# Patient Record
Sex: MALE | Race: WHITE | NOT HISPANIC OR LATINO | Employment: OTHER
[De-identification: names, ages, dates, MRNs, and addresses within clinical notes are randomized per-mention and may not be internally consistent; named-entity substitution may affect disease eponyms.]

---

## 2017-01-10 ENCOUNTER — RECORDS - HEALTHEAST (OUTPATIENT)
Dept: ADMINISTRATIVE | Facility: OTHER | Age: 57
End: 2017-01-10

## 2017-01-19 ENCOUNTER — AMBULATORY - HEALTHEAST (OUTPATIENT)
Dept: PODIATRY | Facility: CLINIC | Age: 57
End: 2017-01-19

## 2017-01-19 DIAGNOSIS — B35.1 NAIL FUNGUS: ICD-10-CM

## 2017-01-19 DIAGNOSIS — L60.2 ONYCHAUXIS: ICD-10-CM

## 2017-01-27 ENCOUNTER — OFFICE VISIT - HEALTHEAST (OUTPATIENT)
Dept: FAMILY MEDICINE | Facility: CLINIC | Age: 57
End: 2017-01-27

## 2017-01-27 DIAGNOSIS — L21.9 SEBORRHEIC DERMATITIS OF SCALP: ICD-10-CM

## 2017-01-27 DIAGNOSIS — H61.20 CERUMEN IMPACTION: ICD-10-CM

## 2017-01-27 DIAGNOSIS — L30.9 ECZEMA: ICD-10-CM

## 2017-01-27 ASSESSMENT — MIFFLIN-ST. JEOR: SCORE: 2046.43

## 2017-02-15 ENCOUNTER — COMMUNICATION - HEALTHEAST (OUTPATIENT)
Dept: FAMILY MEDICINE | Facility: CLINIC | Age: 57
End: 2017-02-15

## 2017-02-21 ENCOUNTER — COMMUNICATION - HEALTHEAST (OUTPATIENT)
Dept: FAMILY MEDICINE | Facility: CLINIC | Age: 57
End: 2017-02-21

## 2017-02-28 ENCOUNTER — OFFICE VISIT - HEALTHEAST (OUTPATIENT)
Dept: FAMILY MEDICINE | Facility: CLINIC | Age: 57
End: 2017-02-28

## 2017-02-28 DIAGNOSIS — L21.9 SEBORRHEIC DERMATITIS: ICD-10-CM

## 2017-02-28 DIAGNOSIS — H61.23 BILATERAL IMPACTED CERUMEN: ICD-10-CM

## 2017-02-28 DIAGNOSIS — M25.561 RIGHT KNEE PAIN: ICD-10-CM

## 2017-02-28 DIAGNOSIS — E53.8 OTHER B-COMPLEX DEFICIENCIES: ICD-10-CM

## 2017-02-28 DIAGNOSIS — Q90.9 DOWN'S SYNDROME: ICD-10-CM

## 2017-03-22 ENCOUNTER — AMBULATORY - HEALTHEAST (OUTPATIENT)
Dept: NURSING | Facility: CLINIC | Age: 57
End: 2017-03-22

## 2017-03-30 ENCOUNTER — AMBULATORY - HEALTHEAST (OUTPATIENT)
Dept: PODIATRY | Facility: CLINIC | Age: 57
End: 2017-03-30

## 2017-03-30 DIAGNOSIS — L60.2 ONYCHAUXIS: ICD-10-CM

## 2017-03-30 DIAGNOSIS — B35.1 NAIL FUNGUS: ICD-10-CM

## 2017-04-26 ENCOUNTER — OFFICE VISIT - HEALTHEAST (OUTPATIENT)
Dept: FAMILY MEDICINE | Facility: CLINIC | Age: 57
End: 2017-04-26

## 2017-04-26 DIAGNOSIS — E53.8 OTHER B-COMPLEX DEFICIENCIES: ICD-10-CM

## 2017-04-26 DIAGNOSIS — H61.23 BILATERAL IMPACTED CERUMEN: ICD-10-CM

## 2017-04-26 DIAGNOSIS — Q90.9 DOWN'S SYNDROME: ICD-10-CM

## 2017-04-26 ASSESSMENT — MIFFLIN-ST. JEOR: SCORE: 2024.03

## 2017-05-10 ENCOUNTER — COMMUNICATION - HEALTHEAST (OUTPATIENT)
Dept: SCHEDULING | Facility: CLINIC | Age: 57
End: 2017-05-10

## 2017-05-10 DIAGNOSIS — H91.93 IMPAIRED HEARING, BILATERAL: ICD-10-CM

## 2017-05-30 ENCOUNTER — RECORDS - HEALTHEAST (OUTPATIENT)
Dept: ADMINISTRATIVE | Facility: OTHER | Age: 57
End: 2017-05-30

## 2017-05-30 ENCOUNTER — OFFICE VISIT - HEALTHEAST (OUTPATIENT)
Dept: FAMILY MEDICINE | Facility: CLINIC | Age: 57
End: 2017-05-30

## 2017-05-30 DIAGNOSIS — E53.8 OTHER B-COMPLEX DEFICIENCIES: ICD-10-CM

## 2017-05-30 DIAGNOSIS — M25.561 RIGHT KNEE PAIN: ICD-10-CM

## 2017-05-30 DIAGNOSIS — L21.9 SEBORRHEIC DERMATITIS: ICD-10-CM

## 2017-05-30 DIAGNOSIS — H61.23 BILATERAL IMPACTED CERUMEN: ICD-10-CM

## 2017-05-30 DIAGNOSIS — Q90.9 DOWN'S SYNDROME: ICD-10-CM

## 2017-05-30 ASSESSMENT — MIFFLIN-ST. JEOR: SCORE: 2011.96

## 2017-06-07 ENCOUNTER — COMMUNICATION - HEALTHEAST (OUTPATIENT)
Dept: FAMILY MEDICINE | Facility: CLINIC | Age: 57
End: 2017-06-07

## 2017-06-07 DIAGNOSIS — M25.561 RIGHT KNEE PAIN: ICD-10-CM

## 2017-06-12 ENCOUNTER — RECORDS - HEALTHEAST (OUTPATIENT)
Dept: ADMINISTRATIVE | Facility: OTHER | Age: 57
End: 2017-06-12

## 2017-06-15 ENCOUNTER — RECORDS - HEALTHEAST (OUTPATIENT)
Dept: ADMINISTRATIVE | Facility: OTHER | Age: 57
End: 2017-06-15

## 2017-06-22 ENCOUNTER — AMBULATORY - HEALTHEAST (OUTPATIENT)
Dept: PODIATRY | Facility: CLINIC | Age: 57
End: 2017-06-22

## 2017-06-22 DIAGNOSIS — L60.2 ONYCHAUXIS: ICD-10-CM

## 2017-06-22 DIAGNOSIS — B35.1 NAIL FUNGUS: ICD-10-CM

## 2017-06-26 ENCOUNTER — COMMUNICATION - HEALTHEAST (OUTPATIENT)
Dept: FAMILY MEDICINE | Facility: CLINIC | Age: 57
End: 2017-06-26

## 2017-06-27 ENCOUNTER — AMBULATORY - HEALTHEAST (OUTPATIENT)
Dept: NURSING | Facility: CLINIC | Age: 57
End: 2017-06-27

## 2017-06-27 ENCOUNTER — RECORDS - HEALTHEAST (OUTPATIENT)
Dept: ADMINISTRATIVE | Facility: OTHER | Age: 57
End: 2017-06-27

## 2017-07-26 ENCOUNTER — OFFICE VISIT - HEALTHEAST (OUTPATIENT)
Dept: FAMILY MEDICINE | Facility: CLINIC | Age: 57
End: 2017-07-26

## 2017-07-26 DIAGNOSIS — E53.8 OTHER B-COMPLEX DEFICIENCIES: ICD-10-CM

## 2017-07-26 DIAGNOSIS — Q90.9 DOWN'S SYNDROME: ICD-10-CM

## 2017-07-26 DIAGNOSIS — M17.0 OSTEOARTHRITIS OF KNEES, BILATERAL: ICD-10-CM

## 2017-07-26 DIAGNOSIS — H61.23 BILATERAL IMPACTED CERUMEN: ICD-10-CM

## 2017-07-26 ASSESSMENT — MIFFLIN-ST. JEOR: SCORE: 2006.46

## 2017-08-15 ENCOUNTER — RECORDS - HEALTHEAST (OUTPATIENT)
Dept: ADMINISTRATIVE | Facility: OTHER | Age: 57
End: 2017-08-15

## 2017-08-30 ENCOUNTER — OFFICE VISIT - HEALTHEAST (OUTPATIENT)
Dept: FAMILY MEDICINE | Facility: CLINIC | Age: 57
End: 2017-08-30

## 2017-08-30 DIAGNOSIS — Q90.9 DOWN'S SYNDROME: ICD-10-CM

## 2017-08-30 DIAGNOSIS — Z23 NEED FOR VACCINATION: ICD-10-CM

## 2017-08-30 DIAGNOSIS — E53.8 OTHER B-COMPLEX DEFICIENCIES: ICD-10-CM

## 2017-08-30 DIAGNOSIS — H61.23 BILATERAL IMPACTED CERUMEN: ICD-10-CM

## 2017-08-30 DIAGNOSIS — B35.1 ONYCHOMYCOSIS: ICD-10-CM

## 2017-08-30 DIAGNOSIS — M17.0 OSTEOARTHRITIS OF KNEES, BILATERAL: ICD-10-CM

## 2017-08-30 ASSESSMENT — MIFFLIN-ST. JEOR: SCORE: 2011.56

## 2017-08-31 ENCOUNTER — AMBULATORY - HEALTHEAST (OUTPATIENT)
Dept: PODIATRY | Facility: CLINIC | Age: 57
End: 2017-08-31

## 2017-08-31 DIAGNOSIS — L60.2 ONYCHAUXIS: ICD-10-CM

## 2017-08-31 DIAGNOSIS — B35.1 NAIL FUNGUS: ICD-10-CM

## 2017-09-05 ENCOUNTER — COMMUNICATION - HEALTHEAST (OUTPATIENT)
Dept: FAMILY MEDICINE | Facility: CLINIC | Age: 57
End: 2017-09-05

## 2017-09-20 ENCOUNTER — RECORDS - HEALTHEAST (OUTPATIENT)
Dept: ADMINISTRATIVE | Facility: OTHER | Age: 57
End: 2017-09-20

## 2017-09-27 ENCOUNTER — OFFICE VISIT - HEALTHEAST (OUTPATIENT)
Dept: FAMILY MEDICINE | Facility: CLINIC | Age: 57
End: 2017-09-27

## 2017-09-27 ENCOUNTER — RECORDS - HEALTHEAST (OUTPATIENT)
Dept: ADMINISTRATIVE | Facility: OTHER | Age: 57
End: 2017-09-27

## 2017-09-27 DIAGNOSIS — H61.23 BILATERAL IMPACTED CERUMEN: ICD-10-CM

## 2017-09-27 DIAGNOSIS — M17.0 OSTEOARTHRITIS OF KNEES, BILATERAL: ICD-10-CM

## 2017-09-27 DIAGNOSIS — Q90.9 DOWN'S SYNDROME: ICD-10-CM

## 2017-09-27 DIAGNOSIS — E53.8 OTHER B-COMPLEX DEFICIENCIES: ICD-10-CM

## 2017-09-27 ASSESSMENT — MIFFLIN-ST. JEOR: SCORE: 2012.13

## 2017-10-25 ENCOUNTER — OFFICE VISIT - HEALTHEAST (OUTPATIENT)
Dept: FAMILY MEDICINE | Facility: CLINIC | Age: 57
End: 2017-10-25

## 2017-10-25 DIAGNOSIS — E53.8 VITAMIN B12 DEFICIENCY: ICD-10-CM

## 2017-10-25 DIAGNOSIS — Q90.9 DOWN'S SYNDROME: ICD-10-CM

## 2017-10-25 DIAGNOSIS — H61.23 BILATERAL IMPACTED CERUMEN: ICD-10-CM

## 2017-10-25 DIAGNOSIS — L85.3 XEROSIS CUTIS: ICD-10-CM

## 2017-10-25 ASSESSMENT — MIFFLIN-ST. JEOR: SCORE: 1997.38

## 2017-11-29 ENCOUNTER — OFFICE VISIT - HEALTHEAST (OUTPATIENT)
Dept: FAMILY MEDICINE | Facility: CLINIC | Age: 57
End: 2017-11-29

## 2017-11-29 DIAGNOSIS — Q90.9 DOWN'S SYNDROME: ICD-10-CM

## 2017-11-29 DIAGNOSIS — M17.0 OSTEOARTHRITIS OF KNEES, BILATERAL: ICD-10-CM

## 2017-11-29 DIAGNOSIS — H61.23 BILATERAL IMPACTED CERUMEN: ICD-10-CM

## 2017-11-29 DIAGNOSIS — E53.8 VITAMIN B12 DEFICIENCY: ICD-10-CM

## 2017-11-29 ASSESSMENT — MIFFLIN-ST. JEOR: SCORE: 1996.53

## 2017-11-30 ENCOUNTER — AMBULATORY - HEALTHEAST (OUTPATIENT)
Dept: PODIATRY | Facility: CLINIC | Age: 57
End: 2017-11-30

## 2017-11-30 DIAGNOSIS — B35.1 NAIL FUNGUS: ICD-10-CM

## 2017-11-30 DIAGNOSIS — L60.2 ONYCHAUXIS: ICD-10-CM

## 2017-12-08 ENCOUNTER — COMMUNICATION - HEALTHEAST (OUTPATIENT)
Dept: ADMINISTRATIVE | Facility: CLINIC | Age: 57
End: 2017-12-08

## 2017-12-11 ENCOUNTER — OFFICE VISIT - HEALTHEAST (OUTPATIENT)
Dept: FAMILY MEDICINE | Facility: CLINIC | Age: 57
End: 2017-12-11

## 2017-12-11 DIAGNOSIS — E66.9 OBESITY: ICD-10-CM

## 2017-12-11 DIAGNOSIS — Q90.9 DOWN'S SYNDROME: ICD-10-CM

## 2017-12-11 DIAGNOSIS — E53.8 VITAMIN B12 DEFICIENCY: ICD-10-CM

## 2017-12-11 DIAGNOSIS — B35.1 ONYCHOMYCOSIS: ICD-10-CM

## 2017-12-11 DIAGNOSIS — Z12.5 PROSTATE CANCER SCREENING: ICD-10-CM

## 2017-12-11 DIAGNOSIS — E03.9 HYPOTHYROIDISM: ICD-10-CM

## 2017-12-11 DIAGNOSIS — Z11.1 SCREENING FOR TUBERCULOSIS: ICD-10-CM

## 2017-12-11 DIAGNOSIS — Z00.01 ENCOUNTER FOR GENERAL ADULT MEDICAL EXAMINATION WITH ABNORMAL FINDINGS: ICD-10-CM

## 2017-12-11 DIAGNOSIS — M17.0 OSTEOARTHRITIS OF KNEES, BILATERAL: ICD-10-CM

## 2017-12-11 DIAGNOSIS — L21.9 SEBORRHEIC DERMATITIS: ICD-10-CM

## 2017-12-11 DIAGNOSIS — Z12.11 SCREEN FOR COLON CANCER: ICD-10-CM

## 2017-12-11 DIAGNOSIS — H61.23 BILATERAL IMPACTED CERUMEN: ICD-10-CM

## 2017-12-11 DIAGNOSIS — H91.93 HEARING DIFFICULTY OF BOTH EARS: ICD-10-CM

## 2017-12-11 ASSESSMENT — MIFFLIN-ST. JEOR: SCORE: 1977.26

## 2017-12-12 LAB
CHOLEST SERPL-MCNC: 162 MG/DL
FASTING STATUS PATIENT QL REPORTED: NO
HDLC SERPL-MCNC: 60 MG/DL
LDLC SERPL CALC-MCNC: 91 MG/DL
PSA SERPL-MCNC: 0.1 NG/ML (ref 0–3.5)
TRIGL SERPL-MCNC: 55 MG/DL

## 2017-12-13 ENCOUNTER — COMMUNICATION - HEALTHEAST (OUTPATIENT)
Dept: FAMILY MEDICINE | Facility: CLINIC | Age: 57
End: 2017-12-13

## 2017-12-20 ENCOUNTER — AMBULATORY - HEALTHEAST (OUTPATIENT)
Dept: NURSING | Facility: CLINIC | Age: 57
End: 2017-12-20

## 2018-01-09 ENCOUNTER — OFFICE VISIT - HEALTHEAST (OUTPATIENT)
Dept: FAMILY MEDICINE | Facility: CLINIC | Age: 58
End: 2018-01-09

## 2018-01-09 ENCOUNTER — RECORDS - HEALTHEAST (OUTPATIENT)
Dept: GENERAL RADIOLOGY | Facility: CLINIC | Age: 58
End: 2018-01-09
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
## 2018-01-09 DIAGNOSIS — S83.92XA SPRAIN OF LEFT KNEE: ICD-10-CM

## 2018-01-09 DIAGNOSIS — S00.01XA ABRASION, SCALP W/O INFECTION: ICD-10-CM

## 2018-01-24 ENCOUNTER — OFFICE VISIT - HEALTHEAST (OUTPATIENT)
Dept: FAMILY MEDICINE | Facility: CLINIC | Age: 58
End: 2018-01-24

## 2018-01-24 DIAGNOSIS — H61.23 BILATERAL IMPACTED CERUMEN: ICD-10-CM

## 2018-01-24 DIAGNOSIS — E53.8 VITAMIN B12 DEFICIENCY: ICD-10-CM

## 2018-01-24 DIAGNOSIS — S00.01XA ABRASION OF SCALP: ICD-10-CM

## 2018-01-24 DIAGNOSIS — Q90.9 DOWN'S SYNDROME: ICD-10-CM

## 2018-01-24 DIAGNOSIS — Z12.11 SCREEN FOR COLON CANCER: ICD-10-CM

## 2018-01-24 ASSESSMENT — MIFFLIN-ST. JEOR: SCORE: 1967.62

## 2018-02-24 ENCOUNTER — COMMUNICATION - HEALTHEAST (OUTPATIENT)
Dept: FAMILY MEDICINE | Facility: CLINIC | Age: 58
End: 2018-02-24

## 2018-02-24 DIAGNOSIS — E03.9 HYPOTHYROIDISM: ICD-10-CM

## 2018-02-28 ENCOUNTER — HOSPITAL ENCOUNTER (OUTPATIENT)
Dept: CT IMAGING | Facility: HOSPITAL | Age: 58
Discharge: HOME | End: 2018-02-28
Attending: FAMILY MEDICINE

## 2018-02-28 ENCOUNTER — OFFICE VISIT - HEALTHEAST (OUTPATIENT)
Dept: FAMILY MEDICINE | Facility: CLINIC | Age: 58
End: 2018-02-28

## 2018-02-28 DIAGNOSIS — S09.90XA HEAD INJURY: ICD-10-CM

## 2018-02-28 DIAGNOSIS — H61.23 BILATERAL IMPACTED CERUMEN: ICD-10-CM

## 2018-02-28 DIAGNOSIS — E53.8 VITAMIN B12 DEFICIENCY: ICD-10-CM

## 2018-02-28 DIAGNOSIS — R51.9 HEADACHE: ICD-10-CM

## 2018-02-28 ASSESSMENT — MIFFLIN-ST. JEOR: SCORE: 1973.29

## 2018-03-01 ENCOUNTER — AMBULATORY - HEALTHEAST (OUTPATIENT)
Dept: PODIATRY | Facility: CLINIC | Age: 58
End: 2018-03-01

## 2018-03-01 DIAGNOSIS — B35.1 NAIL FUNGUS: ICD-10-CM

## 2018-03-01 DIAGNOSIS — L60.2 ONYCHAUXIS: ICD-10-CM

## 2018-03-01 DIAGNOSIS — L84 TYLOMA: ICD-10-CM

## 2018-03-05 ENCOUNTER — COMMUNICATION - HEALTHEAST (OUTPATIENT)
Dept: FAMILY MEDICINE | Facility: CLINIC | Age: 58
End: 2018-03-05

## 2018-03-07 ENCOUNTER — OFFICE VISIT - HEALTHEAST (OUTPATIENT)
Dept: FAMILY MEDICINE | Facility: CLINIC | Age: 58
End: 2018-03-07
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
## 2018-03-07 DIAGNOSIS — L85.3 DRY SKIN: ICD-10-CM

## 2018-03-07 DIAGNOSIS — L98.9 SKIN LESION OF BACK: ICD-10-CM

## 2018-03-07 ASSESSMENT — MIFFLIN-ST. JEOR: SCORE: 1983.22

## 2018-03-28 ENCOUNTER — OFFICE VISIT - HEALTHEAST (OUTPATIENT)
Dept: FAMILY MEDICINE | Facility: CLINIC | Age: 58
End: 2018-03-28

## 2018-03-28 DIAGNOSIS — E53.8 VITAMIN B12 DEFICIENCY: ICD-10-CM

## 2018-03-28 DIAGNOSIS — H61.23 BILATERAL IMPACTED CERUMEN: ICD-10-CM

## 2018-03-28 DIAGNOSIS — Z12.11 SCREEN FOR COLON CANCER: ICD-10-CM

## 2018-03-28 ASSESSMENT — MIFFLIN-ST. JEOR: SCORE: 1981.23

## 2018-04-04 ENCOUNTER — RECORDS - HEALTHEAST (OUTPATIENT)
Dept: ADMINISTRATIVE | Facility: OTHER | Age: 58
End: 2018-04-04

## 2018-04-25 ENCOUNTER — OFFICE VISIT - HEALTHEAST (OUTPATIENT)
Dept: FAMILY MEDICINE | Facility: CLINIC | Age: 58
End: 2018-04-25

## 2018-04-25 DIAGNOSIS — H61.23 BILATERAL IMPACTED CERUMEN: ICD-10-CM

## 2018-04-25 DIAGNOSIS — E53.8 VITAMIN B12 DEFICIENCY: ICD-10-CM

## 2018-04-25 ASSESSMENT — MIFFLIN-ST. JEOR: SCORE: 1969.61

## 2018-05-04 ENCOUNTER — COMMUNICATION - HEALTHEAST (OUTPATIENT)
Dept: ADMINISTRATIVE | Facility: CLINIC | Age: 58
End: 2018-05-04

## 2018-05-04 ENCOUNTER — AMBULATORY - HEALTHEAST (OUTPATIENT)
Dept: LAB | Facility: CLINIC | Age: 58
End: 2018-05-04

## 2018-05-04 DIAGNOSIS — Z12.11 COLON CANCER SCREENING: ICD-10-CM

## 2018-05-04 LAB
HEMOCCULT SP1 STL QL: NEGATIVE
HEMOCCULT SP2 STL QL: NEGATIVE
HEMOCCULT SP3 STL QL: NEGATIVE

## 2018-05-09 ENCOUNTER — COMMUNICATION - HEALTHEAST (OUTPATIENT)
Dept: FAMILY MEDICINE | Facility: CLINIC | Age: 58
End: 2018-05-09

## 2018-05-23 ENCOUNTER — OFFICE VISIT - HEALTHEAST (OUTPATIENT)
Dept: FAMILY MEDICINE | Facility: CLINIC | Age: 58
End: 2018-05-23

## 2018-05-23 DIAGNOSIS — H61.23 BILATERAL IMPACTED CERUMEN: ICD-10-CM

## 2018-05-23 DIAGNOSIS — Q90.9 DOWN'S SYNDROME: ICD-10-CM

## 2018-05-23 DIAGNOSIS — E53.8 VITAMIN B12 DEFICIENCY: ICD-10-CM

## 2018-05-23 ASSESSMENT — MIFFLIN-ST. JEOR: SCORE: 1976.69

## 2018-06-11 ENCOUNTER — OFFICE VISIT - HEALTHEAST (OUTPATIENT)
Dept: PODIATRY | Facility: CLINIC | Age: 58
End: 2018-06-11

## 2018-06-11 DIAGNOSIS — L60.2 ONYCHAUXIS: ICD-10-CM

## 2018-06-11 DIAGNOSIS — B35.1 NAIL FUNGUS: ICD-10-CM

## 2018-06-11 DIAGNOSIS — L84 TYLOMA: ICD-10-CM

## 2018-06-27 ENCOUNTER — OFFICE VISIT - HEALTHEAST (OUTPATIENT)
Dept: FAMILY MEDICINE | Facility: CLINIC | Age: 58
End: 2018-06-27

## 2018-06-27 DIAGNOSIS — H61.23 BILATERAL IMPACTED CERUMEN: ICD-10-CM

## 2018-06-27 DIAGNOSIS — M25.532 LEFT WRIST PAIN: ICD-10-CM

## 2018-06-27 DIAGNOSIS — E53.8 VITAMIN B12 DEFICIENCY: ICD-10-CM

## 2018-06-27 DIAGNOSIS — Q90.9 DOWN'S SYNDROME: ICD-10-CM

## 2018-06-27 DIAGNOSIS — H18.9 CORNEAL DISORDER: ICD-10-CM

## 2018-06-27 ASSESSMENT — MIFFLIN-ST. JEOR: SCORE: 1944.38

## 2018-07-17 ENCOUNTER — RECORDS - HEALTHEAST (OUTPATIENT)
Dept: ADMINISTRATIVE | Facility: OTHER | Age: 58
End: 2018-07-17

## 2018-07-24 ENCOUNTER — OFFICE VISIT - HEALTHEAST (OUTPATIENT)
Dept: FAMILY MEDICINE | Facility: CLINIC | Age: 58
End: 2018-07-24

## 2018-07-24 DIAGNOSIS — H91.93 HEARING DIFFICULTY OF BOTH EARS: ICD-10-CM

## 2018-07-24 DIAGNOSIS — Q90.9 DOWN'S SYNDROME: ICD-10-CM

## 2018-07-24 DIAGNOSIS — H61.23 BILATERAL IMPACTED CERUMEN: ICD-10-CM

## 2018-07-24 DIAGNOSIS — E53.8 VITAMIN B12 DEFICIENCY: ICD-10-CM

## 2018-07-24 ASSESSMENT — MIFFLIN-ST. JEOR: SCORE: 1955.15

## 2018-08-29 ENCOUNTER — OFFICE VISIT - HEALTHEAST (OUTPATIENT)
Dept: FAMILY MEDICINE | Facility: CLINIC | Age: 58
End: 2018-08-29

## 2018-08-29 DIAGNOSIS — Q90.9 DOWN'S SYNDROME: ICD-10-CM

## 2018-08-29 DIAGNOSIS — H91.93 HEARING DIFFICULTY OF BOTH EARS: ICD-10-CM

## 2018-08-29 DIAGNOSIS — E53.8 VITAMIN B12 DEFICIENCY: ICD-10-CM

## 2018-08-29 DIAGNOSIS — H61.23 BILATERAL IMPACTED CERUMEN: ICD-10-CM

## 2018-08-29 ASSESSMENT — MIFFLIN-ST. JEOR: SCORE: 1969.04

## 2018-09-12 ENCOUNTER — COMMUNICATION - HEALTHEAST (OUTPATIENT)
Dept: FAMILY MEDICINE | Facility: CLINIC | Age: 58
End: 2018-09-12

## 2018-09-26 ENCOUNTER — OFFICE VISIT - HEALTHEAST (OUTPATIENT)
Dept: FAMILY MEDICINE | Facility: CLINIC | Age: 58
End: 2018-09-26

## 2018-09-26 DIAGNOSIS — E53.8 VITAMIN B12 DEFICIENCY: ICD-10-CM

## 2018-09-26 DIAGNOSIS — Q90.9 DOWN'S SYNDROME: ICD-10-CM

## 2018-09-26 DIAGNOSIS — H61.23 BILATERAL IMPACTED CERUMEN: ICD-10-CM

## 2018-09-26 DIAGNOSIS — H91.93 HEARING DIFFICULTY OF BOTH EARS: ICD-10-CM

## 2018-09-26 ASSESSMENT — MIFFLIN-ST. JEOR: SCORE: 1953.45

## 2018-10-04 ENCOUNTER — OFFICE VISIT - HEALTHEAST (OUTPATIENT)
Dept: PODIATRY | Facility: CLINIC | Age: 58
End: 2018-10-04

## 2018-10-04 DIAGNOSIS — L60.2 ONYCHAUXIS: ICD-10-CM

## 2018-10-04 DIAGNOSIS — B35.1 NAIL FUNGUS: ICD-10-CM

## 2018-10-04 DIAGNOSIS — L84 TYLOMA: ICD-10-CM

## 2018-10-04 ASSESSMENT — MIFFLIN-ST. JEOR: SCORE: 1953.45

## 2018-10-10 ENCOUNTER — RECORDS - HEALTHEAST (OUTPATIENT)
Dept: ADMINISTRATIVE | Facility: OTHER | Age: 58
End: 2018-10-10

## 2018-10-16 ENCOUNTER — RECORDS - HEALTHEAST (OUTPATIENT)
Dept: ADMINISTRATIVE | Facility: OTHER | Age: 58
End: 2018-10-16

## 2018-10-24 ENCOUNTER — OFFICE VISIT - HEALTHEAST (OUTPATIENT)
Dept: FAMILY MEDICINE | Facility: CLINIC | Age: 58
End: 2018-10-24

## 2018-10-24 DIAGNOSIS — E53.8 VITAMIN B12 DEFICIENCY: ICD-10-CM

## 2018-10-24 DIAGNOSIS — Q90.9 DOWN'S SYNDROME: ICD-10-CM

## 2018-10-24 DIAGNOSIS — H61.23 BILATERAL IMPACTED CERUMEN: ICD-10-CM

## 2018-10-24 ASSESSMENT — MIFFLIN-ST. JEOR: SCORE: 1944.66

## 2018-11-12 ENCOUNTER — RECORDS - HEALTHEAST (OUTPATIENT)
Dept: ADMINISTRATIVE | Facility: OTHER | Age: 58
End: 2018-11-12

## 2018-11-20 ENCOUNTER — COMMUNICATION - HEALTHEAST (OUTPATIENT)
Dept: FAMILY MEDICINE | Facility: CLINIC | Age: 58
End: 2018-11-20

## 2018-11-20 DIAGNOSIS — E03.9 HYPOTHYROIDISM: ICD-10-CM

## 2018-11-21 ENCOUNTER — COMMUNICATION - HEALTHEAST (OUTPATIENT)
Dept: FAMILY MEDICINE | Facility: CLINIC | Age: 58
End: 2018-11-21

## 2018-11-26 ENCOUNTER — OFFICE VISIT - HEALTHEAST (OUTPATIENT)
Dept: FAMILY MEDICINE | Facility: CLINIC | Age: 58
End: 2018-11-26

## 2018-11-26 DIAGNOSIS — Q90.9 DOWN'S SYNDROME: ICD-10-CM

## 2018-11-26 DIAGNOSIS — H61.23 BILATERAL IMPACTED CERUMEN: ICD-10-CM

## 2018-11-26 DIAGNOSIS — E53.8 VITAMIN B12 DEFICIENCY: ICD-10-CM

## 2018-11-26 DIAGNOSIS — H91.93 HEARING DIFFICULTY OF BOTH EARS: ICD-10-CM

## 2018-11-26 ASSESSMENT — MIFFLIN-ST. JEOR: SCORE: 1948.91

## 2018-12-19 ENCOUNTER — OFFICE VISIT - HEALTHEAST (OUTPATIENT)
Dept: FAMILY MEDICINE | Facility: CLINIC | Age: 58
End: 2018-12-19

## 2018-12-19 DIAGNOSIS — G47.33 OBSTRUCTIVE SLEEP APNEA SYNDROME: ICD-10-CM

## 2018-12-19 DIAGNOSIS — Q90.9 DOWN'S SYNDROME: ICD-10-CM

## 2018-12-19 DIAGNOSIS — E66.01 CLASS 3 SEVERE OBESITY DUE TO EXCESS CALORIES WITH BODY MASS INDEX (BMI) OF 40.0 TO 44.9 IN ADULT, UNSPECIFIED WHETHER SERIOUS COMORBIDITY PRESENT (H): ICD-10-CM

## 2018-12-19 DIAGNOSIS — R73.03 PREDIABETES: ICD-10-CM

## 2018-12-19 DIAGNOSIS — E53.8 VITAMIN B12 DEFICIENCY: ICD-10-CM

## 2018-12-19 DIAGNOSIS — F71 MODERATE INTELLECTUAL DISABILITIES: ICD-10-CM

## 2018-12-19 DIAGNOSIS — E03.9 HYPOTHYROIDISM, UNSPECIFIED TYPE: ICD-10-CM

## 2018-12-19 DIAGNOSIS — H91.93 HEARING DIFFICULTY OF BOTH EARS: ICD-10-CM

## 2018-12-19 DIAGNOSIS — L85.3 DRY SKIN: ICD-10-CM

## 2018-12-19 DIAGNOSIS — M10.9 GOUTY ARTHROPATHY: ICD-10-CM

## 2018-12-19 DIAGNOSIS — H61.23 BILATERAL IMPACTED CERUMEN: ICD-10-CM

## 2018-12-19 DIAGNOSIS — Z00.01 ENCOUNTER FOR GENERAL ADULT MEDICAL EXAMINATION WITH ABNORMAL FINDINGS: ICD-10-CM

## 2018-12-19 DIAGNOSIS — Z12.5 SCREENING FOR PROSTATE CANCER: ICD-10-CM

## 2018-12-19 DIAGNOSIS — E66.813 CLASS 3 SEVERE OBESITY DUE TO EXCESS CALORIES WITH BODY MASS INDEX (BMI) OF 40.0 TO 44.9 IN ADULT, UNSPECIFIED WHETHER SERIOUS COMORBIDITY PRESENT (H): ICD-10-CM

## 2018-12-19 DIAGNOSIS — H18.9 CORNEAL DISORDER: ICD-10-CM

## 2018-12-19 LAB
ALBUMIN SERPL-MCNC: 3.5 G/DL (ref 3.5–5)
ALP SERPL-CCNC: 91 U/L (ref 45–120)
ALT SERPL W P-5'-P-CCNC: 21 U/L (ref 0–45)
ANION GAP SERPL CALCULATED.3IONS-SCNC: 11 MMOL/L (ref 5–18)
AST SERPL W P-5'-P-CCNC: 25 U/L (ref 0–40)
BILIRUB SERPL-MCNC: 0.6 MG/DL (ref 0–1)
BUN SERPL-MCNC: 15 MG/DL (ref 8–22)
CALCIUM SERPL-MCNC: 9.3 MG/DL (ref 8.5–10.5)
CHLORIDE BLD-SCNC: 102 MMOL/L (ref 98–107)
CO2 SERPL-SCNC: 27 MMOL/L (ref 22–31)
CREAT SERPL-MCNC: 0.8 MG/DL (ref 0.7–1.3)
ERYTHROCYTE [DISTWIDTH] IN BLOOD BY AUTOMATED COUNT: 11.3 % (ref 11–14.5)
GFR SERPL CREATININE-BSD FRML MDRD: >60 ML/MIN/1.73M2
GLUCOSE BLD-MCNC: 98 MG/DL (ref 70–125)
HBA1C MFR BLD: 5.6 % (ref 3.5–6)
HCT VFR BLD AUTO: 48.2 % (ref 40–54)
HGB BLD-MCNC: 16.3 G/DL (ref 14–18)
MCH RBC QN AUTO: 34.5 PG (ref 27–34)
MCHC RBC AUTO-ENTMCNC: 33.7 G/DL (ref 32–36)
MCV RBC AUTO: 102 FL (ref 80–100)
PLATELET # BLD AUTO: 185 THOU/UL (ref 140–440)
PMV BLD AUTO: 8.1 FL (ref 7–10)
POTASSIUM BLD-SCNC: 5 MMOL/L (ref 3.5–5)
PROT SERPL-MCNC: 7.7 G/DL (ref 6–8)
PSA SERPL-MCNC: 0.4 NG/ML (ref 0–3.5)
RBC # BLD AUTO: 4.72 MILL/UL (ref 4.4–6.2)
SODIUM SERPL-SCNC: 140 MMOL/L (ref 136–145)
TSH SERPL DL<=0.005 MIU/L-ACNC: 1.2 UIU/ML (ref 0.3–5)
URATE SERPL-MCNC: 6.5 MG/DL (ref 3–8)
VIT B12 SERPL-MCNC: >2000 PG/ML (ref 213–816)
WBC: 5.6 THOU/UL (ref 4–11)

## 2018-12-19 ASSESSMENT — MIFFLIN-ST. JEOR: SCORE: 1910.36

## 2018-12-25 ENCOUNTER — COMMUNICATION - HEALTHEAST (OUTPATIENT)
Dept: FAMILY MEDICINE | Facility: CLINIC | Age: 58
End: 2018-12-25

## 2019-01-22 ENCOUNTER — OFFICE VISIT - HEALTHEAST (OUTPATIENT)
Dept: FAMILY MEDICINE | Facility: CLINIC | Age: 59
End: 2019-01-22

## 2019-01-22 DIAGNOSIS — Q90.9 DOWN'S SYNDROME: ICD-10-CM

## 2019-01-22 DIAGNOSIS — H61.23 BILATERAL IMPACTED CERUMEN: ICD-10-CM

## 2019-01-22 DIAGNOSIS — E53.8 VITAMIN B12 DEFICIENCY: ICD-10-CM

## 2019-01-22 ASSESSMENT — MIFFLIN-ST. JEOR: SCORE: 1925.66

## 2019-02-19 ENCOUNTER — COMMUNICATION - HEALTHEAST (OUTPATIENT)
Dept: FAMILY MEDICINE | Facility: CLINIC | Age: 59
End: 2019-02-19

## 2019-02-19 DIAGNOSIS — E03.9 HYPOTHYROIDISM: ICD-10-CM

## 2019-02-28 ENCOUNTER — OFFICE VISIT - HEALTHEAST (OUTPATIENT)
Dept: FAMILY MEDICINE | Facility: CLINIC | Age: 59
End: 2019-02-28

## 2019-02-28 DIAGNOSIS — H61.23 BILATERAL IMPACTED CERUMEN: ICD-10-CM

## 2019-02-28 DIAGNOSIS — E66.01 CLASS 3 SEVERE OBESITY DUE TO EXCESS CALORIES WITH BODY MASS INDEX (BMI) OF 40.0 TO 44.9 IN ADULT, UNSPECIFIED WHETHER SERIOUS COMORBIDITY PRESENT (H): ICD-10-CM

## 2019-02-28 DIAGNOSIS — E53.8 VITAMIN B12 DEFICIENCY: ICD-10-CM

## 2019-02-28 DIAGNOSIS — E66.813 CLASS 3 SEVERE OBESITY DUE TO EXCESS CALORIES WITH BODY MASS INDEX (BMI) OF 40.0 TO 44.9 IN ADULT, UNSPECIFIED WHETHER SERIOUS COMORBIDITY PRESENT (H): ICD-10-CM

## 2019-02-28 ASSESSMENT — MIFFLIN-ST. JEOR: SCORE: 1925.09

## 2019-03-27 ENCOUNTER — OFFICE VISIT - HEALTHEAST (OUTPATIENT)
Dept: FAMILY MEDICINE | Facility: CLINIC | Age: 59
End: 2019-03-27

## 2019-03-27 DIAGNOSIS — H61.23 BILATERAL IMPACTED CERUMEN: ICD-10-CM

## 2019-03-27 DIAGNOSIS — E53.8 VITAMIN B12 DEFICIENCY: ICD-10-CM

## 2019-03-27 DIAGNOSIS — Q90.9 DOWN'S SYNDROME: ICD-10-CM

## 2019-03-27 ASSESSMENT — MIFFLIN-ST. JEOR: SCORE: 1925.09

## 2019-04-23 ENCOUNTER — OFFICE VISIT - HEALTHEAST (OUTPATIENT)
Dept: FAMILY MEDICINE | Facility: CLINIC | Age: 59
End: 2019-04-23

## 2019-04-23 DIAGNOSIS — H61.23 BILATERAL IMPACTED CERUMEN: ICD-10-CM

## 2019-04-23 DIAGNOSIS — Q90.9 DOWN'S SYNDROME: ICD-10-CM

## 2019-04-23 DIAGNOSIS — E53.8 VITAMIN B12 DEFICIENCY: ICD-10-CM

## 2019-04-23 DIAGNOSIS — Z12.11 SCREEN FOR COLON CANCER: ICD-10-CM

## 2019-04-23 ASSESSMENT — MIFFLIN-ST. JEOR: SCORE: 1921.4

## 2019-05-22 ENCOUNTER — RECORDS - HEALTHEAST (OUTPATIENT)
Dept: ADMINISTRATIVE | Facility: OTHER | Age: 59
End: 2019-05-22

## 2019-05-24 ENCOUNTER — AMBULATORY - HEALTHEAST (OUTPATIENT)
Dept: LAB | Facility: CLINIC | Age: 59
End: 2019-05-24

## 2019-05-24 DIAGNOSIS — Z12.11 COLON CANCER SCREENING: ICD-10-CM

## 2019-05-24 LAB
HEMOCCULT SP1 STL QL: NEGATIVE
HEMOCCULT SP2 STL QL: NEGATIVE

## 2019-05-29 ENCOUNTER — OFFICE VISIT - HEALTHEAST (OUTPATIENT)
Dept: FAMILY MEDICINE | Facility: CLINIC | Age: 59
End: 2019-05-29

## 2019-05-29 DIAGNOSIS — E53.8 VITAMIN B12 DEFICIENCY: ICD-10-CM

## 2019-05-29 DIAGNOSIS — Q90.9 DOWN'S SYNDROME: ICD-10-CM

## 2019-05-29 DIAGNOSIS — H61.23 BILATERAL IMPACTED CERUMEN: ICD-10-CM

## 2019-05-29 ASSESSMENT — MIFFLIN-ST. JEOR: SCORE: 1928.72

## 2019-06-03 ENCOUNTER — RECORDS - HEALTHEAST (OUTPATIENT)
Dept: ADMINISTRATIVE | Facility: OTHER | Age: 59
End: 2019-06-03

## 2019-06-26 ENCOUNTER — OFFICE VISIT - HEALTHEAST (OUTPATIENT)
Dept: FAMILY MEDICINE | Facility: CLINIC | Age: 59
End: 2019-06-26

## 2019-06-26 DIAGNOSIS — E53.8 VITAMIN B12 DEFICIENCY: ICD-10-CM

## 2019-06-26 DIAGNOSIS — H61.23 BILATERAL IMPACTED CERUMEN: ICD-10-CM

## 2019-06-26 DIAGNOSIS — R15.9 INCONTINENCE OF FECES, UNSPECIFIED FECAL INCONTINENCE TYPE: ICD-10-CM

## 2019-06-26 DIAGNOSIS — R32 URINARY INCONTINENCE, UNSPECIFIED TYPE: ICD-10-CM

## 2019-06-26 DIAGNOSIS — Q90.9 DOWN'S SYNDROME: ICD-10-CM

## 2019-06-26 ASSESSMENT — MIFFLIN-ST. JEOR: SCORE: 1926.9

## 2019-07-24 ENCOUNTER — RECORDS - HEALTHEAST (OUTPATIENT)
Dept: ADMINISTRATIVE | Facility: OTHER | Age: 59
End: 2019-07-24

## 2019-07-24 ENCOUNTER — OFFICE VISIT - HEALTHEAST (OUTPATIENT)
Dept: FAMILY MEDICINE | Facility: CLINIC | Age: 59
End: 2019-07-24

## 2019-07-24 DIAGNOSIS — E53.8 VITAMIN B12 DEFICIENCY: ICD-10-CM

## 2019-07-24 DIAGNOSIS — R32 URINARY INCONTINENCE, UNSPECIFIED TYPE: ICD-10-CM

## 2019-07-24 DIAGNOSIS — Q90.9 DOWN'S SYNDROME: ICD-10-CM

## 2019-07-24 DIAGNOSIS — R15.9 INCONTINENCE OF FECES, UNSPECIFIED FECAL INCONTINENCE TYPE: ICD-10-CM

## 2019-07-24 DIAGNOSIS — H61.23 BILATERAL IMPACTED CERUMEN: ICD-10-CM

## 2019-07-24 ASSESSMENT — MIFFLIN-ST. JEOR: SCORE: 1929.63

## 2019-08-28 ENCOUNTER — OFFICE VISIT - HEALTHEAST (OUTPATIENT)
Dept: FAMILY MEDICINE | Facility: CLINIC | Age: 59
End: 2019-08-28

## 2019-08-28 DIAGNOSIS — E53.8 VITAMIN B12 DEFICIENCY: ICD-10-CM

## 2019-08-28 DIAGNOSIS — Q90.9 DOWN'S SYNDROME: ICD-10-CM

## 2019-08-28 DIAGNOSIS — H61.23 BILATERAL IMPACTED CERUMEN: ICD-10-CM

## 2019-08-28 ASSESSMENT — MIFFLIN-ST. JEOR: SCORE: 1920.55

## 2019-09-25 ENCOUNTER — OFFICE VISIT - HEALTHEAST (OUTPATIENT)
Dept: FAMILY MEDICINE | Facility: CLINIC | Age: 59
End: 2019-09-25

## 2019-09-25 DIAGNOSIS — Q90.9 DOWN'S SYNDROME: ICD-10-CM

## 2019-09-25 DIAGNOSIS — Z23 NEED FOR IMMUNIZATION AGAINST INFLUENZA: ICD-10-CM

## 2019-09-25 DIAGNOSIS — L21.9 SEBORRHEIC DERMATITIS: ICD-10-CM

## 2019-09-25 DIAGNOSIS — E53.8 VITAMIN B12 DEFICIENCY: ICD-10-CM

## 2019-09-25 DIAGNOSIS — H61.23 BILATERAL IMPACTED CERUMEN: ICD-10-CM

## 2019-09-25 DIAGNOSIS — H91.93 HEARING DIFFICULTY OF BOTH EARS: ICD-10-CM

## 2019-09-25 ASSESSMENT — MIFFLIN-ST. JEOR: SCORE: 1934.62

## 2019-10-08 ENCOUNTER — DOCUMENTATION ONLY (OUTPATIENT)
Dept: OTHER | Facility: CLINIC | Age: 59
End: 2019-10-08

## 2019-10-08 ENCOUNTER — AMBULATORY - HEALTHEAST (OUTPATIENT)
Dept: OTHER | Facility: CLINIC | Age: 59
End: 2019-10-08

## 2019-10-23 ENCOUNTER — OFFICE VISIT - HEALTHEAST (OUTPATIENT)
Dept: FAMILY MEDICINE | Facility: CLINIC | Age: 59
End: 2019-10-23

## 2019-10-23 DIAGNOSIS — H61.23 BILATERAL IMPACTED CERUMEN: ICD-10-CM

## 2019-10-23 DIAGNOSIS — Q90.9 DOWN'S SYNDROME: ICD-10-CM

## 2019-10-23 DIAGNOSIS — S00.412A ABRASION OF LEFT EAR, INITIAL ENCOUNTER: ICD-10-CM

## 2019-10-23 DIAGNOSIS — E53.8 VITAMIN B12 DEFICIENCY: ICD-10-CM

## 2019-10-23 ASSESSMENT — MIFFLIN-ST. JEOR: SCORE: 1925.78

## 2019-10-25 ENCOUNTER — COMMUNICATION - HEALTHEAST (OUTPATIENT)
Dept: SCHEDULING | Facility: CLINIC | Age: 59
End: 2019-10-25

## 2019-11-19 ENCOUNTER — OFFICE VISIT - HEALTHEAST (OUTPATIENT)
Dept: FAMILY MEDICINE | Facility: CLINIC | Age: 59
End: 2019-11-19

## 2019-11-19 DIAGNOSIS — E53.8 VITAMIN B12 DEFICIENCY: ICD-10-CM

## 2019-11-19 DIAGNOSIS — Q90.9 DOWN'S SYNDROME: ICD-10-CM

## 2019-11-19 DIAGNOSIS — H61.23 BILATERAL IMPACTED CERUMEN: ICD-10-CM

## 2019-11-19 DIAGNOSIS — Z66 DNR (DO NOT RESUSCITATE): ICD-10-CM

## 2019-11-19 ASSESSMENT — MIFFLIN-ST. JEOR: SCORE: 1911.6

## 2019-11-25 ENCOUNTER — RECORDS - HEALTHEAST (OUTPATIENT)
Dept: ADMINISTRATIVE | Facility: OTHER | Age: 59
End: 2019-11-25

## 2019-12-09 ENCOUNTER — COMMUNICATION - HEALTHEAST (OUTPATIENT)
Dept: FAMILY MEDICINE | Facility: CLINIC | Age: 59
End: 2019-12-09

## 2019-12-09 DIAGNOSIS — H18.9 CORNEAL DISORDER: ICD-10-CM

## 2019-12-20 ENCOUNTER — COMMUNICATION - HEALTHEAST (OUTPATIENT)
Dept: FAMILY MEDICINE | Facility: CLINIC | Age: 59
End: 2019-12-20

## 2019-12-20 DIAGNOSIS — M10.9 GOUTY ARTHROPATHY: ICD-10-CM

## 2019-12-30 ENCOUNTER — OFFICE VISIT - HEALTHEAST (OUTPATIENT)
Dept: FAMILY MEDICINE | Facility: CLINIC | Age: 59
End: 2019-12-30

## 2019-12-30 DIAGNOSIS — R73.09 OTHER ABNORMAL GLUCOSE: ICD-10-CM

## 2019-12-30 DIAGNOSIS — Z13.220 SCREENING CHOLESTEROL LEVEL: ICD-10-CM

## 2019-12-30 DIAGNOSIS — E03.9 HYPOTHYROIDISM, UNSPECIFIED TYPE: ICD-10-CM

## 2019-12-30 DIAGNOSIS — F03.90 DEMENTIA WITHOUT BEHAVIORAL DISTURBANCE, UNSPECIFIED DEMENTIA TYPE: ICD-10-CM

## 2019-12-30 DIAGNOSIS — E66.01 MORBID OBESITY (H): ICD-10-CM

## 2019-12-30 DIAGNOSIS — Z12.5 SCREENING FOR PROSTATE CANCER: ICD-10-CM

## 2019-12-30 DIAGNOSIS — E53.8 VITAMIN B12 DEFICIENCY: ICD-10-CM

## 2019-12-30 DIAGNOSIS — H61.23 BILATERAL IMPACTED CERUMEN: ICD-10-CM

## 2019-12-30 DIAGNOSIS — Z00.01 ENCOUNTER FOR GENERAL ADULT MEDICAL EXAMINATION WITH ABNORMAL FINDINGS: ICD-10-CM

## 2019-12-30 DIAGNOSIS — J02.0 STREP THROAT: ICD-10-CM

## 2019-12-30 DIAGNOSIS — Q90.9 DOWN'S SYNDROME: ICD-10-CM

## 2019-12-30 DIAGNOSIS — M17.0 PRIMARY OSTEOARTHRITIS OF BOTH KNEES: ICD-10-CM

## 2019-12-30 DIAGNOSIS — M10.9 GOUTY ARTHROPATHY: ICD-10-CM

## 2019-12-30 DIAGNOSIS — G47.33 OBSTRUCTIVE SLEEP APNEA SYNDROME: ICD-10-CM

## 2019-12-30 LAB
DEPRECATED S PYO AG THROAT QL EIA: ABNORMAL
ERYTHROCYTE [DISTWIDTH] IN BLOOD BY AUTOMATED COUNT: 13.7 % (ref 11–14.5)
HBA1C MFR BLD: 5.5 % (ref 3.5–6)
HCT VFR BLD AUTO: 51.6 % (ref 40–54)
HGB BLD-MCNC: 17.4 G/DL (ref 14–18)
MCH RBC QN AUTO: 33.3 PG (ref 27–34)
MCHC RBC AUTO-ENTMCNC: 33.7 G/DL (ref 32–36)
MCV RBC AUTO: 99 FL (ref 80–100)
PLATELET # BLD AUTO: 212 THOU/UL (ref 140–440)
PMV BLD AUTO: 10 FL (ref 8.5–12.5)
RBC # BLD AUTO: 5.23 MILL/UL (ref 4.4–6.2)
WBC: 5.2 THOU/UL (ref 4–11)

## 2019-12-30 ASSESSMENT — MIFFLIN-ST. JEOR: SCORE: 1900.72

## 2019-12-31 LAB
ANION GAP SERPL CALCULATED.3IONS-SCNC: 11 MMOL/L (ref 5–18)
BUN SERPL-MCNC: 16 MG/DL (ref 8–22)
CALCIUM SERPL-MCNC: 9.1 MG/DL (ref 8.5–10.5)
CHLORIDE BLD-SCNC: 103 MMOL/L (ref 98–107)
CHOLEST SERPL-MCNC: 177 MG/DL
CO2 SERPL-SCNC: 27 MMOL/L (ref 22–31)
CREAT SERPL-MCNC: 0.88 MG/DL (ref 0.7–1.3)
FASTING STATUS PATIENT QL REPORTED: NO
GFR SERPL CREATININE-BSD FRML MDRD: >60 ML/MIN/1.73M2
GLUCOSE BLD-MCNC: 96 MG/DL (ref 70–125)
HDLC SERPL-MCNC: 68 MG/DL
LDLC SERPL CALC-MCNC: 97 MG/DL
POTASSIUM BLD-SCNC: 4.9 MMOL/L (ref 3.5–5)
PSA SERPL-MCNC: 0.2 NG/ML (ref 0–3.5)
SODIUM SERPL-SCNC: 141 MMOL/L (ref 136–145)
TRIGL SERPL-MCNC: 59 MG/DL
TSH SERPL DL<=0.005 MIU/L-ACNC: 1.63 UIU/ML (ref 0.3–5)
URATE SERPL-MCNC: 6.9 MG/DL (ref 3–8)

## 2020-01-02 ENCOUNTER — COMMUNICATION - HEALTHEAST (OUTPATIENT)
Dept: FAMILY MEDICINE | Facility: CLINIC | Age: 60
End: 2020-01-02

## 2020-01-02 LAB — VIT B12 SERPL-MCNC: >2000 PG/ML (ref 213–816)

## 2020-01-22 ENCOUNTER — OFFICE VISIT - HEALTHEAST (OUTPATIENT)
Dept: FAMILY MEDICINE | Facility: CLINIC | Age: 60
End: 2020-01-22

## 2020-01-22 DIAGNOSIS — H61.23 BILATERAL IMPACTED CERUMEN: ICD-10-CM

## 2020-01-22 DIAGNOSIS — Q90.9 DOWN'S SYNDROME: ICD-10-CM

## 2020-01-22 DIAGNOSIS — E53.8 VITAMIN B12 DEFICIENCY: ICD-10-CM

## 2020-01-22 ASSESSMENT — MIFFLIN-ST. JEOR: SCORE: 1901

## 2020-02-19 ENCOUNTER — COMMUNICATION - HEALTHEAST (OUTPATIENT)
Dept: FAMILY MEDICINE | Facility: CLINIC | Age: 60
End: 2020-02-19

## 2020-02-19 DIAGNOSIS — E03.9 HYPOTHYROIDISM: ICD-10-CM

## 2020-02-24 ENCOUNTER — COMMUNICATION - HEALTHEAST (OUTPATIENT)
Dept: FAMILY MEDICINE | Facility: CLINIC | Age: 60
End: 2020-02-24

## 2020-02-24 DIAGNOSIS — H18.9 CORNEAL DISORDER: ICD-10-CM

## 2020-02-26 ENCOUNTER — COMMUNICATION - HEALTHEAST (OUTPATIENT)
Dept: FAMILY MEDICINE | Facility: CLINIC | Age: 60
End: 2020-02-26

## 2020-02-26 ENCOUNTER — OFFICE VISIT - HEALTHEAST (OUTPATIENT)
Dept: FAMILY MEDICINE | Facility: CLINIC | Age: 60
End: 2020-02-26

## 2020-02-26 DIAGNOSIS — H18.9 CORNEAL DISORDER: ICD-10-CM

## 2020-02-26 DIAGNOSIS — E53.8 VITAMIN B12 DEFICIENCY: ICD-10-CM

## 2020-02-26 DIAGNOSIS — E66.01 MORBID OBESITY (H): ICD-10-CM

## 2020-02-26 DIAGNOSIS — M17.0 PRIMARY OSTEOARTHRITIS OF BOTH KNEES: ICD-10-CM

## 2020-02-26 DIAGNOSIS — H61.23 BILATERAL IMPACTED CERUMEN: ICD-10-CM

## 2020-02-26 DIAGNOSIS — Q90.9 DOWN'S SYNDROME: ICD-10-CM

## 2020-02-26 ASSESSMENT — MIFFLIN-ST. JEOR: SCORE: 1896.46

## 2020-03-19 ENCOUNTER — COMMUNICATION - HEALTHEAST (OUTPATIENT)
Dept: FAMILY MEDICINE | Facility: CLINIC | Age: 60
End: 2020-03-19

## 2020-03-23 ENCOUNTER — COMMUNICATION - HEALTHEAST (OUTPATIENT)
Dept: FAMILY MEDICINE | Facility: CLINIC | Age: 60
End: 2020-03-23

## 2020-03-24 ENCOUNTER — COMMUNICATION - HEALTHEAST (OUTPATIENT)
Dept: FAMILY MEDICINE | Facility: CLINIC | Age: 60
End: 2020-03-24

## 2020-03-24 DIAGNOSIS — H18.9 CORNEAL DISORDER: ICD-10-CM

## 2020-04-14 ENCOUNTER — COMMUNICATION - HEALTHEAST (OUTPATIENT)
Dept: FAMILY MEDICINE | Facility: CLINIC | Age: 60
End: 2020-04-14

## 2020-04-14 DIAGNOSIS — H18.9 CORNEAL DISORDER: ICD-10-CM

## 2020-05-28 ENCOUNTER — RECORDS - HEALTHEAST (OUTPATIENT)
Dept: ADMINISTRATIVE | Facility: OTHER | Age: 60
End: 2020-05-28

## 2020-06-09 ENCOUNTER — COMMUNICATION - HEALTHEAST (OUTPATIENT)
Dept: FAMILY MEDICINE | Facility: CLINIC | Age: 60
End: 2020-06-09

## 2020-06-09 ENCOUNTER — AMBULATORY - HEALTHEAST (OUTPATIENT)
Dept: NURSING | Facility: CLINIC | Age: 60
End: 2020-06-09

## 2020-06-09 ENCOUNTER — AMBULATORY - HEALTHEAST (OUTPATIENT)
Dept: FAMILY MEDICINE | Facility: CLINIC | Age: 60
End: 2020-06-09

## 2020-06-09 DIAGNOSIS — E53.8 VITAMIN B12 DEFICIENCY: ICD-10-CM

## 2020-07-08 ENCOUNTER — OFFICE VISIT - HEALTHEAST (OUTPATIENT)
Dept: FAMILY MEDICINE | Facility: CLINIC | Age: 60
End: 2020-07-08

## 2020-07-08 DIAGNOSIS — H61.23 BILATERAL IMPACTED CERUMEN: ICD-10-CM

## 2020-07-08 ASSESSMENT — MIFFLIN-ST. JEOR: SCORE: 1926.23

## 2020-07-24 ENCOUNTER — COMMUNICATION - HEALTHEAST (OUTPATIENT)
Dept: FAMILY MEDICINE | Facility: CLINIC | Age: 60
End: 2020-07-24

## 2020-07-24 DIAGNOSIS — H18.9 CORNEAL DISORDER: ICD-10-CM

## 2020-08-10 ENCOUNTER — OFFICE VISIT - HEALTHEAST (OUTPATIENT)
Dept: FAMILY MEDICINE | Facility: CLINIC | Age: 60
End: 2020-08-10

## 2020-08-10 DIAGNOSIS — Z23 NEED FOR TETANUS BOOSTER: ICD-10-CM

## 2020-08-10 DIAGNOSIS — Q90.9 DOWN'S SYNDROME: ICD-10-CM

## 2020-08-10 DIAGNOSIS — E53.8 VITAMIN B12 DEFICIENCY: ICD-10-CM

## 2020-08-10 DIAGNOSIS — H61.23 BILATERAL IMPACTED CERUMEN: ICD-10-CM

## 2020-08-10 DIAGNOSIS — F03.90 DEMENTIA WITHOUT BEHAVIORAL DISTURBANCE, UNSPECIFIED DEMENTIA TYPE: ICD-10-CM

## 2020-08-10 ASSESSMENT — MIFFLIN-ST. JEOR: SCORE: 1918.07

## 2020-09-10 ENCOUNTER — OFFICE VISIT - HEALTHEAST (OUTPATIENT)
Dept: FAMILY MEDICINE | Facility: CLINIC | Age: 60
End: 2020-09-10

## 2020-09-10 DIAGNOSIS — H61.23 BILATERAL IMPACTED CERUMEN: ICD-10-CM

## 2020-09-10 DIAGNOSIS — Q90.9 DOWN'S SYNDROME: ICD-10-CM

## 2020-09-10 DIAGNOSIS — E53.8 VITAMIN B12 DEFICIENCY: ICD-10-CM

## 2020-09-10 ASSESSMENT — MIFFLIN-ST. JEOR: SCORE: 1899.02

## 2020-09-17 ENCOUNTER — COMMUNICATION - HEALTHEAST (OUTPATIENT)
Dept: FAMILY MEDICINE | Facility: CLINIC | Age: 60
End: 2020-09-17

## 2020-09-17 DIAGNOSIS — L21.9 SEBORRHEIC DERMATITIS: ICD-10-CM

## 2020-10-02 ENCOUNTER — COMMUNICATION - HEALTHEAST (OUTPATIENT)
Dept: FAMILY MEDICINE | Facility: CLINIC | Age: 60
End: 2020-10-02

## 2020-10-02 DIAGNOSIS — K30 INDIGESTION: ICD-10-CM

## 2020-10-12 ENCOUNTER — OFFICE VISIT - HEALTHEAST (OUTPATIENT)
Dept: FAMILY MEDICINE | Facility: CLINIC | Age: 60
End: 2020-10-12

## 2020-10-12 DIAGNOSIS — K30 INDIGESTION: ICD-10-CM

## 2020-10-12 DIAGNOSIS — B35.1 ONYCHOMYCOSIS: ICD-10-CM

## 2020-10-12 DIAGNOSIS — E53.8 VITAMIN B12 DEFICIENCY: ICD-10-CM

## 2020-10-12 DIAGNOSIS — H61.23 BILATERAL IMPACTED CERUMEN: ICD-10-CM

## 2020-10-12 DIAGNOSIS — Q90.9 DOWN'S SYNDROME: ICD-10-CM

## 2020-10-12 ASSESSMENT — MIFFLIN-ST. JEOR: SCORE: 1871.35

## 2020-10-26 ENCOUNTER — OFFICE VISIT - HEALTHEAST (OUTPATIENT)
Dept: FAMILY MEDICINE | Facility: CLINIC | Age: 60
End: 2020-10-26

## 2020-10-26 DIAGNOSIS — M25.472 SWELLING OF ANKLE, LEFT: ICD-10-CM

## 2020-10-26 DIAGNOSIS — Q90.9 DOWN'S SYNDROME: ICD-10-CM

## 2020-10-26 DIAGNOSIS — S99.912A ANKLE INJURY, LEFT, INITIAL ENCOUNTER: ICD-10-CM

## 2020-10-26 DIAGNOSIS — E66.01 MORBID OBESITY (H): ICD-10-CM

## 2020-11-09 ENCOUNTER — OFFICE VISIT - HEALTHEAST (OUTPATIENT)
Dept: FAMILY MEDICINE | Facility: CLINIC | Age: 60
End: 2020-11-09

## 2020-11-09 DIAGNOSIS — Q90.9 DOWN'S SYNDROME: ICD-10-CM

## 2020-11-09 DIAGNOSIS — E53.8 VITAMIN B12 DEFICIENCY: ICD-10-CM

## 2020-11-09 DIAGNOSIS — H61.23 BILATERAL IMPACTED CERUMEN: ICD-10-CM

## 2020-11-09 ASSESSMENT — MIFFLIN-ST. JEOR: SCORE: 1940.29

## 2020-11-17 ENCOUNTER — COMMUNICATION - HEALTHEAST (OUTPATIENT)
Dept: FAMILY MEDICINE | Facility: CLINIC | Age: 60
End: 2020-11-17

## 2020-11-17 DIAGNOSIS — M10.9 GOUTY ARTHROPATHY: ICD-10-CM

## 2020-11-30 ENCOUNTER — RECORDS - HEALTHEAST (OUTPATIENT)
Dept: ADMINISTRATIVE | Facility: OTHER | Age: 60
End: 2020-11-30

## 2020-12-31 ENCOUNTER — OFFICE VISIT (OUTPATIENT)
Dept: FAMILY MEDICINE | Facility: CLINIC | Age: 60
End: 2020-12-31
Payer: MEDICARE

## 2020-12-31 VITALS
TEMPERATURE: 96.7 F | HEART RATE: 67 BPM | WEIGHT: 266.6 LBS | SYSTOLIC BLOOD PRESSURE: 143 MMHG | DIASTOLIC BLOOD PRESSURE: 78 MMHG | OXYGEN SATURATION: 95 %

## 2020-12-31 DIAGNOSIS — M17.0 OSTEOARTHRITIS OF BOTH KNEES, UNSPECIFIED OSTEOARTHRITIS TYPE: ICD-10-CM

## 2020-12-31 DIAGNOSIS — Z11.59 ENCOUNTER FOR HEPATITIS C SCREENING TEST FOR LOW RISK PATIENT: ICD-10-CM

## 2020-12-31 DIAGNOSIS — H61.23 BILATERAL IMPACTED CERUMEN: ICD-10-CM

## 2020-12-31 DIAGNOSIS — Z00.00 ENCOUNTER FOR ANNUAL WELLNESS EXAM IN MEDICARE PATIENT: Primary | ICD-10-CM

## 2020-12-31 DIAGNOSIS — Z13.220 SCREENING CHOLESTEROL LEVEL: ICD-10-CM

## 2020-12-31 DIAGNOSIS — E53.8 VITAMIN B12 DEFICIENCY (NON ANEMIC): ICD-10-CM

## 2020-12-31 DIAGNOSIS — E03.9 ACQUIRED HYPOTHYROIDISM: ICD-10-CM

## 2020-12-31 DIAGNOSIS — G47.33 OSA (OBSTRUCTIVE SLEEP APNEA): ICD-10-CM

## 2020-12-31 DIAGNOSIS — F03.90 DEMENTIA WITHOUT BEHAVIORAL DISTURBANCE, UNSPECIFIED DEMENTIA TYPE: ICD-10-CM

## 2020-12-31 DIAGNOSIS — Q90.9 TRISOMY 21, DOWN SYNDROME: ICD-10-CM

## 2020-12-31 DIAGNOSIS — K21.9 GASTROESOPHAGEAL REFLUX DISEASE, UNSPECIFIED WHETHER ESOPHAGITIS PRESENT: ICD-10-CM

## 2020-12-31 DIAGNOSIS — M1A.9XX0 CHRONIC GOUT WITHOUT TOPHUS, UNSPECIFIED CAUSE, UNSPECIFIED SITE: ICD-10-CM

## 2020-12-31 DIAGNOSIS — Z12.5 SCREENING FOR PROSTATE CANCER: ICD-10-CM

## 2020-12-31 DIAGNOSIS — Z13.1 SCREENING FOR DIABETES MELLITUS: ICD-10-CM

## 2020-12-31 DIAGNOSIS — E66.01 MORBID OBESITY (H): ICD-10-CM

## 2020-12-31 PROBLEM — M10.9 GOUT: Status: ACTIVE | Noted: 2020-12-31

## 2020-12-31 LAB
ERYTHROCYTE [DISTWIDTH] IN BLOOD BY AUTOMATED COUNT: 13.8 % (ref 10–15)
HCT VFR BLD AUTO: 48.2 % (ref 40–53)
HGB BLD-MCNC: 16.2 G/DL (ref 13.3–17.7)
MCH RBC QN AUTO: 33.1 PG (ref 26.5–33)
MCHC RBC AUTO-ENTMCNC: 33.6 G/DL (ref 31.5–36.5)
MCV RBC AUTO: 98 FL (ref 78–100)
PLATELET # BLD AUTO: 183 10E9/L (ref 150–450)
RBC # BLD AUTO: 4.9 10E12/L (ref 4.4–5.9)
WBC # BLD AUTO: 5.9 10E9/L (ref 4–11)

## 2020-12-31 PROCEDURE — G0103 PSA SCREENING: HCPCS | Performed by: FAMILY MEDICINE

## 2020-12-31 PROCEDURE — 86803 HEPATITIS C AB TEST: CPT | Performed by: FAMILY MEDICINE

## 2020-12-31 PROCEDURE — 96372 THER/PROPH/DIAG INJ SC/IM: CPT | Mod: 59 | Performed by: FAMILY MEDICINE

## 2020-12-31 PROCEDURE — 85027 COMPLETE CBC AUTOMATED: CPT | Performed by: FAMILY MEDICINE

## 2020-12-31 PROCEDURE — 36415 COLL VENOUS BLD VENIPUNCTURE: CPT | Performed by: FAMILY MEDICINE

## 2020-12-31 PROCEDURE — 99213 OFFICE O/P EST LOW 20 MIN: CPT | Mod: 25 | Performed by: FAMILY MEDICINE

## 2020-12-31 PROCEDURE — 80061 LIPID PANEL: CPT | Performed by: FAMILY MEDICINE

## 2020-12-31 PROCEDURE — 69209 REMOVE IMPACTED EAR WAX UNI: CPT | Performed by: FAMILY MEDICINE

## 2020-12-31 PROCEDURE — 84550 ASSAY OF BLOOD/URIC ACID: CPT | Performed by: FAMILY MEDICINE

## 2020-12-31 PROCEDURE — 80048 BASIC METABOLIC PNL TOTAL CA: CPT | Performed by: FAMILY MEDICINE

## 2020-12-31 PROCEDURE — 84443 ASSAY THYROID STIM HORMONE: CPT | Performed by: FAMILY MEDICINE

## 2020-12-31 PROCEDURE — 99396 PREV VISIT EST AGE 40-64: CPT | Mod: 25 | Performed by: FAMILY MEDICINE

## 2020-12-31 RX ORDER — LATANOPROST 50 UG/ML
SOLUTION/ DROPS OPHTHALMIC
COMMUNITY
Start: 2019-12-10 | End: 2024-04-02

## 2020-12-31 RX ORDER — FAMOTIDINE 20 MG/1
20 TABLET, FILM COATED ORAL 2 TIMES DAILY
Qty: 180 TABLET | Refills: 3 | Status: SHIPPED | OUTPATIENT
Start: 2020-12-31 | End: 2021-12-13

## 2020-12-31 RX ORDER — CYANOCOBALAMIN 1000 UG/ML
1000 INJECTION, SOLUTION INTRAMUSCULAR; SUBCUTANEOUS
Status: ACTIVE | OUTPATIENT
Start: 2020-12-31 | End: 2021-11-26

## 2020-12-31 RX ORDER — FAMOTIDINE 20 MG/1
20 TABLET, FILM COATED ORAL
COMMUNITY
Start: 2020-10-12 | End: 2020-12-31

## 2020-12-31 RX ORDER — ERYTHROMYCIN 5 MG/G
OINTMENT OPHTHALMIC
COMMUNITY
Start: 2020-03-24 | End: 2021-12-21

## 2020-12-31 RX ORDER — LEVOTHYROXINE SODIUM 75 UG/1
TABLET ORAL
COMMUNITY
Start: 2020-02-22 | End: 2021-12-13

## 2020-12-31 RX ORDER — CYANOCOBALAMIN 1000 UG/ML
1000 INJECTION, SOLUTION INTRAMUSCULAR; SUBCUTANEOUS
COMMUNITY
Start: 2020-06-09 | End: 2021-05-05

## 2020-12-31 RX ORDER — TRIAMCINOLONE ACETONIDE 1 MG/G
CREAM TOPICAL
COMMUNITY
Start: 2020-09-20 | End: 2022-01-18

## 2020-12-31 RX ORDER — ALLOPURINOL 100 MG/1
TABLET ORAL
COMMUNITY
Start: 2020-11-18 | End: 2021-12-13

## 2020-12-31 RX ORDER — POLYVINYL ALCOHOL 14 MG/ML
SOLUTION/ DROPS OPHTHALMIC
COMMUNITY
Start: 2020-02-24

## 2020-12-31 RX ADMIN — CYANOCOBALAMIN 1000 MCG: 1000 INJECTION, SOLUTION INTRAMUSCULAR; SUBCUTANEOUS at 12:02

## 2020-12-31 NOTE — PROGRESS NOTES
SUBJECTIVE:   Chrsitian Bobby is a 60 year old male who presents for Preventive Visit.      Patient has been advised of split billing requirements and indicates understanding: Yes   Are you in the first 12 months of your Medicare coverage?  No    Healthy Habits:     Getting at least 3 servings of Calcium per day:  Yes    Bi-annual eye exam:  Yes    Dental care twice a year:  Yes    Sleep apnea or symptoms of sleep apnea:  Sleep apnea    Diet:  Regular (no restrictions) and Other (portion control)    Frequency of exercise:  None    Duration of exercise:  N/A    Taking medications regularly:  Yes    Barriers to taking medications:  None    Medication side effects:  None    PHQ-2 Total Score: 0    Additional concerns today:  No    Do you feel safe in your environment? Yes    Have you ever done Advance Care Planning? (For example, a Health Directive, POLST, or a discussion with a medical provider or your loved ones about your wishes): Yes, advance care planning is on file.      Fall risk       Cognitive Screening Unable to complete due to virtual visit; need for additional assessment in future face-to-face visit    Do you have sleep apnea, excessive snoring or daytime drowsiness?: sleep apnea    Reviewed and updated as needed this visit by clinical staff   Allergies  Meds              Reviewed and updated as needed this visit by Provider                Social History     Tobacco Use     Smoking status: Not on file   Substance Use Topics     Alcohol use: Not on file     If you drink alcohol do you typically have >3 drinks per day or >7 drinks per week? No    No flowsheet data found.No flowsheet data found.          Patient Care Team:  [facility] as PCP - General    The following health maintenance items are reviewed in Epic and correct as of today:  Health Maintenance   Topic Date Due     PREVENTIVE CARE VISIT  1960     COLORECTAL CANCER SCREENING  09/04/1972     HIV SCREENING  09/04/1977      HEPATITIS C SCREENING  09/04/1980     DTAP/TDAP/TD IMMUNIZATION (1 - Tdap) 09/04/1987     LIPID  09/04/1997     ZOSTER IMMUNIZATION (2 of 3) 01/31/2017     ADVANCE CARE PLANNING  10/08/2024     PHQ-2  Completed     INFLUENZA VACCINE  Completed     Pneumococcal Vaccine: Pediatrics (0 to 5 Years) and At-Risk Patients (6 to 64 Years)  Aged Out     IPV IMMUNIZATION  Aged Out     MENINGITIS IMMUNIZATION  Aged Out     HEPATITIS B IMMUNIZATION  Aged Out           Review of Systems      OBJECTIVE:   BP (!) 143/78   Pulse 67   Temp 96.7  F (35.9  C) (Tympanic)   Wt 120.9 kg (266 lb 9.6 oz)   SpO2 95%  There is no height or weight on file to calculate BMI.  Physical Exam    General appearance: Alert, well-appearing and in no distress  Head: Normocephalic, atraumatic  Eyes: Pupils equal round and reactive, conjunctiva clear, extraocular movements intact  Mouth: Mucous membranes moist, pharynx normal without lesions  Ears: Normal TMs and external ear canals bilaterally  Nose: Nares appears normal, no drainage  Neck: Supple, trachea midline, no adenopathy; thyroid: Not enlarged, symmetric, no tenderness, mass, nodules  Lungs: Clear to auscultation, no wheezes, rales or rhonchi, symmetric air entry  Heart: Normal rate, regular rhythm and normal S1, S2, no murmurs, rubs, clicks or gallops  Abdomen: Soft, moderate discomfort in the epigastric region rebounding, guarding or rigidity. Abdomen is nondistended, no masses or organomegaly  Neurological: Alert, oriented, normal speech, no focal findings or movement disorders noted, normal deep tendon reflexes  Back: Nontender over the spine, symmetric  Extremities: No edema, no clubbing or cyanosis. There is crepitation with flexion and extension of the knees causes some mild discomfort. Gait is mildly unsteady  Skin: Skin color, texture, turgor normal, no rashes or lesions  Psychiatric: Normal affect.  Does not appear anxious or depressed        ASSESSMENT / PLAN:   1. Encounter for  annual wellness exam in Medicare patient  Encouraged him his home staff that he should be eating healthy foods and try to get regular physical activity.    2. Trisomy 21, Down syndrome  Currently living in a group home    3. Dementia without behavioral disturbance, unspecified dementia type (H)  Stable and currently living in a group home    4. Morbid obesity (H)  We discussed lifestyle modifications that can help with this.    5. Acquired hypothyroidism  This issue has been stable on levothyroxine. We will check a TSH and make dosage adjustments if needed.  - TSH with free T4 reflex    6. Osteoarthritis of both knees, unspecified osteoarthritis type  He may continue to follow with his orthopedic specialist regarding this issue.    7. Chronic gout without tophus, unspecified cause, unspecified site  Continue allopurinol. We are checking uric acid level today.  - Uric acid    8. Vitamin B12 deficiency (non anemic)  This issue has been stable with monthly B12 injections.  - CBC with platelets  - cyanocobalamin injection 1,000 mcg    9. Bilateral impacted cerumen  I personally removed a large amount of cerumen from both ear canals myself using the lighted curette. He tolerated this procedure well. He usually comes in once a month to have this procedure performed.  - MN REMOVAL IMPACTED CERUMEN IRRIGATION/LVG UNILAT    10. Gastroesophageal reflux disease, unspecified whether esophagitis present  He appears to be having worsening reflux symptoms and epigastric abdominal discomfort on exam. I recommended increasing the Pepcid dose to 20 mg twice daily instead of once daily. He would likely benefit from decreasing his soda pop intake. We discussed this with the group home in the past.  - famotidine (PEPCID) 20 MG tablet; Take 1 tablet (20 mg) by mouth 2 times daily  Dispense: 180 tablet; Refill: 3    11. MADELAINE (obstructive sleep apnea)  Continue BiPAP    12. Screening for prostate cancer  - Prostate spec antigen  screen    13. Screening for diabetes mellitus  - Basic metabolic panel    14. Screening cholesterol level  - Lipid panel reflex to direct LDL Fasting    15. Encounter for hepatitis C screening test for low risk patient  - Hepatitis C antibody    Patient has been advised of split billing requirements and indicates understanding: Yes  COUNSELING:  Reviewed preventive health counseling, as reflected in patient instructions       Regular exercise       Healthy diet/nutrition    There is no height or weight on file to calculate BMI.    Weight management plan: Discussed healthy diet and exercise guidelines    He has no history on file for tobacco.      Appropriate preventive services were discussed with this patient, including applicable screening as appropriate for cardiovascular disease, diabetes, osteopenia/osteoporosis, and glaucoma.  As appropriate for age/gender, discussed screening for colorectal cancer, prostate cancer, breast cancer, and cervical cancer. Checklist reviewing preventive services available has been given to the patient.    Reviewed patients plan of care and provided an AVS. The Basic Care Plan (routine screening as documented in Health Maintenance) for Christian meets the Care Plan requirement. This Care Plan has been established and reviewed with the Patient and group home staff.    Counseling Resources:  ATP IV Guidelines  Pooled Cohorts Equation Calculator  Breast Cancer Risk Calculator  Breast Cancer: Medication to Reduce Risk  FRAX Risk Assessment  ICSI Preventive Guidelines  Dietary Guidelines for Americans, 2010  [employer]'s MyPlate  ASA Prophylaxis  Lung CA Screening    Clifford Padilla, DO  [facility]    Identified Health Risks:

## 2020-12-31 NOTE — LETTER
January 5, 2021      Christian Bobby  [address] [address]        Dear ,    We are writing to inform you of your test results.    Jhoan, your lab results look great!  Keep up the good work!    Resulted Orders   Basic metabolic panel   Result Value Ref Range    Sodium 139 133 - 144 mmol/L    Potassium 4.7 3.4 - 5.3 mmol/L    Chloride 105 94 - 109 mmol/L    Carbon Dioxide 31 20 - 32 mmol/L    Anion Gap 3 3 - 14 mmol/L    Glucose 92 70 - 99 mg/dL      Comment:      Non Fasting    Urea Nitrogen 17 7 - 30 mg/dL    Creatinine 0.88 0.66 - 1.25 mg/dL    GFR Estimate >90 >60 mL/min/[1.73_m2]      Comment:      Non  GFR Calc  Starting 12/18/2018, serum creatinine based estimated GFR (eGFR) will be   calculated using the Chronic Kidney Disease Epidemiology Collaboration   (CKD-EPI) equation.      GFR Estimate If Black >90 >60 mL/min/[1.73_m2]      Comment:       GFR Calc  Starting 12/18/2018, serum creatinine based estimated GFR (eGFR) will be   calculated using the Chronic Kidney Disease Epidemiology Collaboration   (CKD-EPI) equation.      Calcium 9.1 8.5 - 10.1 mg/dL   CBC with platelets   Result Value Ref Range    WBC 5.9 4.0 - 11.0 10e9/L    RBC Count 4.90 4.4 - 5.9 10e12/L    Hemoglobin 16.2 13.3 - 17.7 g/dL    Hematocrit 48.2 40.0 - 53.0 %    MCV 98 78 - 100 fl    MCH 33.1 (H) 26.5 - 33.0 pg    MCHC 33.6 31.5 - 36.5 g/dL    RDW 13.8 10.0 - 15.0 %    Platelet Count 183 150 - 450 10e9/L   Lipid panel reflex to direct LDL Fasting   Result Value Ref Range    Cholesterol 148 <200 mg/dL    Triglycerides 96 <150 mg/dL      Comment:      Non Fasting    HDL Cholesterol 62 >39 mg/dL    LDL Cholesterol Calculated 67 <100 mg/dL      Comment:      Desirable:       <100 mg/dl    Non HDL Cholesterol 86 <130 mg/dL   Prostate spec antigen screen   Result Value Ref Range    PSA 0.16 0 - 4 ug/L      Comment:      Assay Method:  Chemiluminescence using Siemens Vista analyzer   TSH with  free T4 reflex   Result Value Ref Range    TSH 3.75 0.40 - 4.00 mU/L   Uric acid   Result Value Ref Range    Uric Acid 6.6 3.5 - 7.2 mg/dL   Hepatitis C antibody   Result Value Ref Range    Hepatitis C Antibody Nonreactive NR^Nonreactive      Comment:      Assay performance characteristics have not been established for newborns,   infants, and children         If you have any questions or concerns, please call the clinic at the number listed above.       Sincerely,      Clifford Padilla, DO

## 2020-12-31 NOTE — PROGRESS NOTES
"SUBJECTIVE:   CC: Christian Bobby is an 60 year old male who presents for preventative health visit.       Patient has been advised of split billing requirements and indicates understanding: Yes  Healthy Habits:    Getting at least 3 servings of Calcium per day:  Yes    Bi-annual eye exam:  Yes    Dental care twice a year:  Yes    Sleep apnea or symptoms of sleep apnea:  Sleep apnea    Diet:  Other (no portions)    Frequency of exercise:  None    Duration of exercise:  N/A    Taking medications regularly:  Yes    Barriers to taking medications:  None    Medication side effects:  None    PHQ-2 Total Score:    Additional concerns today:  Yes (feet and legs )      {Outside tests to abstract? :705908}    {additional problems to add (Optional):708277}    Today's PHQ-2 Score:   PHQ-2 ( 1999 [employer]) 12/31/2020   Q1: Little interest or pleasure in doing things 0   Q2: Feeling down, depressed or hopeless 0   PHQ-2 Score 0       Abuse: Current or Past(Physical, Sexual or Emotional)- No  Do you feel safe in your environment? Yes        Social History     Tobacco Use     Smoking status: Not on file   Substance Use Topics     Alcohol use: Not on file     If you drink alcohol do you typically have >3 drinks per day or >7 drinks per week? No    No flowsheet data found.No flowsheet data found.    Last PSA: No results found for: PSA    Reviewed orders with patient. Reviewed health maintenance and updated orders accordingly - { :965979::\"Yes\"}  {Chronicprobdata (optional):316038}    Reviewed and updated as needed this visit by clinical staff   Allergies  Meds              Reviewed and updated as needed this visit by Provider                {HISTORY OPTIONS (Optional):627292}    Review of Systems  {MALE ROS (Optional):050104::\"CONSTITUTIONAL: NEGATIVE for fever, chills, change in weight\",\"INTEGUMENTARY/SKIN: NEGATIVE for worrisome rashes, moles or lesions\",\"EYES: NEGATIVE for vision changes or irritation\",\"ENT: NEGATIVE for ear, " "mouth and throat problems\",\"RESP: NEGATIVE for significant cough or SOB\",\"CV: NEGATIVE for chest pain, palpitations or peripheral edema\",\"GI: NEGATIVE for nausea, abdominal pain, heartburn, or change in bowel habits\",\" male: negative for dysuria, hematuria, decreased urinary stream, erectile dysfunction, urethral discharge\",\"MUSCULOSKELETAL: NEGATIVE for significant arthralgias or myalgia\",\"NEURO: NEGATIVE for weakness, dizziness or paresthesias\",\"PSYCHIATRIC: NEGATIVE for changes in mood or affect\"}    OBJECTIVE:   BP (!) 143/78   Pulse 67   Temp 96.7  F (35.9  C) (Tympanic)   Wt 120.9 kg (266 lb 9.6 oz)   SpO2 95%     Physical Exam  {Exam Choices (Optional):902475}    {Diagnostic Test Results (Optional):507288::\"Diagnostic Test Results:\",\"Labs reviewed in Epic\"}    ASSESSMENT/PLAN:   {Diag Picklist:931636}    Patient has been advised of split billing requirements and indicates understanding: {YES / NO:130643::\"Yes\"}  COUNSELING:   {MALE COUNSELING MESSAGES:103759::\"Reviewed preventive health counseling, as reflected in patient instructions\"}    There is no height or weight on file to calculate BMI.     {Weight Management Plan (ACO) Complete if BMI is abnormal-  Ages 18-64  BMI >24.9.  Age 65+ with BMI <23 or >30 (Optional):894045}    He has no history on file for tobacco.      Counseling Resources:  ATP IV Guidelines  Pooled Cohorts Equation Calculator  FRAX Risk Assessment  ICSI Preventive Guidelines  Dietary Guidelines for Americans, 2010  USDA's MyPlate  ASA Prophylaxis  Lung CA Screening    Clifford Padilla, DO  [facility]    He is at risk for lack of exercise and has been provided with information to increase physical activity for the benefit of his well-being.  "

## 2020-12-31 NOTE — PROGRESS NOTES
"SUBJECTIVE:   Christian Bobby is a 60 year old male who presents for Preventive Visit.    {Split Bill scripting  The purpose of this visit is to discuss your medical history and prevent health problems before you are sick. You may be responsible for a co-pay, coinsurance, or deductible if your visit today includes services such as checking on a sore throat, having an x-ray or lab test, or treating and evaluating a new or existing condition :202407}  Patient has been advised of split billing requirements and indicates understanding: {Yes and No:449176}   Are you in the first 12 months of your Medicare coverage?  { :785021::\"No\"}    HPI  Do you feel safe in your environment? { :549437}    Have you ever done Advance Care Planning? (For example, a Health Directive, POLST, or a discussion with a medical provider or your loved ones about your wishes): { :771351}    {Hearing Test Done (Optional):841027}  Fall risk  { :734737}  {If any of the above assessments are answered yes, consider ordering appropriate referrals (Optional):273269::\"click delete button to remove this line now\"}  Cognitive Screening { :134845}    {Do you have sleep apnea, excessive snoring or daytime drowsiness? (Optional):347481}    Reviewed and updated as needed this visit by clinical staff   Allergies  Meds              Reviewed and updated as needed this visit by Provider                Social History     Tobacco Use     Smoking status: Not on file   Substance Use Topics     Alcohol use: Not on file     {Rooming Staff- Complete this question if Prescreen response is not shown below for today's visit. If you drink alcohol do you typically have >3 drinks per day or >7 drinks per week? (Optional):790051}    No flowsheet data found.{add AUDIT responses (Optional) (A score of 7 for adult men is an indication of hazardous drinking; a score of 8 or more is an indication of an alcohol use disorder.  A score of 7 or more for adult women is an indication " "of hazardous drinking or an alchohol use disorder):418523}    {Outside tests to abstract? :012690}    {additional problems to add (Optional):199865}    Current providers sharing in care for this patient include: {Rooming staff:  Please update Care Team in Rooming Activity, refresh this note and then delete this statement}  Patient Care Team:  [facility] as PCP - General    The following health maintenance items are reviewed in Epic and correct as of today:  Health Maintenance   Topic Date Due     PREVENTIVE CARE VISIT  1962     COLORECTAL CANCER SCREENING  1972     HIV SCREENING  1977     HEPATITIS C SCREENING  1980     DTAP/TDAP/TD IMMUNIZATION (1 - Tdap) 1987     LIPID  1997     ZOSTER IMMUNIZATION (2 of 3) 2017     ADVANCE CARE PLANNING  10/08/2024     PHQ-2  Completed     INFLUENZA VACCINE  Completed     Pneumococcal Vaccine: Pediatrics (0 to 5 Years) and At-Risk Patients (6 to 64 Years)  Aged Out     IPV IMMUNIZATION  Aged Out     MENINGITIS IMMUNIZATION  Aged Out     HEPATITIS B IMMUNIZATION  Aged Out     {Chronicprobdata (optional):780516}  {Decision Support (Optional):141992}    Review of Systems  {ROS COMP (Optional):017526}    OBJECTIVE:   BP (!) 143/78   Pulse 67   Temp 96.7  F (35.9  C) (Tympanic)   Wt 120.9 kg (266 lb 9.6 oz)   SpO2 95%  There is no height or weight on file to calculate BMI.  Physical Exam  {Exam (Optional) :733479}    {Diagnostic Test Results (Optional):017899::\"Diagnostic Test Results:\",\"Labs reviewed in Epic\"}    ASSESSMENT / PLAN:   {Diag Picklist:551588}    Patient has been advised of split billing requirements and indicates understanding: {YES / NO:134263::\"Yes\"}  COUNSELING:  {Medicare Counselin}    There is no height or weight on file to calculate BMI.    {Weight Management Plan (ACO) Complete if BMI is abnormal-  Ages 18-64  BMI >24.9.  Age 65+ with BMI <23 or >30 (Optional):192185}    He has no history on file " for tobacco.      Appropriate preventive services were discussed with this patient, including applicable screening as appropriate for cardiovascular disease, diabetes, osteopenia/osteoporosis, and glaucoma.  As appropriate for age/gender, discussed screening for colorectal cancer, prostate cancer, breast cancer, and cervical cancer. Checklist reviewing preventive services available has been given to the patient.    Reviewed patients plan of care and provided an AVS. The {CarePlan:971045} for Christian meets the Care Plan requirement. This Care Plan has been established and reviewed with the {PATIENT, FAMILY MEMBER, CAREGIVER:850523}.    Counseling Resources:  ATP IV Guidelines  Pooled Cohorts Equation Calculator  Breast Cancer Risk Calculator  Breast Cancer: Medication to Reduce Risk  FRAX Risk Assessment  ICSI Preventive Guidelines  Dietary Guidelines for Americans, 2010  USDA's MyPlate  ASA Prophylaxis  Lung CA Screening    DO JORGE Gandhi [facility]    Identified Health Risks:

## 2020-12-31 NOTE — PATIENT INSTRUCTIONS
Patient Education   Personalized Prevention Plan  You are due for the preventive services outlined below.  Your care team is available to assist you in scheduling these services.  If you have already completed any of these items, please share that information with your care team to update in your medical record.  Health Maintenance Due   Topic Date Due     Preventive Care Visit  1960     Colorectal Cancer Screening  09/04/1972     HIV Screening  09/04/1977     Hepatitis C Screening  09/04/1980     Diptheria Tetanus Pertussis (DTAP/TDAP/TD) Vaccine (1 - Tdap) 09/04/1987     Cholesterol Lab  09/04/1997     Zoster (Shingles) Vaccine (2 of 3) 01/31/2017     PHQ-2  01/01/2020        Preventive Health Recommendations  Male Ages 50 - 64    Yearly exam:             See your health care provider every year in order to  o   Review health changes.   o   Discuss preventive care.    o   Review your medicines if your doctor has prescribed any.     Have a cholesterol test every 5 years, or more frequently if you are at risk for high cholesterol/heart disease.     Have a diabetes test (fasting glucose) every three years. If you are at risk for diabetes, you should have this test more often.     Have a colonoscopy at age 50, or have a yearly FIT test (stool test). These exams will check for colon cancer.      Talk with your health care provider about whether or not a prostate cancer screening test (PSA) is right for you.    You should be tested each year for STDs (sexually transmitted diseases), if you re at risk.     Shots: Get a flu shot each year. Get a tetanus shot every 10 years.     Nutrition:    Eat at least 5 servings of fruits and vegetables daily.     Eat whole-grain bread, whole-wheat pasta and brown rice instead of white grains and rice.     Get adequate Calcium and Vitamin D.     Lifestyle    Exercise for at least 150 minutes a week (30 minutes a day, 5 days a week). This will help you control your weight and  prevent disease.     Limit alcohol to one drink per day.     No smoking.     Wear sunscreen to prevent skin cancer.     See your dentist every six months for an exam and cleaning.     See your eye doctor every 1 to 2 years.    Patient Education   Personalized Prevention Plan  You are due for the preventive services outlined below.  Your care team is available to assist you in scheduling these services.  If you have already completed any of these items, please share that information with your care team to update in your medical record.  Health Maintenance Due   Topic Date Due     Preventive Care Visit  1960     Colorectal Cancer Screening  09/04/1972     HIV Screening  09/04/1977     Hepatitis C Screening  09/04/1980     Diptheria Tetanus Pertussis (DTAP/TDAP/TD) Vaccine (1 - Tdap) 09/04/1987     Cholesterol Lab  09/04/1997     Zoster (Shingles) Vaccine (2 of 3) 01/31/2017       Exercise for a Healthier Heart     Exercise with a friend. When activity is fun, you're more likely to stick with it.   You may wonder how you can improve the health of your heart. If you re thinking about exercise, you re on the right track. You don t need to become an athlete. But you do need a certain amount of brisk exercise to help strengthen your heart. If you have been diagnosed with a heart condition, your healthcare provider may advise exercise to help stabilize your condition. To help make exercise a habit, choose safe, fun activities.   Before you start  Check with your healthcare provider before starting an exercise program. This is especially important if you have not been active for a while. It's also important if you have a long-term (chronic) health problem such as heart disease, diabetes, or obesity. Or if you are at high risk for having these problems.   Why exercise?  Exercising regularly offers many healthy rewards. It can help you do all of the following:    Improve your blood cholesterol level to help prevent  further heart trouble    Lower your blood pressure to help prevent a stroke or heart attack    Control diabetes, or reduce your risk of getting this disease    Improve your heart and lung function    Reach and stay at a healthy weight    Make your muscles stronger so you can stay active    Prevent falls and fractures by slowing the loss of bone mass (osteoporosis)    Manage stress better    Reduce your blood pressure    Improve your sense of self and your body image  Exercise tips      Ease into your routine. Set small goals. Then build on them. If you are not sure what your activity level should be, talk with your healthcare provider first before starting an exercise routine.    Exercise on most days. Aim for a total of 150 minutes (2 hours and 30 minutes) or more of moderate-intensity aerobic activity each week. Or 75 minutes (1 hour and 15 minutes) or more of vigorous-intensity aerobic activity each week. Or try for a combination of both. Moderate activity means that you breathe heavier and your heart rate increases but you can still talk. Think about doing 40 minutes of moderate exercise, 3 to 4 times a week. For best results, activity should last for about 40 minutes to lower blood pressure and cholesterol. It's OK to work up to the 40-minute period over time. Examples of moderate-intensity activity are walking 1 mile in 15 minutes. Or doing 30 to 45 minutes of yard work.    Step up your daily activity level.  Along with your exercise program, try being more active the whole day. Walk instead of drive. Or park further away so that you take more steps each day. Do more household tasks or yard work. You may not be able to meet the advised mount of physical activity. But doing some moderate- or vigorous-intensity aerobic activity can help reduce your risk for heart disease. Your healthcare provider can help you figure out what is best for you.    Choose 1 or more activities you enjoy.  Walking is one of the  easiest things you can do. You can also try swimming, riding a bike, dancing, or taking an exercise class.    When to call your healthcare provider  Call your healthcare provider if you have any of these:     Chest pain or feel dizzy or lightheaded    Burning, tightness, pressure, or heaviness in your chest, neck, shoulders, back, or arms    Abnormal shortness of breath    More joint or muscle pain    A very fast or irregular heartbeat (palpitations)  [employer] last reviewed this educational content on 7/1/2019 2000-2020 [employer]. [address] [place] [id number]. All rights reserved. This information is not intended as a substitute for professional medical care. Always follow your healthcare professional's instructions.

## 2021-01-01 LAB
ANION GAP SERPL CALCULATED.3IONS-SCNC: 3 MMOL/L (ref 3–14)
BUN SERPL-MCNC: 17 MG/DL (ref 7–30)
CALCIUM SERPL-MCNC: 9.1 MG/DL (ref 8.5–10.1)
CHLORIDE SERPL-SCNC: 105 MMOL/L (ref 94–109)
CHOLEST SERPL-MCNC: 148 MG/DL
CO2 SERPL-SCNC: 31 MMOL/L (ref 20–32)
CREAT SERPL-MCNC: 0.88 MG/DL (ref 0.66–1.25)
GFR SERPL CREATININE-BSD FRML MDRD: >90 ML/MIN/{1.73_M2}
GLUCOSE SERPL-MCNC: 92 MG/DL (ref 70–99)
HDLC SERPL-MCNC: 62 MG/DL
LDLC SERPL CALC-MCNC: 67 MG/DL
NONHDLC SERPL-MCNC: 86 MG/DL
POTASSIUM SERPL-SCNC: 4.7 MMOL/L (ref 3.4–5.3)
PSA SERPL-ACNC: 0.16 UG/L (ref 0–4)
SODIUM SERPL-SCNC: 139 MMOL/L (ref 133–144)
TRIGL SERPL-MCNC: 96 MG/DL
TSH SERPL DL<=0.005 MIU/L-ACNC: 3.75 MU/L (ref 0.4–4)
URATE SERPL-MCNC: 6.6 MG/DL (ref 3.5–7.2)

## 2021-01-03 LAB — HCV AB SERPL QL IA: NONREACTIVE

## 2021-01-26 ENCOUNTER — OFFICE VISIT (OUTPATIENT)
Dept: FAMILY MEDICINE | Facility: CLINIC | Age: 61
End: 2021-01-26
Payer: MEDICARE

## 2021-01-26 VITALS
HEART RATE: 64 BPM | WEIGHT: 263 LBS | OXYGEN SATURATION: 98 % | RESPIRATION RATE: 14 BRPM | DIASTOLIC BLOOD PRESSURE: 76 MMHG | SYSTOLIC BLOOD PRESSURE: 121 MMHG

## 2021-01-26 DIAGNOSIS — Q90.9 TRISOMY 21, DOWN SYNDROME: ICD-10-CM

## 2021-01-26 DIAGNOSIS — E53.8 VITAMIN B12 DEFICIENCY (NON ANEMIC): ICD-10-CM

## 2021-01-26 DIAGNOSIS — H61.23 BILATERAL IMPACTED CERUMEN: Primary | ICD-10-CM

## 2021-01-26 PROCEDURE — 96372 THER/PROPH/DIAG INJ SC/IM: CPT | Mod: 59 | Performed by: FAMILY MEDICINE

## 2021-01-26 PROCEDURE — 99207 PR DROP WITH A PROCEDURE: CPT | Performed by: FAMILY MEDICINE

## 2021-01-26 PROCEDURE — 69210 REMOVE IMPACTED EAR WAX UNI: CPT | Mod: LT | Performed by: FAMILY MEDICINE

## 2021-01-26 RX ADMIN — CYANOCOBALAMIN 1000 MCG: 1000 INJECTION, SOLUTION INTRAMUSCULAR; SUBCUTANEOUS at 13:44

## 2021-01-26 ASSESSMENT — PAIN SCALES - GENERAL: PAINLEVEL: NO PAIN (0)

## 2021-01-26 NOTE — PROGRESS NOTES
Assessment & Plan     Bilateral impacted cerumen  - [place] REMOVAL IMPACTED CERUMEN IRRIGATION/LVG UNILAT    Vitamin B12 deficiency (non anemic)    Trisomy 21, Down syndrome    I personally removed the impacted cerumen from his ear canals myself using the lighted curette.  He tolerated the procedure well.  He may follow-up in 1-2 months for reevaluation.  He was given his vitamin B12 injection today.  He will continue to follow-up with audiology as scheduled.          Return in about 4 weeks (around 2/23/2021) for Follow up ear cleaning and B12.    Clifford Padilla [facility]    Melina Staplse is a 60 year old who presents to clinic today for the following health issues  accompanied by his group home staff member:    RIVERA     Christian Bobby is a 60 y.o. male with a history of Down's syndrome who presents to the clinic for follow-up. He has a history of cerumen impaction and needs to have the cerumen removed every 1-2 months. A staff person from his group home is present and reports that the wax buildup seems worse over the past few weeks. He wears hearing aids in both ears and has regular audiology appointments every 3 months. He is not complaining of pain or drainage in his ears. His energy level has been good. He also has a history of vitamin B12 deficiency and is due for his next injection.             Review of Systems   Constitutional, HEENT, cardiovascular, pulmonary, gi and gu systems are negative, except as otherwise noted.      Objective    There were no vitals taken for this visit.  There is no height or weight on file to calculate BMI.  Physical Exam   GENERAL: healthy, alert and no distress  EYES: Eyes grossly normal to inspection, PERRL and conjunctivae and sclerae normal  HENT: Both canals are impacted with cerumen.  Once this was removed the canals and tympanic membranes appeared clear.  NECK: no adenopathy, no asymmetry, masses, or scars and thyroid normal to  palpation  RESP: lungs clear to auscultation - no rales, rhonchi or wheezes  CV: regular rate and rhythm, normal S1 S2, no S3 or S4, no murmur, click or rub, no peripheral edema and peripheral pulses strong  PSYCH: mentation appears normal, affect normal/bright

## 2021-01-26 NOTE — NURSING NOTE
Clinic Administered Medication Documentation      Injectable Medication Documentation    Patient was given Cyanocobalamin (B-12). Prior to medication administration, verified patients identity using patient s name and date of birth. Please see MAR and medication order for additional information. Patient instructed to remain in clinic for 15 minutes.      Was entire vial of medication used? Yes  Vial/Syringe: Single dose vial  Expiration Date:  August 2021  Was this medication supplied by the patient? No    The following medication was given:     MEDICATION: Vitamin B12  1000 mcg  ROUTE: IM  SITE: Deltoid - Left  DOSE: 1 mL  LOT #: [id number]  :  [employer]  EXPIRATION DATE:  August 2021  NDC: 6571-1847-19

## 2021-04-21 ENCOUNTER — OFFICE VISIT (OUTPATIENT)
Dept: FAMILY MEDICINE | Facility: CLINIC | Age: 61
End: 2021-04-21
Payer: MEDICARE

## 2021-04-21 VITALS
WEIGHT: 250.9 LBS | HEART RATE: 52 BPM | SYSTOLIC BLOOD PRESSURE: 92 MMHG | OXYGEN SATURATION: 98 % | BODY MASS INDEX: 39.38 KG/M2 | TEMPERATURE: 97.5 F | HEIGHT: 67 IN | RESPIRATION RATE: 20 BRPM | DIASTOLIC BLOOD PRESSURE: 56 MMHG

## 2021-04-21 DIAGNOSIS — Q90.9 TRISOMY 21, DOWN SYNDROME: ICD-10-CM

## 2021-04-21 DIAGNOSIS — E53.8 VITAMIN B12 DEFICIENCY (NON ANEMIC): ICD-10-CM

## 2021-04-21 DIAGNOSIS — H61.23 BILATERAL IMPACTED CERUMEN: Primary | ICD-10-CM

## 2021-04-21 PROCEDURE — 96372 THER/PROPH/DIAG INJ SC/IM: CPT | Mod: 59 | Performed by: FAMILY MEDICINE

## 2021-04-21 PROCEDURE — 69210 REMOVE IMPACTED EAR WAX UNI: CPT | Performed by: FAMILY MEDICINE

## 2021-04-21 PROCEDURE — 99207 PR DROP WITH A PROCEDURE: CPT | Performed by: FAMILY MEDICINE

## 2021-04-21 RX ORDER — AMOXICILLIN 500 MG/1
CAPSULE ORAL
COMMUNITY
Start: 2021-04-19 | End: 2021-12-13

## 2021-04-21 RX ADMIN — CYANOCOBALAMIN 1000 MCG: 1000 INJECTION, SOLUTION INTRAMUSCULAR; SUBCUTANEOUS at 15:19

## 2021-04-21 ASSESSMENT — MIFFLIN-ST. JEOR: SCORE: 1916.7

## 2021-04-21 NOTE — PROGRESS NOTES
"    Assessment & Plan     Bilateral impacted cerumen  - [place] REMOVAL IMPACTED CERUMEN IRRIGATION/LVG UNILAT    Vitamin B12 deficiency (non anemic)    Trisomy 21, Down syndrome      I personally removed the impacted cerumen from his ear canals myself using the lighted curette.  He tolerated the procedure well.  He may follow-up in 1-2 months for reevaluation.  He was given his vitamin B12 injection today.  He will continue to follow-up with audiology as scheduled.                   Return in about 4 weeks (around 5/19/2021) for Follow up cerumen impaction and B12 deficiency.    Clifford Padilla [facility]    Melina Staples is a 60 year old who presents for the following health issues  accompanied by staff person from group [place]:    HPI     Pt here for ear wash and B12 injection.    Christian Bobby is a 60 y.o. male with a history of Down's syndrome who presents to the clinic for follow-up. He has a history of cerumen impaction and needs to have the cerumen removed every 1-2 months. A staff person from his group home is present and reports that the wax buildup seems worse over the past few weeks. He wears hearing aids in both ears and has regular audiology appointments every 3 months. He is not complaining of pain or drainage in his ears. His energy level has been good. He also has a history of vitamin B12 deficiency and is due for his next injection.          Review of Systems         Objective    BP 92/56 (Patient Position: Sitting, Cuff Size: Adult Large)   Pulse 52   Temp 97.5  F (36.4  C) (Tympanic)   Resp 20   Ht 1.702 m (5' 7\")   Wt 113.8 kg (250 lb 14.4 oz)   SpO2 98%   BMI 39.30 kg/m    Body mass index is 39.3 kg/m .  Physical Exam     GENERAL: healthy, alert and no distress  EYES: Eyes grossly normal to inspection, PERRL and conjunctivae and sclerae normal  HENT: Both canals are impacted with cerumen.  Once this was removed the canals and tympanic membranes appeared " clear.  NECK: no adenopathy, no asymmetry, masses, or scars and thyroid normal to palpation  RESP: lungs clear to auscultation - no rales, rhonchi or wheezes  CV: regular rate and rhythm, normal S1 S2, no S3 or S4, no murmur, click or rub, no peripheral edema and peripheral pulses strong  PSYCH: mentation appears normal, affect normal/bright

## 2021-04-21 NOTE — NURSING NOTE
The following medication was given:     MEDICATION: Vitamin B12  1000mcg  ROUTE: IM  SITE: Deltoid - Left  DOSE: 1 mL  LOT #:   :  [employer]  EXPIRATION DATE:  10/1/2022  NDC: 39081-687-97    Clinic Administered Medication Documentation      Injectable Medication Documentation    Patient was given Cyanocobalamin (B-12). Prior to medication administration, verified patients identity using patient s name and date of birth. Please see MAR and medication order for additional information. Patient instructed to remain in clinic for 15 minutes, report any adverse reaction to staff immediately  and stay in clinic after the injection but patient declined.      Was entire vial of medication used? Yes  Vial/Syringe: Single dose vial  Expiration Date:  10/1/2022  Was this medication supplied by the patient? No    Lorna Garcia MA on 4/21/2021 at 3:20 PM

## 2021-05-07 NOTE — PROGRESS NOTES
"    Assessment & Plan     Bilateral impacted cerumen    Vitamin B12 deficiency (non anemic)    Trisomy 21, Down syndrome    Screening for colon cancer  - Fecal colorectal cancer screen (FIT); Future    I personally removed the impacted cerumen from his ear canals myself using the lighted curette.  He tolerated the procedure well.  He may follow-up in 1-2 months for reevaluation.  He was given his vitamin B12 injection today.  He will continue to follow-up with audiology as scheduled                 Return in about 4 weeks (around 6/7/2021) for Follow up.    Clifford Padilla DO  [facility]    Melina Staples is a 60 year old who presents for the following health issues accompanied by his home health aide    HPI     Pt here for cerumen impaction and B12 deficiency f/u.     Christian Bobby is a 60 y.o. male with a history of Down's syndrome who presents to the clinic for follow-up. He has a history of cerumen impaction and needs to have the cerumen removed every 1-2 months. A staff person from his group home is present and reports that the wax buildup seems worse over the past few weeks. He wears hearing aids and has regular audiology appointments every few months. He is not complaining of pain or drainage in his ears. His energy level has been good. He also has a history of vitamin B12 deficiency and is due for his next injection.        Review of Systems         Objective    Resp 22   Ht 1.702 m (5' 7\")   Wt 115.2 kg (254 lb)   BMI 39.78 kg/m    Body mass index is 39.78 kg/m .  Physical Exam     GENERAL: healthy, alert and no distress  EYES: Eyes grossly normal to inspection, PERRL and conjunctivae and sclerae normal  HENT: Both canals are impacted with cerumen.  Once this was removed the canals and tympanic membranes appeared clear.  NECK: no adenopathy, no asymmetry, masses, or scars and thyroid normal to palpation  RESP: lungs clear to auscultation - no rales, rhonchi or wheezes  CV: " regular rate and rhythm, normal S1 S2, no S3 or S4, no murmur, click or rub, no peripheral edema and peripheral pulses strong  PSYCH: mentation appears normal, affect normal/bright

## 2021-05-10 ENCOUNTER — OFFICE VISIT (OUTPATIENT)
Dept: FAMILY MEDICINE | Facility: CLINIC | Age: 61
End: 2021-05-10
Payer: MEDICARE

## 2021-05-10 VITALS
WEIGHT: 254 LBS | TEMPERATURE: 97.3 F | DIASTOLIC BLOOD PRESSURE: 71 MMHG | RESPIRATION RATE: 22 BRPM | HEART RATE: 58 BPM | BODY MASS INDEX: 39.87 KG/M2 | HEIGHT: 67 IN | OXYGEN SATURATION: 97 % | SYSTOLIC BLOOD PRESSURE: 107 MMHG

## 2021-05-10 DIAGNOSIS — Q90.9 TRISOMY 21, DOWN SYNDROME: ICD-10-CM

## 2021-05-10 DIAGNOSIS — Z12.11 SCREENING FOR COLON CANCER: ICD-10-CM

## 2021-05-10 DIAGNOSIS — E53.8 VITAMIN B12 DEFICIENCY (NON ANEMIC): ICD-10-CM

## 2021-05-10 DIAGNOSIS — H61.23 BILATERAL IMPACTED CERUMEN: Primary | ICD-10-CM

## 2021-05-10 PROCEDURE — 69210 REMOVE IMPACTED EAR WAX UNI: CPT | Performed by: FAMILY MEDICINE

## 2021-05-10 PROCEDURE — 96372 THER/PROPH/DIAG INJ SC/IM: CPT | Mod: 59 | Performed by: FAMILY MEDICINE

## 2021-05-10 PROCEDURE — 99207 PR DROP WITH A PROCEDURE: CPT | Performed by: FAMILY MEDICINE

## 2021-05-10 RX ADMIN — CYANOCOBALAMIN 1000 MCG: 1000 INJECTION, SOLUTION INTRAMUSCULAR; SUBCUTANEOUS at 11:41

## 2021-05-10 ASSESSMENT — MIFFLIN-ST. JEOR: SCORE: 1930.77

## 2021-05-10 NOTE — NURSING NOTE
The following medication was given:     MEDICATION: Vitamin B12  1000 mcg  ROUTE: IM  SITE: Deltoid - Left  DOSE: 1 mL  LOT #:   :  [employer]  EXPIRATION DATE:  11/30/2022  NDC: 11378-019-76    Clinic Administered Medication Documentation      Injectable Medication Documentation    Patient was given Cyanocobalamin (B-12). Prior to medication administration, verified patients identity using patient s name and date of birth. Please see MAR and medication order for additional information. Patient instructed to remain in clinic for 15 minutes, report any adverse reaction to staff immediately  and stay in clinic after the injection but patient declined.      Was entire vial of medication used? Yes  Vial/Syringe: Single dose vial  Expiration Date:  11/30/2022  Was this medication supplied by the patient? No    Lorna Garcia MA on 5/10/2021 at 11:42 AM

## 2021-05-25 ENCOUNTER — RECORDS - HEALTHEAST (OUTPATIENT)
Dept: ADMINISTRATIVE | Facility: CLINIC | Age: 61
End: 2021-05-25

## 2021-05-26 ENCOUNTER — RECORDS - HEALTHEAST (OUTPATIENT)
Dept: ADMINISTRATIVE | Facility: CLINIC | Age: 61
End: 2021-05-26

## 2021-05-27 NOTE — PROGRESS NOTES
"Assessment / Impression     1. Bilateral impacted cerumen     2. Vitamin B12 deficiency     3. Trisomy 21 (Down Syndrome)         Plan:     I removed the impacted cerumen from his ear canals myself using the lighted spatula. He tolerated the procedure well.  I recommended he follow-up in 1 month to reevaluate this.  He was given his vitamin B12 injection today.  He will be following up with audiology as scheduled.    Subjective:      HPI: Christian Bobby is a 58 y.o. male with a history of Down's syndrome who presents to the clinic for follow-up.  He has a history of cerumen impaction and needs to have the cerumen removed every 1-2 months.  A staff person from his group home is present and reports that the wax buildup seems worse over the past few weeks.  He wears hearing aids in both ears and has regular audiology appointments every 3 months.  He is not complaining of pain in his ears.  He also has a history of vitamin B12 deficiency and is due for his next injection.      Review of Systems  All other systems reviewed and are negative.     Social History     Tobacco Use   Smoking Status Never Smoker   Smokeless Tobacco Never Used       No family history on file.    Objective:     /60 (Patient Site: Left Arm, Patient Position: Sitting, Cuff Size: Adult Large)   Pulse 68   Temp 99.1  F (37.3  C) (Oral)   Resp 16   Ht 5' 5.5\" (1.664 m)   Wt (!) 258 lb (117 kg)   BMI 42.28 kg/m    Physical Examination: General appearance - alert, well appearing, and in no distress  Eyes:  extraocular eye movements intact  Ears: Bilateral canals are impacted with cerumen.  Once this was removed the tympanic membranes appeared clear.  Neck: No lymphadenopathy  Heart: Regular rate and rhythm  Lungs: Clear to auscultation bilaterally.      No results found for this or any previous visit (from the past 168 hour(s)).    Current Outpatient Medications   Medication Sig Note     allopurinol (ZYLOPRIM) 100 MG tablet TAKE 1 TABLET BY " MOUTH ONCE DAILY.      artificial tears,hypromellose, (GENTEAL; SYSTANE) 0.3 % Gel Administer 1 drop to both eyes 2 (two) times a day.      disposable gloves Misc Extra large and large non latex gloves for medication application, use by staff applying eye gtts given 8 times a day and eye ointment BID x 10 days.  #20 gloves daily.  #100 extra-large and #100 large.      erythromycin ophthalmic ointment apply (1CM)  by ophthalmic route 1 time every day ribbon into the lower conjunctival sac(s) in the affected eye(s) 2/3/2016: Received from: [facility]     latanoprost (XALATAN) 0.005 % ophthalmic solution Administer 1 drop to the right eye bedtime.      levothyroxine (SYNTHROID, LEVOTHROID) 75 MCG tablet TAKE 1 TABLET BY MOUTH ONCE DAILY      saliva stimulant (BIOTENE ORALBALANCE) Gel gel by Mucous Membrane route as needed.      timolol (BETIMOL) 0.5 % ophthalmic solution Administer 1 drop to the right eye daily.      triamcinolone (KENALOG) 0.1 % cream Apply to affected area once daily as needed for dry skin 4/25/2018: prn

## 2021-05-28 NOTE — PROGRESS NOTES
"Assessment / Impression     1. Bilateral impacted cerumen     2. Vitamin B12 deficiency     3. Trisomy 21 (Down Syndrome)     4. Screen for colon cancer         Plan:     I removed the impacted cerumen from his ear canals myself using the lighted spatula. He tolerated the procedure well.  I recommended he follow-up in 1 month to reevaluate this.  He was given his vitamin B12 injection today.  He will be following up with audiology as scheduled.    Subjective:      HPI: Christian Bobby is a 58 y.o. male with a history of Down's syndrome who presents to the clinic for follow-up.  He has a history of cerumen impaction and needs to have the cerumen removed every 1-2 months.  A staff person from his group home is present and reports that the wax buildup seems worse over the past few weeks.  He wears hearing aids in both ears and has regular audiology appointments every 3 months.  He is not complaining of pain or drainage in his ears.  His energy level has been good.  He also has a history of vitamin B12 deficiency and is due for his next injection.      Review of Systems  All other systems reviewed and are negative.     Social History     Tobacco Use   Smoking Status Never Smoker   Smokeless Tobacco Never Used       No family history on file.    Objective:     /60 (Patient Site: Right Arm, Patient Position: Sitting, Cuff Size: Adult Large)   Pulse 68   Temp 98.8  F (37.1  C) (Oral)   Resp 16   Ht 5' 5.5\" (1.664 m)   Wt (!) 257 lb 3 oz (116.7 kg)   BMI 42.15 kg/m    Physical Examination: General appearance - alert, well appearing, and in no distress  Eyes:  extraocular eye movements intact  Ears: Bilateral canals are impacted with cerumen.  Once this was removed the tympanic membranes appeared clear.  Neck: No lymphadenopathy  Heart: Regular rate and rhythm  Lungs: Clear to auscultation bilaterally.      No results found for this or any previous visit (from the past 168 hour(s)).    Current Outpatient " Medications   Medication Sig Note     allopurinol (ZYLOPRIM) 100 MG tablet TAKE 1 TABLET BY MOUTH ONCE DAILY.      artificial tears,hypromellose, (GENTEAL; SYSTANE) 0.3 % Gel Administer 1 drop to both eyes 2 (two) times a day.      disposable gloves Misc Extra large and large non latex gloves for medication application, use by staff applying eye gtts given 8 times a day and eye ointment BID x 10 days.  #20 gloves daily.  #100 extra-large and #100 large.      erythromycin ophthalmic ointment apply (1CM)  by ophthalmic route 1 time every day ribbon into the lower conjunctival sac(s) in the affected eye(s) 2/3/2016: Received from: [facility]     latanoprost (XALATAN) 0.005 % ophthalmic solution Administer 1 drop to the right eye bedtime.      levothyroxine (SYNTHROID, LEVOTHROID) 75 MCG tablet TAKE 1 TABLET BY MOUTH ONCE DAILY      saliva stimulant (BIOTENE ORALBALANCE) Gel gel by Mucous Membrane route as needed.      timolol (BETIMOL) 0.5 % ophthalmic solution Administer 1 drop to the right eye daily.      triamcinolone (KENALOG) 0.1 % cream Apply to affected area once daily as needed for dry skin 4/25/2018: prn

## 2021-05-29 NOTE — PROGRESS NOTES
"Assessment / Impression     1. Bilateral impacted cerumen     2. Vitamin B12 deficiency     3. Trisomy 21 (Down Syndrome)         Plan:     I removed the impacted cerumen from his ear canals myself using the lighted spatula. He tolerated the procedure well.  I recommended he follow-up in 1 month to reevaluate this.  He was given his vitamin B12 injection today.  He will be following up with audiology as scheduled.    Subjective:      HPI: Christian Bobby is a 58 y.o. male with a history of Down's syndrome who presents to the clinic for follow-up.  He has a history of cerumen impaction and needs to have the cerumen removed every 1-2 months.  A staff person from his group home is present and reports that the wax buildup seems worse over the past few weeks.  He wears hearing aids in both ears and has regular audiology appointments every 3 months.  He is not complaining of pain or drainage in his ears.  His energy level has been good.  He also has a history of vitamin B12 deficiency and is due for his next injection.      Review of Systems  All other systems reviewed and are negative.     Social History     Tobacco Use   Smoking Status Never Smoker   Smokeless Tobacco Never Used       No family history on file.    Objective:     /64 (Patient Site: Right Arm, Patient Position: Sitting, Cuff Size: Adult Large)   Pulse (!) 58   Temp 98.6  F (37  C) (Oral)   Resp 16   Ht 5' 5.5\" (1.664 m)   Wt (!) 258 lb 12.8 oz (117.4 kg)   BMI 42.41 kg/m    Physical Examination: General appearance - alert, well appearing, and in no distress  Eyes:  extraocular eye movements intact  Ears: Bilateral canals are impacted with cerumen.  Once this was removed the tympanic membranes appeared clear.  Neck: No lymphadenopathy  Heart: Regular rate and rhythm  Lungs: Clear to auscultation bilaterally.      Recent Results (from the past 168 hour(s))   Occult Blood(ICT)   Result Value Ref Range    Fecal Occult Bld (ICT) 1 Negative Negative "    Fecal Occult Blood (ICT) 2 Negative Negative       Current Outpatient Medications   Medication Sig Note     allopurinol (ZYLOPRIM) 100 MG tablet TAKE 1 TABLET BY MOUTH ONCE DAILY.      artificial tears,hypromellose, (GENTEAL; SYSTANE) 0.3 % Gel Administer 1 drop to both eyes 2 (two) times a day.      disposable gloves Misc Extra large and large non latex gloves for medication application, use by staff applying eye gtts given 8 times a day and eye ointment BID x 10 days.  #20 gloves daily.  #100 extra-large and #100 large.      erythromycin ophthalmic ointment apply (1CM)  by ophthalmic route 1 time every day ribbon into the lower conjunctival sac(s) in the affected eye(s) 2/3/2016: Received from: [facility]     latanoprost (XALATAN) 0.005 % ophthalmic solution Administer 1 drop to the right eye bedtime.      levothyroxine (SYNTHROID, LEVOTHROID) 75 MCG tablet TAKE 1 TABLET BY MOUTH ONCE DAILY      saliva stimulant (BIOTENE ORALBALANCE) Gel gel by Mucous Membrane route as needed.      timolol (BETIMOL) 0.5 % ophthalmic solution Administer 1 drop to the right eye daily.      triamcinolone (KENALOG) 0.1 % cream Apply to affected area once daily as needed for dry skin 4/25/2018: prn

## 2021-05-30 ENCOUNTER — RECORDS - HEALTHEAST (OUTPATIENT)
Dept: ADMINISTRATIVE | Facility: CLINIC | Age: 61
End: 2021-05-30

## 2021-05-30 VITALS — HEIGHT: 66 IN | BODY MASS INDEX: 44.69 KG/M2 | WEIGHT: 278.06 LBS

## 2021-05-30 VITALS — WEIGHT: 279 LBS | BODY MASS INDEX: 45.03 KG/M2

## 2021-05-30 VITALS — WEIGHT: 283 LBS | BODY MASS INDEX: 45.48 KG/M2 | HEIGHT: 66 IN

## 2021-05-30 NOTE — PROGRESS NOTES
"Assessment / Impression     1. Bilateral impacted cerumen     2. Vitamin B12 deficiency     3. Trisomy 21 (Down Syndrome)     4. Incontinence of feces, unspecified fecal incontinence type     5. Urinary incontinence, unspecified type         Plan:     I removed the impacted cerumen from his ear canals myself using the lighted spatula. He tolerated the procedure well.  I recommended he follow-up in 1 month to reevaluate this.  He was given his vitamin B12 injection today.  He will be following up with audiology as scheduled.  They are planning to contact [facility] to send request for more gloves and adult diapers that he needs for incontinence issues.    Subjective:      HPI: Christian Bobby is a 58 y.o. male with a history of Down's syndrome who presents to the clinic for follow-up.  He has a history of cerumen impaction and needs to have the cerumen removed every 1-2 months.  A staff person from his group home is present and reports that the wax buildup seems worse over the past few weeks.  He wears hearing aids in both ears and has regular audiology appointments every 3 months.  He is not complaining of pain or drainage in his ears.  His energy level has been good.  He also has a history of vitamin B12 deficiency and is due for his next injection.      Review of Systems  All other systems reviewed and are negative.     Social History     Tobacco Use   Smoking Status Never Smoker   Smokeless Tobacco Never Used       No family history on file.    Objective:     /62 (Patient Site: Right Arm, Patient Position: Sitting, Cuff Size: Adult Large)   Pulse 68   Temp 98.4  F (36.9  C) (Oral)   Resp 16   Ht 5' 5.5\" (1.664 m)   Wt (!) 258 lb 6.4 oz (117.2 kg)   BMI 42.35 kg/m    Physical Examination: General appearance - alert, well appearing, and in no distress  Eyes:  extraocular eye movements intact  Ears: Bilateral canals are impacted with cerumen.  Once this was removed the tympanic membranes appeared " clear.  Neck: No lymphadenopathy  Heart: Regular rate and rhythm  Lungs: Clear to auscultation bilaterally.      No results found for this or any previous visit (from the past 168 hour(s)).    Current Outpatient Medications   Medication Sig Note     allopurinol (ZYLOPRIM) 100 MG tablet TAKE 1 TABLET BY MOUTH ONCE DAILY.      artificial tears,hypromellose, (GENTEAL; SYSTANE) 0.3 % Gel Administer 1 drop to both eyes 2 (two) times a day.      disposable gloves Misc Extra large and large non latex gloves for medication application, use by staff applying eye gtts given 8 times a day and eye ointment BID x 10 days.  #20 gloves daily.  #100 extra-large and #100 large.      erythromycin ophthalmic ointment apply (1CM)  by ophthalmic route 1 time every day ribbon into the lower conjunctival sac(s) in the affected eye(s) 2/3/2016: Received from: [facility]     latanoprost (XALATAN) 0.005 % ophthalmic solution Administer 1 drop to the right eye bedtime.      levothyroxine (SYNTHROID, LEVOTHROID) 75 MCG tablet TAKE 1 TABLET BY MOUTH ONCE DAILY      saliva stimulant (BIOTENE ORALBALANCE) Gel gel by Mucous Membrane route as needed.      timolol (BETIMOL) 0.5 % ophthalmic solution Administer 1 drop to the right eye daily.      triamcinolone (KENALOG) 0.1 % cream Apply to affected area once daily as needed for dry skin 4/25/2018: prn

## 2021-05-30 NOTE — PROGRESS NOTES
"Assessment / Impression     1. Bilateral impacted cerumen     2. Vitamin B12 deficiency     3. Trisomy 21 (Down Syndrome)     4. Incontinence of feces, unspecified fecal incontinence type     5. Urinary incontinence, unspecified type         Plan:     I removed the impacted cerumen from his ear canals myself using the lighted spatula. He tolerated the procedure well.  I recommended he follow-up in 1 month to reevaluate this.  He was given his vitamin B12 injection today.  He will be following up with audiology as scheduled.  I hand wrote prescriptions for adult diapers 3XL, Chux pads and gloves.  These will be faxed to [facility].    Subjective:      HPI: Christian Bobby is a 58 y.o. male with a history of Down's syndrome who presents to the clinic for follow-up.  He has a history of cerumen impaction and needs to have the cerumen removed every 1-2 months.  A staff person from his group home is present and reports that the wax buildup seems worse over the past few weeks.  He wears hearing aids in both ears and has regular audiology appointments every 3 months.  He is not complaining of pain or drainage in his ears.  His energy level has been good.  He also has a history of vitamin B12 deficiency and is due for his next injection.  They are requesting to have adult diapers, Chux pads and gloves they may use for fecal and urinary incontinence issues that have become an increasing problem for Jhoan.    Review of Systems  All other systems reviewed and are negative.     Social History     Tobacco Use   Smoking Status Never Smoker   Smokeless Tobacco Never Used       No family history on file.    Objective:     /70 (Patient Site: Left Arm, Patient Position: Sitting, Cuff Size: Adult Large)   Pulse 64   Temp 99  F (37.2  C) (Oral)   Resp 16   Ht 5' 5.5\" (1.664 m)   Wt (!) 259 lb (117.5 kg)   BMI 42.44 kg/m    Physical Examination: General appearance - alert, well appearing, and in no distress  Eyes:  extraocular eye " movements intact  Ears: Bilateral canals are impacted with cerumen.  Once this was removed the tympanic membranes appeared clear.  Neck: No lymphadenopathy  Heart: Regular rate and rhythm  Lungs: Clear to auscultation bilaterally.  Abdomen: Soft, nontender.    No results found for this or any previous visit (from the past 168 hour(s)).    Current Outpatient Medications   Medication Sig Note     allopurinol (ZYLOPRIM) 100 MG tablet TAKE 1 TABLET BY MOUTH ONCE DAILY.      artificial tears,hypromellose, (GENTEAL; SYSTANE) 0.3 % Gel Administer 1 drop to both eyes 2 (two) times a day.      disposable gloves Misc Extra large and large non latex gloves for medication application, use by staff applying eye gtts given 8 times a day and eye ointment BID x 10 days.  #20 gloves daily.  #100 extra-large and #100 large.      erythromycin ophthalmic ointment apply (1CM)  by ophthalmic route 1 time every day ribbon into the lower conjunctival sac(s) in the affected eye(s) 2/3/2016: Received from: [facility]     latanoprost (XALATAN) 0.005 % ophthalmic solution Administer 1 drop to the right eye bedtime.      levothyroxine (SYNTHROID, LEVOTHROID) 75 MCG tablet TAKE 1 TABLET BY MOUTH ONCE DAILY      saliva stimulant (BIOTENE ORALBALANCE) Gel gel by Mucous Membrane route as needed.      timolol (BETIMOL) 0.5 % ophthalmic solution Administer 1 drop to the right eye daily.      triamcinolone (KENALOG) 0.1 % cream Apply to affected area once daily as needed for dry skin 4/25/2018: prn

## 2021-05-31 VITALS — WEIGHT: 275.44 LBS | BODY MASS INDEX: 44.27 KG/M2 | HEIGHT: 66 IN

## 2021-05-31 VITALS — WEIGHT: 272 LBS | HEIGHT: 66 IN | BODY MASS INDEX: 43.71 KG/M2

## 2021-05-31 VITALS — HEIGHT: 66 IN | WEIGHT: 275.4 LBS | BODY MASS INDEX: 44.26 KG/M2

## 2021-05-31 VITALS — WEIGHT: 272.19 LBS | HEIGHT: 66 IN | BODY MASS INDEX: 43.74 KG/M2

## 2021-05-31 VITALS — WEIGHT: 275.31 LBS | HEIGHT: 66 IN | BODY MASS INDEX: 44.25 KG/M2

## 2021-05-31 VITALS — HEIGHT: 65 IN | WEIGHT: 271.25 LBS | BODY MASS INDEX: 45.19 KG/M2

## 2021-05-31 VITALS — WEIGHT: 269.13 LBS | BODY MASS INDEX: 44.84 KG/M2 | HEIGHT: 65 IN

## 2021-05-31 VITALS — WEIGHT: 274.19 LBS | BODY MASS INDEX: 44.07 KG/M2 | HEIGHT: 66 IN

## 2021-05-31 NOTE — PROGRESS NOTES
"Assessment / Impression     1. Bilateral impacted cerumen     2. Vitamin B12 deficiency     3. Trisomy 21 (Down Syndrome)         Plan:     I removed the impacted cerumen from his ear canals myself using the lighted spatula. He tolerated the procedure well.  I recommended he follow-up in 1 month to reevaluate this.  He was given his vitamin B12 injection today.  He will be following up with audiology as scheduled.      Subjective:      HPI: Christian Bobby is a 58 y.o. male with a history of Down's syndrome who presents to the clinic for follow-up.  He has a history of cerumen impaction and needs to have the cerumen removed every 1-2 months.  A staff person from his group home is present and reports that the wax buildup seems worse over the past few weeks.  He wears hearing aids in both ears and has regular audiology appointments every 3 months.  He is not complaining of pain or drainage in his ears.  His energy level has been good.  He also has a history of vitamin B12 deficiency and is due for his next injection.    Review of Systems  All other systems reviewed and are negative.     Social History     Tobacco Use   Smoking Status Never Smoker   Smokeless Tobacco Never Used       No family history on file.    Objective:     /64 (Patient Site: Left Arm, Patient Position: Sitting, Cuff Size: Adult Large)   Pulse 64   Temp 98.7  F (37.1  C) (Oral)   Resp 16   Ht 5' 5.5\" (1.664 m)   Wt (!) 257 lb (116.6 kg)   BMI 42.12 kg/m    Physical Examination: General appearance - alert, well appearing, and in no distress  Eyes:  extraocular eye movements intact  Ears: Bilateral canals are impacted with cerumen.  Once this was removed the tympanic membranes appeared clear.  Neck: No lymphadenopathy  Heart: Regular rate and rhythm  Lungs: Clear to auscultation bilaterally.  Abdomen: Soft, nontender.    No results found for this or any previous visit (from the past 168 hour(s)).    Current Outpatient Medications "   Medication Sig Note     allopurinol (ZYLOPRIM) 100 MG tablet TAKE 1 TABLET BY MOUTH ONCE DAILY.      artificial tears,hypromellose, (GENTEAL; SYSTANE) 0.3 % Gel Administer 1 drop to both eyes 2 (two) times a day.      disposable gloves Misc Extra large and large non latex gloves for medication application, use by staff applying eye gtts given 8 times a day and eye ointment BID x 10 days.  #20 gloves daily.  #100 extra-large and #100 large.      erythromycin ophthalmic ointment apply (1CM)  by ophthalmic route 1 time every day ribbon into the lower conjunctival sac(s) in the affected eye(s) 2/3/2016: Received from: [facility]     latanoprost (XALATAN) 0.005 % ophthalmic solution Administer 1 drop to the right eye bedtime.      levothyroxine (SYNTHROID, LEVOTHROID) 75 MCG tablet TAKE 1 TABLET BY MOUTH ONCE DAILY      saliva stimulant (BIOTENE ORALBALANCE) Gel gel by Mucous Membrane route as needed.      timolol (BETIMOL) 0.5 % ophthalmic solution Administer 1 drop to the right eye daily.      triamcinolone (KENALOG) 0.1 % cream Apply to affected area once daily as needed for dry skin 4/25/2018: prn

## 2021-06-01 VITALS — WEIGHT: 269.44 LBS | BODY MASS INDEX: 44.89 KG/M2 | HEIGHT: 65 IN

## 2021-06-01 VITALS — BODY MASS INDEX: 44.91 KG/M2 | HEIGHT: 65 IN | WEIGHT: 269.56 LBS

## 2021-06-01 VITALS — HEIGHT: 65 IN | BODY MASS INDEX: 45.17 KG/M2 | WEIGHT: 271.13 LBS

## 2021-06-01 VITALS — BODY MASS INDEX: 45.34 KG/M2 | HEIGHT: 65 IN | WEIGHT: 272.13 LBS

## 2021-06-01 VITALS — BODY MASS INDEX: 45.41 KG/M2 | WEIGHT: 272.56 LBS | HEIGHT: 65 IN

## 2021-06-01 VITALS — HEIGHT: 65 IN | BODY MASS INDEX: 44.38 KG/M2 | WEIGHT: 266.38 LBS

## 2021-06-01 VITALS — HEIGHT: 65 IN | BODY MASS INDEX: 43.99 KG/M2 | WEIGHT: 264 LBS

## 2021-06-01 VITALS — BODY MASS INDEX: 45.05 KG/M2 | HEIGHT: 65 IN | WEIGHT: 270.38 LBS

## 2021-06-01 NOTE — PROGRESS NOTES
"Assessment / Impression     1. Bilateral impacted cerumen     2. Vitamin B12 deficiency     3. Hearing difficulty of both ears  Ambulatory referral to Audiology   4. Trisomy 21 (Down Syndrome)     5. Seborrheic dermatitis  triamcinolone (KENALOG) 0.1 % cream   6. Need for immunization against influenza  Influenza,Seasonal,Quad,INJ =/>6months       Plan:     I removed the impacted cerumen from his ear canals myself using the lighted spatula. He tolerated the procedure well.  I recommended he follow-up in 1 month to reevaluate this.  He was given his vitamin B12 injection today.  He will be following up with audiology as scheduled.  He was given a new referral for this.  I refilled triamcinolone which she uses as needed for seborrheic dermatitis.  He was also given the flu shot today.    Subjective:      HPI: Christian Bobby is a 59 y.o. male with a history of Down's syndrome who presents to the clinic for follow-up.  He has a history of cerumen impaction and needs to have the cerumen removed every 1-2 months.  A staff person from his group home is present and reports that the wax buildup seems worse over the past few weeks.  He wears hearing aids in both ears and has regular audiology appointments every 3 months.  He is not complaining of pain or drainage in his ears.  His energy level has been good.  He also has a history of vitamin B12 deficiency and is due for his next injection.      Review of Systems  All other systems reviewed and are negative.     Social History     Tobacco Use   Smoking Status Never Smoker   Smokeless Tobacco Never Used       No family history on file.    Objective:     /72 (Patient Site: Left Arm, Patient Position: Sitting, Cuff Size: Adult Large)   Pulse 62   Temp 98.9  F (37.2  C) (Oral)   Resp 18   Ht 5' 5.5\" (1.664 m)   Wt (!) 260 lb 1.6 oz (118 kg)   BMI 42.62 kg/m    Physical Examination: General appearance - alert, well appearing, and in no distress  Eyes:  extraocular eye " movements intact  Ears: Bilateral canals are impacted with cerumen.  Once this was removed the tympanic membranes appeared clear.  Neck: No lymphadenopathy  Heart: Regular rate and rhythm  Lungs: Clear to auscultation bilaterally.  Abdomen: Soft, nontender.  Skin: There is mildly erythematous skin present around the scalp and eyebrows  No results found for this or any previous visit (from the past 168 hour(s)).    Current Outpatient Medications   Medication Sig Note     allopurinol (ZYLOPRIM) 100 MG tablet TAKE 1 TABLET BY MOUTH ONCE DAILY.      artificial tears,hypromellose, (GENTEAL; SYSTANE) 0.3 % Gel Administer 1 drop to both eyes 2 (two) times a day.      disposable gloves Misc Extra large and large non latex gloves for medication application, use by staff applying eye gtts given 8 times a day and eye ointment BID x 10 days.  #20 gloves daily.  #100 extra-large and #100 large.      erythromycin ophthalmic ointment apply (1CM)  by ophthalmic route 1 time every day ribbon into the lower conjunctival sac(s) in the affected eye(s) 2/3/2016: Received from: [facility]     latanoprost (XALATAN) 0.005 % ophthalmic solution Administer 1 drop to the right eye bedtime.      levothyroxine (SYNTHROID, LEVOTHROID) 75 MCG tablet TAKE 1 TABLET BY MOUTH ONCE DAILY      saliva stimulant (BIOTENE ORALBALANCE) Gel gel by Mucous Membrane route as needed.      timolol (BETIMOL) 0.5 % ophthalmic solution Administer 1 drop to the right eye daily.      triamcinolone (KENALOG) 0.1 % cream Apply to affected area once daily as needed for dry skin

## 2021-06-02 ENCOUNTER — RECORDS - HEALTHEAST (OUTPATIENT)
Dept: ADMINISTRATIVE | Facility: CLINIC | Age: 61
End: 2021-06-02

## 2021-06-02 VITALS — HEIGHT: 65 IN | BODY MASS INDEX: 44 KG/M2 | WEIGHT: 264.06 LBS

## 2021-06-02 VITALS — WEIGHT: 265 LBS | BODY MASS INDEX: 44.15 KG/M2 | HEIGHT: 65 IN

## 2021-06-02 VITALS — HEIGHT: 66 IN | WEIGHT: 258 LBS | BODY MASS INDEX: 41.46 KG/M2

## 2021-06-02 VITALS — WEIGHT: 258.13 LBS | HEIGHT: 66 IN | BODY MASS INDEX: 41.49 KG/M2

## 2021-06-02 VITALS — WEIGHT: 266 LBS | HEIGHT: 65 IN | BODY MASS INDEX: 44.32 KG/M2

## 2021-06-02 VITALS — WEIGHT: 256.5 LBS | HEIGHT: 65 IN | BODY MASS INDEX: 42.74 KG/M2

## 2021-06-02 VITALS — BODY MASS INDEX: 44.32 KG/M2 | HEIGHT: 65 IN | WEIGHT: 266 LBS

## 2021-06-02 NOTE — PROGRESS NOTES
"Assessment / Impression     1. Bilateral impacted cerumen     2. Vitamin B12 deficiency     3. Trisomy 21 (Down Syndrome)     4. Abrasion of left ear, initial encounter         Plan:     I removed the impacted cerumen from his ear canals myself using the lighted spatula. He tolerated the procedure well.  I recommended he follow-up in 1 month to reevaluate this.  He was given his vitamin B12 injection today.  He will be following up with audiology as scheduled.  They will continue to monitor the abrasion in the left outer ear.  I applied antibiotic ointment to this which they may continue over the next few days as this heals.  If symptoms become worse he may return to the clinic.    Subjective:      HPI: Christian Bobby is a 59 y.o. male with a history of Down's syndrome who presents to the clinic for follow-up.  He has a history of cerumen impaction and needs to have the cerumen removed every 1-2 months.  A staff person from his group home is present and reports that the wax buildup seems worse over the past few weeks.  He wears hearing aids in both ears and has regular audiology appointments every 3 months.  He is not complaining of pain or drainage in his ears.  His energy level has been good.  He also has a history of vitamin B12 deficiency and is due for his next injection.      Review of Systems  All other systems reviewed and are negative.     Social History     Tobacco Use   Smoking Status Never Smoker   Smokeless Tobacco Never Used       No family history on file.    Objective:     /66 (Patient Site: Left Arm, Patient Position: Sitting, Cuff Size: Adult Large)   Pulse 68   Temp 98.7  F (37.1  C) (Oral)   Resp 24   Ht 5' 5.4\" (1.661 m)   Wt (!) 258 lb 8 oz (117.3 kg)   BMI 42.49 kg/m    Physical Examination: General appearance - alert, well appearing, and in no distress  Eyes:  extraocular eye movements intact  Ears: Bilateral canals are impacted with cerumen.  Once this was removed the tympanic " membranes appeared clear.  There is an abrasion that is scabbed over in the left outer ear.  Neck: No lymphadenopathy  Heart: Regular rate and rhythm  Lungs: Clear to auscultation bilaterally.    No results found for this or any previous visit (from the past 168 hour(s)).    Current Outpatient Medications   Medication Sig Note     allopurinol (ZYLOPRIM) 100 MG tablet TAKE 1 TABLET BY MOUTH ONCE DAILY.      artificial tears,hypromellose, (GENTEAL; SYSTANE) 0.3 % Gel Administer 1 drop to both eyes 2 (two) times a day.      disposable gloves Misc Extra large and large non latex gloves for medication application, use by staff applying eye gtts given 8 times a day and eye ointment BID x 10 days.  #20 gloves daily.  #100 extra-large and #100 large.      erythromycin ophthalmic ointment apply (1CM)  by ophthalmic route 1 time every day ribbon into the lower conjunctival sac(s) in the affected eye(s) 2/3/2016: Received from: [facility]     latanoprost (XALATAN) 0.005 % ophthalmic solution Administer 1 drop to the right eye bedtime.      levothyroxine (SYNTHROID, LEVOTHROID) 75 MCG tablet TAKE 1 TABLET BY MOUTH ONCE DAILY      saliva stimulant (BIOTENE ORALBALANCE) Gel gel by Mucous Membrane route as needed.      timolol (BETIMOL) 0.5 % ophthalmic solution Administer 1 drop to the right eye daily.      triamcinolone (KENALOG) 0.1 % cream Apply to affected area once daily as needed for dry skin

## 2021-06-02 NOTE — TELEPHONE ENCOUNTER
Sarah from patient's dependable care facility calling - consent on file.  Requesting date of patient's last tetanus shot as she needs it to update his forms at the home where he lives.  Information given per patient's chart:    Tdap 8/26/2010 0.5 ml     Sumaya Duron RN  Triage Nurse Advisor

## 2021-06-03 VITALS
DIASTOLIC BLOOD PRESSURE: 66 MMHG | BODY MASS INDEX: 43.07 KG/M2 | HEART RATE: 68 BPM | RESPIRATION RATE: 24 BRPM | SYSTOLIC BLOOD PRESSURE: 112 MMHG | WEIGHT: 258.5 LBS | TEMPERATURE: 98.7 F | HEIGHT: 65 IN

## 2021-06-03 VITALS — BODY MASS INDEX: 41.3 KG/M2 | HEIGHT: 66 IN | WEIGHT: 257 LBS

## 2021-06-03 VITALS
SYSTOLIC BLOOD PRESSURE: 104 MMHG | HEIGHT: 66 IN | DIASTOLIC BLOOD PRESSURE: 72 MMHG | BODY MASS INDEX: 41.8 KG/M2 | HEART RATE: 62 BPM | WEIGHT: 260.1 LBS | TEMPERATURE: 98.9 F | RESPIRATION RATE: 18 BRPM

## 2021-06-03 VITALS — BODY MASS INDEX: 41.53 KG/M2 | WEIGHT: 258.4 LBS | HEIGHT: 66 IN

## 2021-06-03 VITALS — WEIGHT: 258.8 LBS | HEIGHT: 66 IN | BODY MASS INDEX: 41.59 KG/M2

## 2021-06-03 VITALS — HEIGHT: 66 IN | BODY MASS INDEX: 41.62 KG/M2 | WEIGHT: 259 LBS

## 2021-06-03 VITALS — HEIGHT: 66 IN | WEIGHT: 257.19 LBS | BODY MASS INDEX: 41.33 KG/M2

## 2021-06-03 NOTE — PROGRESS NOTES
"Assessment / Impression     1. Bilateral impacted cerumen     2. Vitamin B12 deficiency     3. Trisomy 21 (Down Syndrome)     4. DNR (do not resuscitate)         Plan:     I removed the impacted cerumen from his ear canals myself using the lighted spatula. He tolerated the procedure well.  The clinical assistant use saline irrigation to flush out the remaining cerumen in the left ear canal.  I recommended he follow-up in 1 month to reevaluate this.  He was given his vitamin B12 injection today.  He will be following up with audiology as scheduled.  I recommended he see audiology monthly to have his hearing aids cleaned due to the significant cerumen buildup that develops.  We also discussed his DNR status and they are planning to bring the paperwork they have on this issue to his next appointment.      Subjective:      HPI: Christian Bobby is a 59 y.o. male with a history of Down's syndrome who presents to the clinic for follow-up.  He has a history of cerumen impaction and needs to have the cerumen removed every 1-2 months.  A staff person from his group home is present and reports that the wax buildup seems worse over the past few weeks.  He wears hearing aids in both ears and has regular audiology appointments every 3 months.  He is not complaining of pain or drainage in his ears.  His energy level has been good.  He also has a history of vitamin B12 deficiency and is due for his next injection.      Review of Systems  All other systems reviewed and are negative.     Social History     Tobacco Use   Smoking Status Never Smoker   Smokeless Tobacco Never Used       No family history on file.    Objective:     /72 (Patient Site: Left Arm, Patient Position: Sitting, Cuff Size: Adult Large)   Pulse 60   Temp 98  F (36.7  C) (Oral)   Resp 16   Ht 5' 5.4\" (1.661 m)   Wt (!) 255 lb 6 oz (115.8 kg)   BMI 41.98 kg/m    Physical Examination: General appearance - alert, well appearing, and in no distress  Eyes:  " extraocular eye movements intact  Ears: Bilateral canals are impacted with cerumen.  Once this was removed the tympanic membranes appeared clear.  There is an abrasion that is scabbed over in the left outer ear.  Neck: No lymphadenopathy  Heart: Regular rate and rhythm  Lungs: Clear to auscultation bilaterally.    No results found for this or any previous visit (from the past 168 hour(s)).    Current Outpatient Medications   Medication Sig Note     allopurinol (ZYLOPRIM) 100 MG tablet TAKE 1 TABLET BY MOUTH ONCE DAILY.      artificial tears,hypromellose, (GENTEAL; SYSTANE) 0.3 % Gel Administer 1 drop to both eyes 2 (two) times a day.      disposable gloves Misc Extra large and large non latex gloves for medication application, use by staff applying eye gtts given 8 times a day and eye ointment BID x 10 days.  #20 gloves daily.  #100 extra-large and #100 large.      erythromycin ophthalmic ointment apply (1CM)  by ophthalmic route 1 time every day ribbon into the lower conjunctival sac(s) in the affected eye(s) 2/3/2016: Received from: [facility]     latanoprost (XALATAN) 0.005 % ophthalmic solution Administer 1 drop to the right eye bedtime.      levothyroxine (SYNTHROID, LEVOTHROID) 75 MCG tablet TAKE 1 TABLET BY MOUTH ONCE DAILY      saliva stimulant (BIOTENE ORALBALANCE) Gel gel by Mucous Membrane route as needed.      timolol (BETIMOL) 0.5 % ophthalmic solution Administer 1 drop to the right eye daily.      triamcinolone (KENALOG) 0.1 % cream Apply to affected area once daily as needed for dry skin

## 2021-06-04 VITALS
HEIGHT: 65 IN | RESPIRATION RATE: 16 BRPM | WEIGHT: 254.38 LBS | DIASTOLIC BLOOD PRESSURE: 64 MMHG | SYSTOLIC BLOOD PRESSURE: 100 MMHG | HEART RATE: 60 BPM | BODY MASS INDEX: 42.38 KG/M2

## 2021-06-04 VITALS
SYSTOLIC BLOOD PRESSURE: 116 MMHG | WEIGHT: 255.38 LBS | HEART RATE: 60 BPM | DIASTOLIC BLOOD PRESSURE: 72 MMHG | HEIGHT: 65 IN | TEMPERATURE: 98 F | BODY MASS INDEX: 42.55 KG/M2 | RESPIRATION RATE: 16 BRPM

## 2021-06-04 VITALS
DIASTOLIC BLOOD PRESSURE: 62 MMHG | SYSTOLIC BLOOD PRESSURE: 102 MMHG | BODY MASS INDEX: 42.23 KG/M2 | HEIGHT: 65 IN | RESPIRATION RATE: 16 BRPM | HEART RATE: 68 BPM | WEIGHT: 253.44 LBS

## 2021-06-04 VITALS
DIASTOLIC BLOOD PRESSURE: 68 MMHG | SYSTOLIC BLOOD PRESSURE: 116 MMHG | HEART RATE: 61 BPM | HEIGHT: 65 IN | OXYGEN SATURATION: 98 % | BODY MASS INDEX: 43.32 KG/M2 | WEIGHT: 260 LBS

## 2021-06-04 VITALS
WEIGHT: 254.44 LBS | HEIGHT: 65 IN | DIASTOLIC BLOOD PRESSURE: 66 MMHG | TEMPERATURE: 98.5 F | SYSTOLIC BLOOD PRESSURE: 106 MMHG | HEART RATE: 60 BPM | RESPIRATION RATE: 16 BRPM | BODY MASS INDEX: 42.39 KG/M2

## 2021-06-04 VITALS
HEART RATE: 64 BPM | DIASTOLIC BLOOD PRESSURE: 64 MMHG | BODY MASS INDEX: 43.02 KG/M2 | SYSTOLIC BLOOD PRESSURE: 100 MMHG | HEIGHT: 65 IN | WEIGHT: 258.2 LBS | RESPIRATION RATE: 16 BRPM

## 2021-06-04 NOTE — PROGRESS NOTES
Assessment and Plan:     1. Encounter for general adult medical examination with abnormal findings  I encouraged him in the group home to work on eating healthy foods and getting regular physical activity.  We are going to check several labs today as listed below.    2. Trisomy 21 (Down Syndrome)  Stable    3. Dementia without behavioral disturbance, unspecified dementia type (H)  This issue has been stable.  He is currently living in a group home.    4. Morbid obesity (H)  He will work on eating healthy foods and getting regular physical activity.    5. Bilateral impacted cerumen  I personally removed a large amount of cerumen from both ears.  He tolerated this procedure well.  He is going to return to the clinic in 1 month for follow-up.    6. Vitamin B12 deficiency  He was given his next vitamin B12 injection.  We will be checking this lab today.  - Vitamin B12    7. Strep throat  His strep test came back positive.  He was given a prescription for amoxicillin.  If symptoms do not improve he will return to the clinic.  - Rapid Strep A Screen-Throat  - amoxicillin (AMOXIL) 500 MG tablet; Take 1 tablet (500 mg total) by mouth 2 (two) times a day.  Dispense: 20 tablet; Refill: 0    8. Hypothyroidism, unspecified type  He will continue levothyroxine 75 mcg daily.  We are going to check a thyroid cascade today.  - Thyroid [place]    9. Gout  Continue allopurinol 100 mg daily.  We are going to check uric acid level today.  - Uric Acid    10. Primary osteoarthritis of both knees  He may continue to take Tylenol as needed.  He may follow-up with myself or orthopedics as needed if symptoms become acutely worse.    11. Obstructive sleep apnea syndrome  Continue BiPAP.    12. Screening cholesterol level  - Lipid Cascade    13. Prediabetes  - Basic Metabolic Panel  - Glycosylated Hemoglobin A1c  - HM2(CBC w/o Differential)    14. Screening for prostate cancer  - PSA (Prostatic-Specific Antigen), Annual Screen     The  patient's current medical problems were reviewed.    The following high BMI interventions were performed this visit: encouragement to exercise and lifestyle education regarding diet  The following health maintenance schedule was reviewed with the patient and provided in printed form in the after visit summary:   Health Maintenance   Topic Date Due     HEPATITIS C SCREENING  1960     HIV SCREENING  09/04/1977     ZOSTER VACCINES (2 of 3) 01/31/2017     MEDICARE ANNUAL WELLNESS VISIT  12/19/2019     FECAL OCCULT BLOOD/FIT ANNUAL COLON CANCER SCREENING  05/24/2020     TDAP ADULT ONE TIME DOSE  08/26/2020     LIPID  12/11/2022     ADVANCE CARE PLANNING  10/08/2024     INFLUENZA VACCINE RULE BASED  Completed     TD 18+ HE  Discontinued        Subjective:   Chief Complaint: Christian Bobby is an 59 y.o. male here for an Annual Wellness visit.   HPI:   He has a medical history significant for Down syndrome, hypothyroidism, gout, obstructive sleep apnea on BiPAP, bilateral knee osteoarthritis, corneal disorder, obesity, recurrent bilateral cerumen impaction, vitamin B12 deficiency, hearing loss, dry skin and a history of prediabetes.  He lives in a group home.  One of the staff members is present and helps provide some of the history.  They report that he has been doing well.  On 12/19/18 he had a normal vitamin B12 level, normal TSH, PSA, CMP and cholesterol panel.  His hemoglobin A1c was 5.6%. He is urinating and having normal bowel movements.  He tolerates BiPAP well.  They report that his ears seem plugged up with cerumen again.  He typically has these cleaned out every month when he comes in for the B12 injections.  He describes having sore throat symptoms that recently started.  He is not having significant congestion or coughing symptoms.  He is not having fevers.    Review of Systems:   Please see above.  The rest of the review of systems are negative for all systems.    Patient Care Team:  Clifford Wooten  DO Marvin as PCP - General (Family Medicine)  Clifford Wooten DO as Assigned PCP     Patient Active Problem List   Diagnosis     Moderate Intellectual Disabilities     Cataract     Osteoarthritis of knees, bilateral     Trisomy 21 (Down Syndrome)     Hypothyroidism     Gout     Morbid obesity (H)     Prediabetes     Thyroglossal Cyst     Dementia (H)     Vitamin B12 deficiency     Narcolepsy without cataplexy     Sleep Apnea     Tinea Pedis     Cerumen Impaction In Both Ears     Dry skin     Corneal disorder     Onychomycosis     Callus of foot     Hearing difficulty of both ears     Seborrheic dermatitis     Fecal incontinence     Urinary incontinence     DNR (do not resuscitate)     No past medical history on file.   Past Surgical History:   Procedure Laterality Date     WA EXCIS THYROID DUCT CYST/SINUS      Description: Thyroglossal Duct Cyst Excision;  Recorded: 05/01/2012;      No family history on file.   Social History     Socioeconomic History     Marital status: Single     Spouse name: Not on file     Number of children: Not on file     Years of education: Not on file     Highest education level: Not on file   Occupational History     Not on file   Social Needs     Financial resource strain: Not on file     Food insecurity:     Worry: Not on file     Inability: Not on file     Transportation needs:     Medical: Not on file     Non-medical: Not on file   Tobacco Use     Smoking status: Never Smoker     Smokeless tobacco: Never Used   Substance and Sexual Activity     Alcohol use: No     Drug use: No     Sexual activity: Not on file   Lifestyle     Physical activity:     Days per week: Not on file     Minutes per session: Not on file     Stress: Not on file   Relationships     Social connections:     Talks on phone: Not on file     Gets together: Not on file     Attends Anabaptism service: Not on file     Active member of club or organization: Not on file     Attends meetings of clubs or  organizations: Not on file     Relationship status: Not on file     Intimate partner violence:     Fear of current or ex partner: Not on file     Emotionally abused: Not on file     Physically abused: Not on file     Forced sexual activity: Not on file   Other Topics Concern     Not on file   Social History Narrative     Not on file      Current Outpatient Medications   Medication Sig Dispense Refill     allopurinol (ZYLOPRIM) 100 MG tablet TAKE 1 TABLET BY MOUTH ONCE DAILY 30 tablet 10     artificial tears,hypromellose, (GENTEAL; SYSTANE) 0.3 % Gel Administer 1 drop to both eyes 2 (two) times a day. 10 g 11     disposable gloves Misc Extra large and large non latex gloves for medication application, use by staff applying eye gtts given 8 times a day and eye ointment BID x 10 days.  #20 gloves daily.  #100 extra-large and #100 large. 400 each 0     erythromycin ophthalmic ointment apply (1CM)  by ophthalmic route 1 time every day ribbon into the lower conjunctival sac(s) in the affected eye(s)       latanoprost (XALATAN) 0.005 % ophthalmic solution INSTILL 1 DROP INTO THE RIGHT EYE EVERY EVENING 2.5 mL 5     levothyroxine (SYNTHROID, LEVOTHROID) 75 MCG tablet TAKE 1 TABLET BY MOUTH ONCE DAILY 90 tablet 3     saliva stimulant (BIOTENE ORALBALANCE) Gel gel by Mucous Membrane route as needed.       timolol (BETIMOL) 0.5 % ophthalmic solution Administer 1 drop to the right eye daily. 5 mL 1     triamcinolone (KENALOG) 0.1 % cream Apply to affected area once daily as needed for dry skin 30 g 3     Current Facility-Administered Medications   Medication Dose Route Frequency Provider Last Rate Last Dose     cyanocobalamin injection 1,000 mcg  1,000 mcg Intramuscular Q30 Days Lexi Thibodeaux MD   1,000 mcg at 11/19/19 1604     cyanocobalamin injection 1,000 mcg  1,000 mcg Intramuscular Q30 Days Lexi Thibodeaux MD   1,000 mcg at 08/28/19 1612      Objective:   Vital Signs:   Visit Vitals  /64 (Patient  "Site: Left Arm, Patient Position: Sitting, Cuff Size: Adult Large)   Pulse 60   Resp 16   Ht 5' 5\" (1.651 m)   Wt (!) 254 lb 6 oz (115.4 kg)   BMI 42.33 kg/m         VisionScreening:  No exam data present     PHYSICAL EXAM  General Appearance: Alert, cooperative, no distress, appears stated age  Head: Normocephalic, without obvious abnormality, atraumatic  Eyes: PERRL, conjunctiva/corneas clear, EOM's intact  Ears: Both canals are impacted with cerumen.  The TM's and external ear canals appeared normal once the cerumen was removed.    Nose: Nares normal, septum midline,mucosa normal, no drainage  Throat: Lips, mucosa, and tongue normal; teeth and gums normal  Neck: Supple, symmetrical, trachea midline, no adenopathy;  thyroid: not enlarged, symmetric, no tenderness/mass/nodules  Back: Symmetric, no curvature, ROM normal, no CVA tenderness  Lungs: Clear to auscultation bilaterally, respirations unlabored  Heart: Regular rate and rhythm, S1 and S2 normal, no murmur, rub, or gallop,  Abdomen: Soft, non-tender, bowel sounds active all four quadrants,  no masses, no organomegaly  Musculoskeletal: Normal range of motion. No joint swelling or deformity.   Extremities: Tender to palpation over both knees, especially with flexion and extension maneuvers.  No obvious effusions.  Skin: Skin color, texture, turgor normal, no rashes or lesions  Lymph nodes: Cervical, supraclavicular, and axillary nodes normal  Neurologic: He is alert.  No focal deficits.  Normal deep tendon reflexes.   Psychiatric: He has a normal mood and affect.     Assessment Results 12/30/2019   Activities of Daily Living 1 - Full function   Instrumental Activities of Daily Living 5-6 - Severe functional impairment   Get Up and Go Score -   Mini Cog Total Score -   Some recent data might be hidden     A Mini-Cog score of 0-2 suggests the possibility of dementia, score of 3-5 suggests no dementia    Identified Health Risks:     He is at risk for lack of " exercise and has been provided with information to increase physical activity for the benefit of his well-being.  The patient was counseled and encouraged to consider modifying their diet and eating habits. He was provided with information on recommended healthy diet options.  The patient reports that he has difficulty with activities of daily living. I have asked that the patient make a follow up appointment in 4 weeks where this issue will be further evaluated and addressed.  The patient reports that he has difficulty with instrumental activities of daily living.  He was provided with a list of local organizations that provide support services and advised to make a follow up appointment in 4 weeks to address this further.   The patient was provided with written information regarding signs of hearing loss.  He is at risk for falling and has been provided with information to reduce the risk of falling at home.  Information regarding advance directives (living [place]), including where he can download the appropriate form, was provided to the patient via the AVS.       The patient was provided with appropriate referrals to address his memory problem.

## 2021-06-04 NOTE — TELEPHONE ENCOUNTER
RN cannot approve Refill Request    RN can NOT refill this medication med is not covered by policy/route to provider. Last office visit: 11/19/2019 Clifford Wooten DO Last Physical: 12/19/2018 Last MTM visit: Visit date not found Last visit same specialty: 11/19/2019 Clifford Wooten DO.  Next visit within 3 mo: Visit date not found  Next physical within 3 mo: Visit date not found      Celine Walter, [facility] Triage/Med Refill 12/9/2019    Requested Prescriptions   Pending Prescriptions Disp Refills     latanoprost (XALATAN) 0.005 % ophthalmic solution [Pharmacy Med Name: LATANOPROST SOL 0.005%] 2.5 mL 5     Sig: INSTILL 1 DROP INTO THE RIGHT EYE EVERY EVENING       There is no refill protocol information for this order

## 2021-06-04 NOTE — TELEPHONE ENCOUNTER
RN cannot approve Refill Request    RN can NOT refill this medication med is not covered by policy/route to provider. Last office visit: 11/19/2019 Clifford Wooten DO Last Physical: 12/19/2018 Last MTM visit: Visit date not found Last visit same specialty: 11/19/2019 Clifford Wooten DO.  Next visit within 3 mo: Visit date not found  Next physical within 3 mo: Visit date not found      Celine Walter, [facility] Triage/Med Refill 12/20/2019    Requested Prescriptions   Pending Prescriptions Disp Refills     allopurinol (ZYLOPRIM) 100 MG tablet [Pharmacy Med Name: ALLOPURINOL TAB 100MG] 30 tablet 10     Sig: TAKE 1 TABLET BY MOUTH ONCE DAILY       There is no refill protocol information for this order

## 2021-06-05 VITALS
BODY MASS INDEX: 43.83 KG/M2 | WEIGHT: 263.1 LBS | SYSTOLIC BLOOD PRESSURE: 104 MMHG | HEIGHT: 65 IN | HEART RATE: 68 BPM | RESPIRATION RATE: 16 BRPM | DIASTOLIC BLOOD PRESSURE: 62 MMHG

## 2021-06-05 VITALS
BODY MASS INDEX: 42.32 KG/M2 | HEART RATE: 64 BPM | HEIGHT: 65 IN | DIASTOLIC BLOOD PRESSURE: 56 MMHG | WEIGHT: 254 LBS | SYSTOLIC BLOOD PRESSURE: 86 MMHG

## 2021-06-05 VITALS
SYSTOLIC BLOOD PRESSURE: 96 MMHG | HEIGHT: 65 IN | HEART RATE: 60 BPM | WEIGHT: 247.9 LBS | DIASTOLIC BLOOD PRESSURE: 60 MMHG | BODY MASS INDEX: 41.3 KG/M2 | RESPIRATION RATE: 16 BRPM

## 2021-06-05 VITALS
HEART RATE: 64 BPM | WEIGHT: 262 LBS | DIASTOLIC BLOOD PRESSURE: 68 MMHG | BODY MASS INDEX: 43.6 KG/M2 | SYSTOLIC BLOOD PRESSURE: 124 MMHG | TEMPERATURE: 98 F | RESPIRATION RATE: 16 BRPM

## 2021-06-05 NOTE — PROGRESS NOTES
"Assessment / Impression     1. Bilateral impacted cerumen     2. Vitamin B12 deficiency     3. Trisomy 21 (Down Syndrome)         Plan:     I removed the impacted cerumen from his ear canals myself using the lighted spatula. He tolerated the procedure well.  I recommended he follow-up in 1 month to reevaluate this.  He was given his vitamin B12 injection today.  He will be following up with audiology as scheduled.      Subjective:      HPI: Christian Bobby is a 59 y.o. male with a history of Down's syndrome who presents to the clinic for follow-up.  He has a history of cerumen impaction and needs to have the cerumen removed every 1-2 months.  A staff person from his group home is present and reports that the wax buildup seems worse over the past few weeks.  He wears hearing aids in both ears and has regular audiology appointments every 3 months.  He is not complaining of pain or drainage in his ears.  His energy level has been good.  He also has a history of vitamin B12 deficiency and is due for his next injection.    Review of Systems  All other systems reviewed and are negative.     Social History     Tobacco Use   Smoking Status Never Smoker   Smokeless Tobacco Never Used       No family history on file.    Objective:     /66 (Patient Site: Left Arm, Patient Position: Sitting, Cuff Size: Adult Large)   Pulse 60   Temp 98.5  F (36.9  C) (Oral)   Resp 16   Ht 5' 5\" (1.651 m)   Wt (!) 254 lb 7 oz (115.4 kg)   BMI 42.34 kg/m    Physical Examination: General appearance - alert, well appearing, and in no distress  Eyes:  extraocular eye movements intact  Ears: Bilateral canals are impacted with cerumen.  Once this was removed the tympanic membranes appeared clear.  Neck: No lymphadenopathy  Heart: Regular rate and rhythm  Lungs: Clear to auscultation bilaterally.  Abdomen: Soft, nontender.    No results found for this or any previous visit (from the past 168 hour(s)).    Current Outpatient Medications "   Medication Sig Note     allopurinol (ZYLOPRIM) 100 MG tablet TAKE 1 TABLET BY MOUTH ONCE DAILY      amoxicillin (AMOXIL) 500 MG tablet Take 1 tablet (500 mg total) by mouth 2 (two) times a day.      artificial tears,hypromellose, (GENTEAL; SYSTANE) 0.3 % Gel Administer 1 drop to both eyes 2 (two) times a day.      disposable gloves Misc Extra large and large non latex gloves for medication application, use by staff applying eye gtts given 8 times a day and eye ointment BID x 10 days.  #20 gloves daily.  #100 extra-large and #100 large.      erythromycin ophthalmic ointment apply (1CM)  by ophthalmic route 1 time every day ribbon into the lower conjunctival sac(s) in the affected eye(s) 2/3/2016: Received from: [facility]     latanoprost (XALATAN) 0.005 % ophthalmic solution INSTILL 1 DROP INTO THE RIGHT EYE EVERY EVENING      levothyroxine (SYNTHROID, LEVOTHROID) 75 MCG tablet TAKE 1 TABLET BY MOUTH ONCE DAILY      saliva stimulant (BIOTENE ORALBALANCE) Gel gel by Mucous Membrane route as needed.      timolol (BETIMOL) 0.5 % ophthalmic solution Administer 1 drop to the right eye daily.      triamcinolone (KENALOG) 0.1 % cream Apply to affected area once daily as needed for dry skin

## 2021-06-06 NOTE — TELEPHONE ENCOUNTER
Refill Approved    Rx renewed per Medication Renewal Policy. Medication was last renewed on 2/20/19.    Last OV: 1/22/2020    Kimberly Dumont, [facility] Connection Triage/Med Refill 2/22/2020     Requested Prescriptions   Pending Prescriptions Disp Refills     levothyroxine (SYNTHROID, LEVOTHROID) 75 MCG tablet [Pharmacy Med Name: Levothyroxine Sodium 75 MCG Tablet] 29 tablet 10     Sig: TAKE 1 TABLET BY MOUTH ONCE DAILY       Thyroid Hormones Protocol Passed - 2/19/2020  9:50 AM        Passed - Provider visit in past 12 months or next 3 months     Last office visit with prescriber/PCP: 1/22/2020 Clifford Wooten DO OR same dept: 1/22/2020 Clifford Wooten DO OR same specialty: 1/22/2020 Clifford Wooten DO  Last physical: 12/30/2019 Last MTM visit: Visit date not found   Next visit within 3 mo: Visit date not found  Next physical within 3 mo: Visit date not found  Prescriber OR PCP: Clifford Padilla DO  Last diagnosis associated with med order: 1. Hypothyroidism  - levothyroxine (SYNTHROID, LEVOTHROID) 75 MCG tablet [Pharmacy Med Name: Levothyroxine Sodium 75 MCG Tablet]; TAKE 1 TABLET BY MOUTH ONCE DAILY  Dispense: 29 tablet; Refill: 10    If protocol passes may refill for 12 months if within 3 months of last provider visit (or a total of 15 months).             Passed - TSH on file in past 12 months for patient age 12 & older     TSH   Date Value Ref Range Status   12/30/2019 1.63 0.30 - 5.00 uIU/mL Final

## 2021-06-06 NOTE — TELEPHONE ENCOUNTER
Drug Change Request  Who is calling?:  [facility]  What is the current medication?:  Artificial Tears 1.4%  What alternative is being requested?: None  Why the request to change?:  Current medication is on back order  Requested Pharmacy?: [facility]  Okay to leave a detailed message?:  Yes        The pharmacy called on 2/24 to request a change in medication but the same medication was ordered.

## 2021-06-06 NOTE — TELEPHONE ENCOUNTER
Drug Change Request  Who is calling?:  Pharmacy fax  What is the current medication?:  Artificial tear sol 1.4%  What alternative is being requested?: no alternative suggested  Why the request to change?:  Current medication is on backorder  Requested Pharmacy?: [facility]  Okay to leave a detailed message?:  Yes

## 2021-06-06 NOTE — PROGRESS NOTES
"Assessment / Impression     1. Bilateral impacted cerumen     2. Vitamin B12 deficiency     3. Trisomy 21 (Down Syndrome)     4. Morbid obesity (H)     5. Primary osteoarthritis of both knees         Plan:     I removed the impacted cerumen from his ear canals myself using the lighted spatula. He tolerated the procedure well.  I recommended he follow-up in 1 month to reevaluate this.  He was given his vitamin B12 injection today.  He will be following up with audiology as scheduled.  I filled out the forms during the appointment today with the patient and staff member for handicap parking purposes.    Subjective:      HPI: Christian Bobby is a 59 y.o. male with a history of Down's syndrome who presents to the clinic for follow-up.  He has a history of cerumen impaction and needs to have the cerumen removed every 1-2 months.  A staff person from his group home is present and reports that the wax buildup seems worse over the past few weeks.  He wears hearing aids in both ears and has regular audiology appointments.  He recently received a new set of hearing aids.  He is not complaining of pain or drainage in his ears.  His energy level has been good.  He also has a history of vitamin B12 deficiency and is due for his next injection.  He also has a history of bilateral knee osteoarthritis.  He has seen a knee specialist about this in the past and has undergone injections.  They are requesting a handicap form be filled out for parking purposes.  Review of Systems  All other systems reviewed and are negative.     Social History     Tobacco Use   Smoking Status Never Smoker   Smokeless Tobacco Never Used       No family history on file.    Objective:     /62 (Patient Site: Right Arm, Patient Position: Sitting, Cuff Size: Adult Large)   Pulse 68   Resp 16   Ht 5' 5\" (1.651 m)   Wt (!) 253 lb 7 oz (115 kg)   BMI 42.17 kg/m    Physical Examination: General appearance - alert, well appearing, and in no " distress  Eyes:  extraocular eye movements intact  Ears: Bilateral canals are impacted with cerumen.  Once this was removed the tympanic membranes appeared clear.  Neck: No lymphadenopathy  Heart: Regular rate and rhythm  Lungs: Clear to auscultation bilaterally.  Abdomen: Soft, nontender.  Extremities: No effusions in the knees  No results found for this or any previous visit (from the past 168 hour(s)).    Current Outpatient Medications   Medication Sig Note     allopurinol (ZYLOPRIM) 100 MG tablet TAKE 1 TABLET BY MOUTH ONCE DAILY      artificial tears,hypromellose, (GENTEAL; SYSTANE) 0.3 % Gel Administer 1 drop to both eyes 2 (two) times a day.      disposable gloves Misc Extra large and large non latex gloves for medication application, use by staff applying eye gtts given 8 times a day and eye ointment BID x 10 days.  #20 gloves daily.  #100 extra-large and #100 large.      erythromycin ophthalmic ointment apply (1CM)  by ophthalmic route 1 time every day ribbon into the lower conjunctival sac(s) in the affected eye(s) 2/3/2016: Received from: [email or web address]     latanoprost (XALATAN) 0.005 % ophthalmic solution INSTILL 1 DROP INTO THE RIGHT EYE EVERY EVENING      levothyroxine (SYNTHROID, LEVOTHROID) 75 MCG tablet TAKE 1 TABLET BY MOUTH ONCE DAILY      polyvinyl alcohol (ARTIFICIAL TEARS, POLYVIN ALC,) 1.4 % ophthalmic solution 1 drop into both eyes 2 times daily.      saliva stimulant (BIOTENE ORALBALANCE) Gel gel by Mucous Membrane route as needed.      timolol (BETIMOL) 0.5 % ophthalmic solution Administer 1 drop to the right eye daily.      triamcinolone (KENALOG) 0.1 % cream Apply to affected area once daily as needed for dry skin      amoxicillin (AMOXIL) 500 MG tablet Take 1 tablet (500 mg total) by mouth 2 (two) times a day.

## 2021-06-07 NOTE — TELEPHONE ENCOUNTER
Requested Prescriptions     Pending Prescriptions Disp Refills     artificial tears,hypromellose, (GENTEAL; SYSTANE) 0.3 % Gel 10 g 11     Sig: Administer 1 drop to both eyes 2 (two) times a day.     Goniovisc opth not covered change to Systane ore Refresh per pharmacy fax.

## 2021-06-07 NOTE — TELEPHONE ENCOUNTER
Left message to call back for: Christian Bobby  Information to relay to patient:  Dr. Brink states it would be OK for Jhoan to wait at least 1-2 months for an appointment if he can.  We are trying to keep healthy patients out of the clinic.  Please check with Christian's staff when they call back. Thank you.

## 2021-06-07 NOTE — TELEPHONE ENCOUNTER
Who is calling:  Nurse at [facility]  Reason for Call:  Patient had a med error on Saturday March 21. Christian was given  had been given a double dose of the levothyroxine and allopurinol in error.  No changes in status noted.  FYI  Date of last appointment with primary care: Wagner to leave a detailed message: Yes [phone]

## 2021-06-08 NOTE — PROGRESS NOTES
Subjective findings: Mr. Bobby returned to the clinic today for continued foot care complaining of    long thick nails on both feet. Also complaining of a callus right foot.  OBJECTIVE FINDINGS: Elongated nails 1 through 5 both feet. All nails are 2    times normal thickness with the exception of left great toenail, which is 3    to 4 times normal thickness. Skin bilaterally is cool, dry, and scaly. There is a thick hyperkeratotic    lesion medial aspect first metatarsal phalangeal joint right foot. DP    and PT pulses +1/4 bilaterally. Capillary refill less than 2 seconds    bilaterally. Negative clonus. Negative Babinski bilaterally. Range of    motion within normal limits bilaterally. Muscle power +5/5 bilaterally    within all compartments.    ASSESSMENT: Onychomycosis onychauxis.   PLAN: Debrided dystrophic nails 1 through 5 both feet.I have recommended    that Mr. Bobby return to clinic every 2 to 3 months for continued foot care.

## 2021-06-08 NOTE — PROGRESS NOTES
Assessment/Plan:       1. Seborrheic dermatitis of scalp  Seborrheic dermatitis of the scalp is present.  I discussed that this is also known as cranial.  I prescribed ketoconazole shampoo to use 3-4 days per week for 8 weeks and then as needed for controlling the symptoms.  I discussed that this is a very common condition and can wax and wane through the seasons.  Advised continuing with dandruff shampoo on the other days of the week.  Advised to follow-up if symptoms do not improve.  - ketoconazole 1 % Sham; Apply 10 mL topically as needed.  Dispense: 1 Bottle; Refill: 1    2. Eczema  Some small dry patchy areas are present on the scalp.  Triamcinolone cream may improve these symptoms that are itching.  I discussed applying this only when he has pruritic lesions.  - triamcinolone (KENALOG) 0.1 % cream; Apply to affected area once daily as needed for dry skin  Dispense: 30 g; Refill: 0    3. Cerumen impaction  I removed impacted cerumen bilaterally with lighted speculum.  Given the chronic nature of this I advised the healthcare worker to bring him in every 2 months to have his ears cleaned out.  He tolerated the procedure well and is without complaint.        Subjective:      Christian Bobby is a 56 y.o. male who presents for occasional dry patches on scalp that have been itchy at times.  He comes in with one of his healthcare workers today to discuss these dry patches by the request of his mom.  He also needs to have his ears cleaned as he has chronic cerumen impaction secondary to not allowing staff clean his ears and wearing hearing aids on a daily basis.  The itchiness on his scalp does appear to be consistent with weather changes.  It is worse in the winter when it is more dry and doesn't seem to be present in the summer.  He does use on a daily basis shampoo to help control dandruff.  He takes a shower daily basis.  He has no other questions or concerns today and the staff member denies any other concerns.    "    The following portions of the patient's history were reviewed and updated as appropriate: allergies, current medications and problem list.    Review of Systems   Pertinent items are noted in HPI.      Objective:        Visit Vitals     /78 (Patient Site: Right Arm, Patient Position: Sitting, Cuff Size: Adult Regular)     Pulse 76     Resp 16     Ht 5' 6\" (1.676 m)     Wt (!) 283 lb (128.4 kg)     BMI 45.68 kg/m2       General appearance: alert, appears stated age, cooperative and syndromic appearance - Downs  Head: Normocephalic, without obvious abnormality, atraumatic, syndromic facies, Seborrheic dermatitis is present on the scalp  Ears: normal TM's and external ear canals both ears. After having ears cleaned bilaterally with lighted speculum to remove impacted cerumen.  Skin: Skin color, texture, turgor normal. No rashes or lesions or Except for positive seborrheic dermatitis on scalp  Neurologic: Grossly normal  "

## 2021-06-09 ENCOUNTER — RECORDS - HEALTHEAST (OUTPATIENT)
Dept: ADMINISTRATIVE | Facility: CLINIC | Age: 61
End: 2021-06-09

## 2021-06-09 NOTE — TELEPHONE ENCOUNTER
FAX from pharmacy stating SYSTANE EYE DROPS not covered, GENTEAL GEL not available.  Change to GENTEAL TEARS EYE DROPS?

## 2021-06-09 NOTE — PROGRESS NOTES
Assessment/Plan:        1. Bilateral impacted cerumen  - Ear cerumen removal   Lavaged and cleared   Follow up prn        Subjective:    Patient ID:   Christian Bobby is a 59 y.o. male comes in for ear evaluation of feeling plugged for the past few days.  He denies ear pain.   No other concerns.   Needs his Vit B12 injection today        Review of Systems  Allergy: reviewed  General : negative  A complete 5 point review of systems was obtained and is negative other than what is stated in the HPI.      The following patient's history were reviewed and updated as appropriate:   He  has no past medical history on file..      Outpatient Encounter Medications as of 7/8/2020   Medication Sig Dispense Refill     allopurinol (ZYLOPRIM) 100 MG tablet TAKE 1 TABLET BY MOUTH ONCE DAILY 30 tablet 10     artificial tears,hypromellose, (GENTEAL; SYSTANE) 0.3 % Gel Administer 1 drop to both eyes 2 (two) times a day. 10 g 11     disposable gloves Misc Extra large and large non latex gloves for medication application, use by staff applying eye gtts given 8 times a day and eye ointment BID x 10 days.  #20 gloves daily.  #100 extra-large and #100 large. 400 each 0     erythromycin ophthalmic ointment APPLY A SMALL AMOUNT IN BOTH EYES QAM.(5 MINUTES AFTER EYE DROPS) 7 g 11     latanoprost (XALATAN) 0.005 % ophthalmic solution INSTILL 1 DROP INTO THE RIGHT EYE EVERY EVENING 2.5 mL 5     levothyroxine (SYNTHROID, LEVOTHROID) 75 MCG tablet TAKE 1 TABLET BY MOUTH ONCE DAILY 30 tablet 11     polyvinyl alcohol (ARTIFICIAL TEARS, POLYVIN ALC,) 1.4 % ophthalmic solution 1 drop into both eyes 2 times daily. 15 mL 11     saliva stimulant (BIOTENE ORALBALANCE) Gel gel by Mucous Membrane route as needed.       timolol (BETIMOL) 0.5 % ophthalmic solution Administer 1 drop to the right eye daily. 5 mL 1     triamcinolone (KENALOG) 0.1 % cream Apply to affected area once daily as needed for dry skin 30 g 3     amoxicillin (AMOXIL) 500 MG tablet Take 1  "tablet (500 mg total) by mouth 2 (two) times a day. 20 tablet 0     artificial tears, hypromellose, (GONIOVISC) 2.5 % ophthalmic solution Administer 1 drop to both eyes 2 times daily. 15 mL 12     Facility-Administered Encounter Medications as of 7/8/2020   Medication Dose Route Frequency Provider Last Rate Last Dose     cyanocobalamin injection 1,000 mcg  1,000 mcg Intramuscular Q30 Days Lexi Thibodeaux MD   1,000 mcg at 02/26/20 1548     cyanocobalamin injection 1,000 mcg  1,000 mcg Intramuscular Q30 Days Lexi Thibodeaux MD   1,000 mcg at 07/08/20 1319     cyanocobalamin injection 1,000 mcg  1,000 mcg Intramuscular Q30 Days Clifford Wooten DO   1,000 mcg at 06/09/20 1541         Objective:   /68 (Patient Site: Right Arm, Patient Position: Sitting, Cuff Size: Adult Large)   Pulse 61   Ht 5' 5\" (1.651 m)   Wt (!) 260 lb (117.9 kg)   SpO2 98%   BMI 43.27 kg/m        Physical Exam  Gen: NAD, well hydrated   Ears: excess ear wax bilaterally . Lavaged and cleaned. Normal TM and canals bilaterally   "

## 2021-06-09 NOTE — PROGRESS NOTES
Subjective findings: Mr. Bobby returned to the clinic today for continued foot care complaining of    long thick nails on both feet. Also complaining of a callus right foot.  OBJECTIVE FINDINGS: Elongated nails 1 through 5 both feet. All nails are 2    times normal thickness with the exception of left great toenail, which is 3    to 4 times normal thickness. Skin bilaterally is cool, dry, and scaly. There is a thick hyperkeratotic    lesion medial aspect first metatarsal phalangeal joint right foot. DP    and PT pulses +1/4 bilaterally. Capillary refill less than 2 seconds    bilaterally. Negative clonus. Negative Babinski bilaterally. Range of    motion within normal limits bilaterally. Muscle power +5/5 bilaterally    within all compartments.    ASSESSMENT: Onychomycosis onychauxis.   PLAN: Trimmed dystrophic nails 1 through 5 both feet.I have recommended    that Mr. Bobby return to clinic every 2 to 3 months for continued foot care.

## 2021-06-09 NOTE — PROGRESS NOTES
Assessment / Impression     1. Bilateral impacted cerumen     2. Vitamin B12 Deficiency     3. Right knee pain     4. Seborrheic dermatitis     5. Trisomy 21 (Down Syndrome)           Plan:     I personally removed c a large amount of erumen from both ears using the lighted spatula.  He tolerated this well. he may return to clinic as needed for this in the future.  He was given his monthly vitamin B12 injection today.  We discontinued the ketoconazole that was prescribed in the past for his scalp.  They report that triamcinolone as needed seems to work better.  I recommended he continue naproxen and may apply ice as needed for his right-sided knee pain symptoms which are likely due to arthritis.  I reviewed the x-ray from 5/19/16 with Christian and his group home staff member.  If he continues to have pain in this knee we could consider doing a corticosteroid injection in the future.  I personally reviewed and interpreted the x-ray from 5/19/16 which did not comment about the right knee on the bilateral AP standing view.  I also personally reviewed the last office visit note from 1/27/17 which is usually lasts cleaned and the seborrheic dermatitis was discussed.  I also reviewed labs that he had as part of his physical exam this past December.    Subjective:      HPI: Christian Bobby is a 56 y.o. male who presents to the clinic for follow-up.  A group home staff members present and provides the history.  They have noticed worsening hearing over the past several days.  He has had issues with cerumen impaction in the past and they are wondering if he needs to have his ears cleaned out again.  He was last in to have wax removed on 1/27/17.  He is due for his next vitamin B12 injection.  They would like to have ketoconazole removed from his medication list since this does not seem to be helping the seborrheic dermatitis symptoms on his scalp is much evidence triamcinolone cream dose.  He is complaining of pain in his right  knee over the past few days.  This issue seems to get worse when the weather is colder.  Staff member states that he has a history of arthritis.  He takes naproxen for this which seems to help.  Had a standing bilateral AP x-ray of the knees on 5/19/16 which showed evidence of arthritis.        Review of Systems  All other systems reviewed and are negative.     History   Smoking Status     Never Smoker   Smokeless Tobacco     Not on file       No family history on file.    Objective:     Visit Vitals     /70 (Patient Site: Right Arm, Patient Position: Sitting, Cuff Size: Adult Large)     Pulse 60     Resp 16     Wt (!) 279 lb (126.6 kg)     BMI 45.03 kg/m2     Physical Examination: General appearance - alert, well appearing, and in no distress  Eyes: pupils equal and reactive, extraocular eye movements intact  Ears: Impacted with cerumen bilaterally.  Once this was removed the tympanic membranes appear clear.    Mouth: mucous membranes moist, pharynx normal without lesions  Neck: supple, no significant adenopathy  Lungs: clear to auscultation, no wheezes, rales or rhonchi, symmetric air entry  Heart: normal rate, regular rhythm, normal S1, S2, no murmurs, rubs, clicks or gallops  Abdomen: soft, nontender, nondistended, no masses or organomegaly  Neurological: alert, no focal findings or movement disorder noted.    Extremities: No effusions in the right knee.  There is mild tenderness in the medial joint space.  Callie's, varus, valgus, Lachman's testing negative  Skin: There is scaly skin on his scalp with some underlying erythema.    No results found for this or any previous visit (from the past 168 hour(s)).    Current Outpatient Prescriptions   Medication Sig Note     allopurinol (ZYLOPRIM) 100 MG tablet Take 100 mg by mouth daily.      disposable gloves Misc Extra large and large non latex gloves for medication application, use by staff applying eye gtts given 8 times a day and eye ointment BID x 10  days.  #20 gloves daily.  #100 extra-large and #100 large.      erythromycin ophthalmic ointment apply (1CM)  by ophthalmic route 1 time every day ribbon into the lower conjunctival sac(s) in the affected eye(s) 2/3/2016: Received from: [facility]     L.acidophilus-Bifido.longum 15 mg (1 billion cell) CpDR Take 1 capsule by mouth daily.      lactobacillus acidophilus (ACIDOPHILUS) cap Take as directed      latanoprost (XALATAN) 0.005 % ophthalmic solution Administer 1 drop to the right eye bedtime.      levothyroxine (SYNTHROID, LEVOTHROID) 75 MCG tablet Take 75 mcg by mouth daily.      naproxen (NAPROSYN) 500 MG tablet Take 1 tablet (500 mg total) by mouth as needed.      saliva stimulant (BIOTENE ORALBALANCE) Gel gel by Mucous Membrane route as needed.      triamcinolone (KENALOG) 0.1 % cream Apply to affected area once daily as needed for dry skin

## 2021-06-10 NOTE — PROGRESS NOTES
"Assessment / Impression     1. Bilateral impacted cerumen     2. Vitamin B12 deficiency     3. Trisomy 21 (Down Syndrome)     4. Dementia without behavioral disturbance, unspecified dementia type (H)     5. Need for tetanus booster         Plan:     I removed the impacted cerumen from his ear canals myself using the lighted spatula. He tolerated the procedure well.  I recommended he follow-up in 1 month to reevaluate this.  He was given his vitamin B12 injection today.  The group home will continue to monitor for worsening signs of dementia. He will be following up with audiology as scheduled.      Subjective:      HPI: Christian Bobby is a 59 y.o. male with a history of Down's syndrome who presents to the clinic for follow-up.  He has a history of cerumen impaction and needs to have the cerumen removed every 1-2 months.  A staff person from his group home is present and reports that the wax buildup seems worse over the past few weeks.  He wears hearing aids in both ears and has regular audiology appointments every 3 months.  He is not complaining of pain or drainage in his ears.  His energy level has been good.  He also has a history of vitamin B12 deficiency and is due for his next injection.    Review of Systems  All other systems reviewed and are negative.     Social History     Tobacco Use   Smoking Status Never Smoker   Smokeless Tobacco Never Used       No family history on file.    Objective:     /64 (Patient Site: Right Arm, Patient Position: Sitting, Cuff Size: Adult Regular)   Pulse 64   Resp 16   Ht 5' 5\" (1.651 m)   Wt (!) 258 lb 3.2 oz (117.1 kg)   BMI 42.97 kg/m    Physical Examination: General appearance - alert, well appearing, and in no distress  Eyes:  extraocular eye movements intact  Ears: Bilateral canals are impacted with cerumen.  Once this was removed the tympanic membranes appeared clear.  Neck: No lymphadenopathy  Heart: Regular rate and rhythm  Lungs: Clear to auscultation " bilaterally.  Abdomen: Soft, nontender.    No results found for this or any previous visit (from the past 168 hour(s)).    Current Outpatient Medications   Medication Sig     allopurinol (ZYLOPRIM) 100 MG tablet TAKE 1 TABLET BY MOUTH ONCE DAILY     artificial tears,hypromellose, (GENTEAL; SYSTANE) 0.3 % Gel Administer to both eyes 2 (two) times a day.     erythromycin ophthalmic ointment APPLY A SMALL AMOUNT IN BOTH EYES QAM.(5 MINUTES AFTER EYE DROPS)     latanoprost (XALATAN) 0.005 % ophthalmic solution INSTILL 1 DROP INTO THE RIGHT EYE EVERY EVENING     levothyroxine (SYNTHROID, LEVOTHROID) 75 MCG tablet TAKE 1 TABLET BY MOUTH ONCE DAILY     saliva stimulant (BIOTENE ORALBALANCE) Gel gel by Mucous Membrane route as needed.     timolol (BETIMOL) 0.5 % ophthalmic solution Administer 1 drop to the right eye daily.     triamcinolone (KENALOG) 0.1 % cream Apply to affected area once daily as needed for dry skin     artificial tears,hypromellose, (SYSTANE GEL) 0.3 % Gel Apply 1 drop both eyes twice daily     disposable gloves Misc Extra large and large non latex gloves for medication application, use by staff applying eye gtts given 8 times a day and eye ointment BID x 10 days.  #20 gloves daily.  #100 extra-large and #100 large.     polyvinyl alcohol (ARTIFICIAL TEARS, POLYVIN ALC,) 1.4 % ophthalmic solution 1 drop into both eyes 2 times daily.

## 2021-06-10 NOTE — PROGRESS NOTES
"Assessment / Impression     1. Bilateral impacted cerumen     2. Trisomy 21 (Down Syndrome)     3. Vitamin B12 Deficiency         Plan:     I removed a large amount of cerumen from his ear canals myself using the lighted spatula.  The CA used saline irrigation to get the remaining cerumen out.  He tolerated the procedure well.  I recommended he follow-up in 2 months to reevaluate this prior to his next cardiology appointment.  He was given his vitamin B12 injection today.      Subjective:      HPI: Christian Bobby is a 56 y.o. male who presents to the clinic to have his ears evaluated.  He has a history of cerumen impaction and needs to have the cerumen removed every fewmonths.  A staff person from his group home is present and reports that the wax buildup has become much worse over the past few weeks.  They have noticed diminished hearing because of this.  He wears hearing aids in both ears.  He has an audiology appointment later today and needs to have the wax removed prior to that visit.  He also has a history of vitamin B12 deficiency and is due for his next injection.        Review of Systems  All other systems reviewed and are negative.     History   Smoking Status     Never Smoker   Smokeless Tobacco     Not on file       No family history on file.    Objective:     /70 (Patient Site: Left Arm, Patient Position: Sitting, Cuff Size: Adult Large)  Pulse 60  Temp 98.5  F (36.9  C) (Oral)   Resp 16  Ht 5' 6\" (1.676 m)  Wt (!) 278 lb 1 oz (126.1 kg)  BMI 44.88 kg/m2  Physical Examination: General appearance - alert, well appearing, and in no distress  Eyes:  extraocular eye movements intact  Ears: Bilateral canals are impacted with cerumen.  Once this was removed the tympanic membranes appeared clear.  Neck: No lymphadenopathy  Heart: Regular rate and rhythm    No results found for this or any previous visit (from the past 168 hour(s)).    Current Outpatient Prescriptions   Medication Sig Note     " allopurinol (ZYLOPRIM) 100 MG tablet Take 100 mg by mouth daily.      disposable gloves Misc Extra large and large non latex gloves for medication application, use by staff applying eye gtts given 8 times a day and eye ointment BID x 10 days.  #20 gloves daily.  #100 extra-large and #100 large.      erythromycin ophthalmic ointment apply (1CM)  by ophthalmic route 1 time every day ribbon into the lower conjunctival sac(s) in the affected eye(s) 2/3/2016: Received from: [facility]     L.acidophilus-Bifido.longum 15 mg (1 billion cell) CpDR Take 1 capsule by mouth daily.      lactobacillus acidophilus (ACIDOPHILUS) cap Take as directed      latanoprost (XALATAN) 0.005 % ophthalmic solution Administer 1 drop to the right eye bedtime.      levothyroxine (SYNTHROID, LEVOTHROID) 75 MCG tablet Take 75 mcg by mouth daily.      naproxen (NAPROSYN) 500 MG tablet Take 1 tablet (500 mg total) by mouth as needed.      saliva stimulant (BIOTENE ORALBALANCE) Gel gel by Mucous Membrane route as needed.      triamcinolone (KENALOG) 0.1 % cream Apply to affected area once daily as needed for dry skin

## 2021-06-10 NOTE — PROGRESS NOTES
Assessment / Impression     1. Bilateral impacted cerumen     2. Right knee pain     3. Trisomy 21 (Down Syndrome)     4. Vitamin B12 Deficiency     5. Seborrheic dermatitis         Plan:     I removed a large amount of cerumen from his ear canals myself using the lighted spatula. He tolerated the procedure well.  I recommended he follow-up in 1 month to reevaluate this prior to his next audiology appointment.  He was given his vitamin B12 injection today.  I recommended that he avoid biting his finger.  They may monitor the hematoma for now.  He may stop using the triamcinolone cream since the seborrheic dermatitis symptoms have improved.  He was given a referral to see an orthopedic knee specialist for further evaluation of the knee pain which is likely due to arthritis.  He may benefit from having a corticosteroid injection.    Subjective:      HPI: Christian Bobby is a 56 y.o. male who presents to the clinic to have his ears evaluated.  He has a history of cerumen impaction and needs to have the cerumen removed every few months.  A staff person from his group home is present and reports that the wax buildup has become much worse over the past few weeks.  They have noticed diminished hearing because of this.  He wears hearing aids in both ears.  He has regular audiology appointments and needs to have the wax removed prior to those visits.  He also has a history of vitamin B12 deficiency and is due for his next injection.  Continues to struggle with knee pain, worse on the right.  He has a history of arthritis.  He has been taking naproxen in the mornings which seems to help.  They deny swelling.  He limps when the pain is severe.  He has been using triamcinolone cream for seborrheic dermatitis on his scalp.  The group home staff person reports that this issue has improved and that they are wondering if he still needs to use that cream.  He would like to have his right index finger evaluated.  He bites it on a  "regular basis and now has a black melissa.  It is not painful, red or swollen.        Review of Systems  All other systems reviewed and are negative.     History   Smoking Status     Never Smoker   Smokeless Tobacco     Not on file       No family history on file.    Objective:     /68 (Patient Site: Left Arm, Patient Position: Sitting, Cuff Size: Adult Regular)  Pulse 80  Temp 97.6  F (36.4  C) (Temporal)   Resp 24  Ht 5' 6\" (1.676 m)  Wt (!) 275 lb 6.4 oz (124.9 kg)  BMI 44.45 kg/m2  Physical Examination: General appearance - alert, well appearing, and in no distress  Eyes:  extraocular eye movements intact  Ears: Bilateral canals are impacted with cerumen.  Once this was removed the tympanic membranes appeared clear.  Neck: No lymphadenopathy  Heart: Regular rate and rhythm  Extremities: Knee flexion, extension and Callie's maneuvers cause pain in both knees.  There is a nontender hematoma present on the right index finger without surrounding erythema or swelling.  Lungs: Clear to auscultation bilaterally.  Skin: Scalp is clear.  No results found for this or any previous visit (from the past 168 hour(s)).    Current Outpatient Prescriptions   Medication Sig Note     allopurinol (ZYLOPRIM) 100 MG tablet Take 100 mg by mouth daily.      disposable gloves Misc Extra large and large non latex gloves for medication application, use by staff applying eye gtts given 8 times a day and eye ointment BID x 10 days.  #20 gloves daily.  #100 extra-large and #100 large.      erythromycin ophthalmic ointment apply (1CM)  by ophthalmic route 1 time every day ribbon into the lower conjunctival sac(s) in the affected eye(s) 2/3/2016: Received from: [facility]     L.acidophilus-Bifido.longum 15 mg (1 billion cell) CpDR Take 1 capsule by mouth daily.      lactobacillus acidophilus (ACIDOPHILUS) cap Take as directed      latanoprost (XALATAN) 0.005 % ophthalmic solution Administer 1 drop to the right eye bedtime.  "     levothyroxine (SYNTHROID, LEVOTHROID) 75 MCG tablet Take 75 mcg by mouth daily.      naproxen (NAPROSYN) 500 MG tablet Take 1 tablet (500 mg total) by mouth as needed.      saliva stimulant (BIOTENE ORALBALANCE) Gel gel by Mucous Membrane route as needed.      triamcinolone (KENALOG) 0.1 % cream Apply to affected area once daily as needed for dry skin

## 2021-06-11 NOTE — TELEPHONE ENCOUNTER
RN cannot approve Refill Request    RN can NOT refill this medication med is not covered by policy/route to provider. Last office visit: 9/10/2020 Clifford Wooten DO Last Physical: 12/30/2019 Last MTM visit: Visit date not found Last visit same specialty: 9/10/2020 Clifford Wooten DO.  Next visit within 3 mo: Visit date not found  Next physical within 3 mo: Visit date not found      Danuta Scott, [facility] Triage/Med Refill 9/18/2020    Requested Prescriptions   Pending Prescriptions Disp Refills     triamcinolone (KENALOG) 0.1 % cream [Pharmacy Med Name: Triamcinolone Acetonide 0.1 % Cream] 80 g 11     Sig: APPLY TOPICALLY TO AFFECTED AREA(S) ONCE DAILY AS NEEDED FOR DRY SKIN       There is no refill protocol information for this order

## 2021-06-11 NOTE — PROGRESS NOTES
"Assessment / Impression     1. Bilateral impacted cerumen     2. Vitamin B12 deficiency     3. Trisomy 21 (Down Syndrome)         Plan:     I removed the impacted cerumen from his ear canals myself using the lighted spatula. He tolerated the procedure well.  I recommended he follow-up in 1 month to reevaluate this.  He was given his vitamin B12 injection today.  The group home will continue to monitor for worsening signs of dementia. He will be following up with audiology as scheduled.      Subjective:      HPI: Christian Bobby is a 60 y.o. male with a history of Down's syndrome who presents to the clinic for follow-up.  He has a history of cerumen impaction and needs to have the cerumen removed every 1-2 months.  A staff person from his group home is present and reports that the wax buildup seems worse over the past few weeks.  He wears hearing aids in both ears and has regular audiology appointments every 3 months.  He is not complaining of pain or drainage in his ears.  His energy level has been good.  He also has a history of vitamin B12 deficiency and is due for his next injection.    Review of Systems  All other systems reviewed and are negative.     Social History     Tobacco Use   Smoking Status Never Smoker   Smokeless Tobacco Never Used       No family history on file.    Objective:     BP (!) 86/56 (Patient Site: Right Arm, Patient Position: Sitting, Cuff Size: Adult Large) Comment: Very quiet/faint B/P.  Pulse 64   Ht 5' 5\" (1.651 m)   Wt (!) 254 lb (115.2 kg)   BMI 42.27 kg/m    Physical Examination: General appearance - alert, well appearing, and in no distress  Eyes:  extraocular eye movements intact  Ears: Bilateral canals are impacted with cerumen.  Once this was removed the tympanic membranes appeared clear.  Neck: No lymphadenopathy  Heart: Regular rate and rhythm  Lungs: Clear to auscultation bilaterally.  Abdomen: Soft, nontender.    No results found for this or any previous visit (from the " past 168 hour(s)).    Current Outpatient Medications   Medication Sig     allopurinol (ZYLOPRIM) 100 MG tablet TAKE 1 TABLET BY MOUTH ONCE DAILY     artificial tears,hypromellose, (GENTEAL; SYSTANE) 0.3 % Gel Administer to both eyes 2 (two) times a day.     disposable gloves Misc Extra large and large non latex gloves for medication application, use by staff applying eye gtts given 8 times a day and eye ointment BID x 10 days.  #20 gloves daily.  #100 extra-large and #100 large.     erythromycin ophthalmic ointment APPLY A SMALL AMOUNT IN BOTH EYES QAM.(5 MINUTES AFTER EYE DROPS)     ketoconazole (NIZORAL) 2 % cream      latanoprost (XALATAN) 0.005 % ophthalmic solution INSTILL 1 DROP INTO THE RIGHT EYE EVERY EVENING     levothyroxine (SYNTHROID, LEVOTHROID) 75 MCG tablet TAKE 1 TABLET BY MOUTH ONCE DAILY     polyvinyl alcohol (ARTIFICIAL TEARS, POLYVIN ALC,) 1.4 % ophthalmic solution 1 drop into both eyes 2 times daily.     saliva stimulant (BIOTENE ORALBALANCE) Gel gel by Mucous Membrane route as needed.     timolol (BETIMOL) 0.5 % ophthalmic solution Administer 1 drop to the right eye daily.     timoloL maleate (TIMOPTIC) 0.5 % ophthalmic solution      triamcinolone (KENALOG) 0.1 % cream Apply to affected area once daily as needed for dry skin

## 2021-06-12 NOTE — TELEPHONE ENCOUNTER
Medication Request  Medication name: Pepto Bismol  [facility]ed [facility]  Reason for request: Patient has been experiencing stomach upset for the last week.  When did you use medication last?:  This week  Patient offered appointment:  patient declined  Okay to leave a detailed message: yes

## 2021-06-12 NOTE — PROGRESS NOTES
Assessment / Impression     1. Indigestion  famotidine (PEPCID) 20 MG tablet   2. Bilateral impacted cerumen     3. Vitamin B12 deficiency     4. Onychomycosis     5. Trisomy 21 (Down Syndrome)         Plan:     I recommended he stop drinking so much soda pop, but he is not interested in making this change.  I sent a prescription for Pepcid that he can take once daily in the morning.  He does not typically take evening meds.  We will see how this goes over the next month.  If symptoms do not improve enough we could have him take this twice daily if needed.  He appears to have a fungal infection in the right middle fingernail.  He reports that he chews on the nail regularly and they are not interested in having him use a topical medication because of this.  There are oral medications, but these can cause liver problems.  They are simply going to monitor symptoms for now and try and prevent him from chewing on that fingernail.    I removed the impacted cerumen from his ear canals myself using the lighted spatula. He tolerated the procedure well.  I recommended he follow-up in 1 month to reevaluate this.  He was given his vitamin B12 injection today.  He will be following up with audiology as scheduled.      Subjective:      HPI: Christian Bobby is a 60 y.o. male with a history of Down's syndrome who presents to the clinic for follow-up.  He has a history of cerumen impaction and needs to have the cerumen removed every 1-2 months.  A staff person from his group home is present and reports that the wax buildup seems worse over the past few weeks.  He wears hearing aids in both ears and has regular audiology appointments every 3 months.  He is not complaining of pain or drainage in his ears.  His energy level has been good.  He also has a history of vitamin B12 deficiency and is due for his next injection.      They are concerned about abdominal discomfort and indigestion symptoms he has been experiencing.  They have tried  "Tums and Pepto-Bismol which provides some relief, but they are wondering if there is a daily medication that could be helpful.  He drinks quite a bit of soda pop regularly (up to 2 L/day) and the group home staff feel like this is likely contributing problem.  They report that he would not likely be willing to change this.  They are also concerned about thickening and damage to the right middle fingernail.  This has been a problem for all a long time.  He chews on the fingernail regularly.  It is not causing pain and there are no signs of a bacterial infection.    Review of Systems  All other systems reviewed and are negative.     Social History     Tobacco Use   Smoking Status Never Smoker   Smokeless Tobacco Never Used       No family history on file.    Objective:     BP 96/60 (Patient Site: Right Arm, Patient Position: Sitting, Cuff Size: Adult Large)   Pulse 60   Resp 16   Ht 5' 5\" (1.651 m)   Wt (!) 247 lb 14.4 oz (112.4 kg)   BMI 41.25 kg/m    Physical Examination: General appearance - alert, well appearing, and in no distress  Eyes:  extraocular eye movements intact  Ears: Bilateral canals are impacted with cerumen.  Once this was removed the tympanic membranes appeared clear.  Neck: No lymphadenopathy  Heart: Regular rate and rhythm  Lungs: Clear to auscultation bilaterally.  Abdomen: Soft, nontender.  Extremities: There is cracking with slight yellowish discoloration and thickening of the nail on the right middle finger.  No results found for this or any previous visit (from the past 168 hour(s)).    Current Outpatient Medications   Medication Sig     allopurinol (ZYLOPRIM) 100 MG tablet TAKE 1 TABLET BY MOUTH ONCE DAILY     artificial tears,hypromellose, (GENTEAL; SYSTANE) 0.3 % Gel Administer to both eyes 2 (two) times a day.     bismuth subsalicylate 262 mg Tab Take 2 tablets by mouth 4 (four) times a day as needed (Upset stomach).     disposable gloves Misc Extra large and large non latex gloves " for medication application, use by staff applying eye gtts given 8 times a day and eye ointment BID x 10 days.  #20 gloves daily.  #100 extra-large and #100 large.     erythromycin ophthalmic ointment APPLY A SMALL AMOUNT IN BOTH EYES QAM.(5 MINUTES AFTER EYE DROPS) (Patient taking differently: APPLY A SMALL AMOUNT (5 MG) IN BOTH EYES QAM.(5 MINUTES AFTER EYE DROPS))     latanoprost (XALATAN) 0.005 % ophthalmic solution INSTILL 1 DROP INTO THE RIGHT EYE EVERY EVENING     levothyroxine (SYNTHROID, LEVOTHROID) 75 MCG tablet TAKE 1 TABLET BY MOUTH ONCE DAILY     timolol (BETIMOL) 0.5 % ophthalmic solution Administer 1 drop to the right eye daily.     triamcinolone (KENALOG) 0.1 % cream APPLY TOPICALLY TO AFFECTED AREA(S) ONCE DAILY AS NEEDED FOR DRY SKIN     famotidine (PEPCID) 20 MG tablet Take 1 tablet (20 mg total) by mouth daily.     polyvinyl alcohol (ARTIFICIAL TEARS, POLYVIN ALC,) 1.4 % ophthalmic solution 1 drop into both eyes 2 times daily.     saliva stimulant (BIOTENE ORALBALANCE) Gel gel by Mucous Membrane route as needed.

## 2021-06-12 NOTE — PROGRESS NOTES
Assessment / Impression     1. Ankle injury, left, initial encounter  XR Ankle Left 3 or More VWS   2. Swelling of ankle, left     3. Morbid obesity (H)     4. Trisomy 21 (Down Syndrome)           Plan:     I recommended that we check an x-ray of the left ankle.  We were able to get this done during his appointment today.  It showed no acute fractures or dislocations.  There is spurring involving the Achilles and plantar fascia insertion on the calcaneus and fifth metatarsal base, and small adjacent chronic heterotopic bone fragments.  I recommended rest, elevation, ice as needed.  He was also given an ankle splint/brace that he can use as needed.  He may continue to use his cane.  If symptoms do not improve he will follow up.  I will be seeing him anyway in about 2-3 weeks for his regular monthly checkup and cerumen removal.    Subjective:      HPI: Christian Bobby is a 60 y.o. male who has a history of obesity and Down syndrome presents to the clinic to be evaluated for a left ankle injury.  He reports twisting it.  His mother and the group home staff noticed that he was limping last week on 10/21/2020.  That is when he told him that he twisted it, but no one witnessed the injury.  They have noticed ongoing limping and increased swelling in the left ankle and foot.  He has been able to ambulate with a cane.  He does not have a history of ankle fractures or surgery.        Review of Systems  All other systems reviewed and are negative.     Social History     Tobacco Use   Smoking Status Never Smoker   Smokeless Tobacco Never Used       No family history on file.    Objective:     /68 (Patient Site: Right Arm, Patient Position: Sitting, Cuff Size: Adult Regular)   Pulse 64   Temp 98  F (36.7  C) (Oral)   Resp 16   Wt (!) 262 lb (118.8 kg)   BMI 43.60 kg/m    Physical Examination: General appearance - alert, well appearing, and in no distress  Neurological: alert, no focal findings or movement disorder  noted.    Extremities: There is swelling involving the left ankle and foot.  He is mildly tender to palpation diffusely throughout the ankle.  No ecchymosis seen.  He is able to bear weight with some difficulty and ambulates with a cane.  Psychiatric: Normal affect. Does not appear anxious or depressed.    No results found for this or any previous visit (from the past 168 hour(s)).    Current Outpatient Medications   Medication Sig     allopurinol (ZYLOPRIM) 100 MG tablet TAKE 1 TABLET BY MOUTH ONCE DAILY     artificial tears,hypromellose, (GENTEAL; SYSTANE) 0.3 % Gel Administer to both eyes 2 (two) times a day.     disposable gloves Misc Extra large and large non latex gloves for medication application, use by staff applying eye gtts given 8 times a day and eye ointment BID x 10 days.  #20 gloves daily.  #100 extra-large and #100 large.     erythromycin ophthalmic ointment APPLY A SMALL AMOUNT IN BOTH EYES QAM.(5 MINUTES AFTER EYE DROPS) (Patient taking differently: APPLY A SMALL AMOUNT (5 MG) IN BOTH EYES QAM.(5 MINUTES AFTER EYE DROPS))     famotidine (PEPCID) 20 MG tablet Take 1 tablet (20 mg total) by mouth daily.     latanoprost (XALATAN) 0.005 % ophthalmic solution INSTILL 1 DROP INTO THE RIGHT EYE EVERY EVENING     levothyroxine (SYNTHROID, LEVOTHROID) 75 MCG tablet TAKE 1 TABLET BY MOUTH ONCE DAILY     polyvinyl alcohol (ARTIFICIAL TEARS, POLYVIN ALC,) 1.4 % ophthalmic solution 1 drop into both eyes 2 times daily.     saliva stimulant (BIOTENE ORALBALANCE) Gel gel by Mucous Membrane route as needed.     timolol (BETIMOL) 0.5 % ophthalmic solution Administer 1 drop to the right eye daily.     triamcinolone (KENALOG) 0.1 % cream APPLY TOPICALLY TO AFFECTED AREA(S) ONCE DAILY AS NEEDED FOR DRY SKIN

## 2021-06-12 NOTE — PROGRESS NOTES
Subjective findings: Mr. Bobby returned to the clinic today for continued foot care complaining of    long thick nails on both feet. Also complaining of a callus right foot.  OBJECTIVE FINDINGS: Elongated nails 1 through 5 both feet. All nails are 2    times normal thickness with the exception of left great toenail, which is 3    to 4 times normal thickness. Skin bilaterally is cool, dry, and scaly. There is a thick hyperkeratotic    lesion medial aspect first metatarsal phalangeal joint right foot. DP    and PT pulses +1/4 bilaterally. Capillary refill less than 2 seconds    bilaterally. Negative clonus. Negative Babinski bilaterally. Range of    motion within normal limits bilaterally. Muscle power +5/5 bilaterally    within all compartments.    ASSESSMENT: Onychomycosis onychauxis.   PLAN: Trimmed dystrophic nails 1 through 5 both feet.I have recommended    that Mr. Bobby return to clinic every 2 to 3 months for continued foot care

## 2021-06-12 NOTE — PROGRESS NOTES
"Assessment / Impression     1. Bilateral impacted cerumen     2. Vitamin B12 Deficiency     3. Osteoarthritis of knees, bilateral     4. Trisomy 21 (Down Syndrome)         Plan:     I removed a large amount of cerumen from his ear canals myself using the lighted spatula. The CA used saline irrigation to remove the remaining wax from the left ear. He tolerated the procedure well.  I recommended he follow-up in 1 month to reevaluate this.  He was given his vitamin B12 injection today.  Follow-up with me as needed for corticosteroid knee injections, no sooner than every 3 months.    Subjective:      HPI: Christian Bobby is a 56 y.o. male who presents to the clinic to have his ears evaluated.  He has a history of cerumen impaction and needs to have the cerumen removed every 1-2 months.  A staff person from his group home is present and reports that the wax buildup has become much worse over the past few weeks.  They have noticed diminished hearing because of this.  He wears hearing aids in both ears.  He has regular audiology appointments and needs to have the wax removed prior to those visits.  He also has a history of vitamin B12 deficiency and is due for his next injection.  Continues to struggle with knee pain, worse on the right.  He has a history of arthritis. He saw ortho and steroid injections have been helpful. They are wondering if I can do the injections if needed in the future.         Review of Systems  All other systems reviewed and are negative.     History   Smoking Status     Never Smoker   Smokeless Tobacco     Not on file       No family history on file.    Objective:     /60 (Patient Site: Left Arm, Patient Position: Sitting, Cuff Size: Adult Large)  Pulse 72  Temp 99.2  F (37.3  C) (Oral)   Resp 20  Ht 5' 6\" (1.676 m)  Wt (!) 274 lb 3 oz (124.4 kg)  BMI 44.26 kg/m2  Physical Examination: General appearance - alert, well appearing, and in no distress  Eyes:  extraocular eye movements " intact  Ears: Bilateral canals are impacted with cerumen.  Once this was removed the tympanic membranes appeared clear.  Neck: No lymphadenopathy  Heart: Regular rate and rhythm  Lungs: Clear to auscultation bilaterally.  Skin: Scalp is clear.  No results found for this or any previous visit (from the past 168 hour(s)).    Current Outpatient Prescriptions   Medication Sig Note     allopurinol (ZYLOPRIM) 100 MG tablet Take 100 mg by mouth daily.      disposable gloves Misc Extra large and large non latex gloves for medication application, use by staff applying eye gtts given 8 times a day and eye ointment BID x 10 days.  #20 gloves daily.  #100 extra-large and #100 large.      erythromycin ophthalmic ointment apply (1CM)  by ophthalmic route 1 time every day ribbon into the lower conjunctival sac(s) in the affected eye(s) 2/3/2016: Received from: [facility]     L.acidophilus-Bifido.longum 15 mg (1 billion cell) CpDR Take 1 capsule by mouth daily.      latanoprost (XALATAN) 0.005 % ophthalmic solution Administer 1 drop to the right eye bedtime.      levothyroxine (SYNTHROID, LEVOTHROID) 75 MCG tablet Take 75 mcg by mouth daily.      naproxen (NAPROSYN) 500 MG tablet Take 1 tablet (500 mg total) by mouth as needed.      saliva stimulant (BIOTENE ORALBALANCE) Gel gel by Mucous Membrane route as needed.      triamcinolone (KENALOG) 0.1 % cream Apply to affected area once daily as needed for dry skin

## 2021-06-12 NOTE — PROGRESS NOTES
Assessment / Impression     1. Bilateral impacted cerumen     2. Vitamin B12 Deficiency     3. Osteoarthritis of knees, bilateral     4. Trisomy 21 (Down Syndrome)     5. Onychomycosis  Ambulatory referral to Podiatry   6. Need for vaccination  Influenza, Seasonal,Quad Inj, 36+ MOS       Plan:     I removed a large amount of cerumen from his ear canals myself using the lighted spatula. He tolerated the procedure well.  I recommended he follow-up in 1 month to reevaluate this.  He was given his vitamin B12 injection today.  I explained that it is too soon to repeat the corticosteroid injections.  These should not be given sooner than 3 months from the previous.  His last injection was on 6/15/17.  They are planning to schedule follow-up appointment for this at some point after 9/15/17.     Subjective:      HPI: Christian Bobby is a 56 y.o. male who presents to the clinic for follow-up.  He has a history of cerumen impaction and needs to have the cerumen removed every 1-2 months.  A staff person from his group home is present and reports that the wax buildup has become much worse over the past few weeks.  They have noticed diminished hearing because of this.  He wears hearing aids in both ears.  He has regular audiology appointments and needs to have the wax removed prior to those visits.  He also has a history of vitamin B12 deficiency and is due for his next injection.  He continues to struggle with knee pain, worse on the right.  He has a history of osteoarthritis. He saw ortho and had steroid injections on 6/15/17. They are wondering if I can do the injections again today.  They have noticed that he seems to be more uncomfortable due to knee pain.         Review of Systems  All other systems reviewed and are negative.     History   Smoking Status     Never Smoker   Smokeless Tobacco     Not on file       No family history on file.    Objective:     /60 (Patient Site: Left Arm, Patient Position: Sitting, Cuff  "Size: Adult Large)  Pulse 72  Temp 98.7  F (37.1  C) (Oral)   Resp 16  Ht 5' 6\" (1.676 m)  Wt (!) 275 lb 5 oz (124.9 kg)  BMI 44.44 kg/m2  Physical Examination: General appearance - alert, well appearing, and in no distress  Eyes:  extraocular eye movements intact  Ears: Bilateral canals are impacted with cerumen.  Once this was removed the tympanic membranes appeared clear.  Neck: No lymphadenopathy  Heart: Regular rate and rhythm  Lungs: Clear to auscultation bilaterally.    No results found for this or any previous visit (from the past 168 hour(s)).    Current Outpatient Prescriptions   Medication Sig Note     allopurinol (ZYLOPRIM) 100 MG tablet Take 100 mg by mouth daily.      disposable gloves Misc Extra large and large non latex gloves for medication application, use by staff applying eye gtts given 8 times a day and eye ointment BID x 10 days.  #20 gloves daily.  #100 extra-large and #100 large.      erythromycin ophthalmic ointment apply (1CM)  by ophthalmic route 1 time every day ribbon into the lower conjunctival sac(s) in the affected eye(s) 2/3/2016: Received from: [facility]     L.acidophilus-Bifido.longum 15 mg (1 billion cell) CpDR Take 1 capsule by mouth daily.      latanoprost (XALATAN) 0.005 % ophthalmic solution Administer 1 drop to the right eye bedtime.      levothyroxine (SYNTHROID, LEVOTHROID) 75 MCG tablet Take 75 mcg by mouth daily.      naproxen (NAPROSYN) 500 MG tablet Take 1 tablet (500 mg total) by mouth as needed.      saliva stimulant (BIOTENE ORALBALANCE) Gel gel by Mucous Membrane route as needed.      triamcinolone (KENALOG) 0.1 % cream Apply to affected area once daily as needed for dry skin        "

## 2021-06-12 NOTE — PROGRESS NOTES
"Assessment / Impression     1. Bilateral impacted cerumen     2. Vitamin B12 deficiency     3. Trisomy 21 (Down Syndrome)         Plan:     I removed the impacted cerumen from his ear canals myself using the lighted spatula. He tolerated the procedure well.  I recommended he follow-up in 1 month to reevaluate this.  He was given his vitamin B12 injection today.  He will be following up with audiology as scheduled.      Subjective:      HPI: Christian Bobby is a 60 y.o. male with a history of Down's syndrome who presents to the clinic for follow-up.  He has a history of cerumen impaction and needs to have the cerumen removed every 1-2 months.  A staff person from his group home is present and reports that the wax buildup seems worse over the past few weeks.  He wears hearing aids in both ears and has regular audiology appointments every 3 months.  He is not complaining of pain or drainage in his ears.  His energy level has been good.  He also has a history of vitamin B12 deficiency and is due for his next injection.        Review of Systems  All other systems reviewed and are negative.     Social History     Tobacco Use   Smoking Status Never Smoker   Smokeless Tobacco Never Used       No family history on file.    Objective:     /62 (Patient Site: Right Arm, Patient Position: Sitting, Cuff Size: Adult Large)   Pulse 68   Resp 16   Ht 5' 5\" (1.651 m)   Wt (!) 263 lb 1.6 oz (119.3 kg)   BMI 43.78 kg/m    Physical Examination: General appearance - alert, well appearing, and in no distress  Eyes:  extraocular eye movements intact  Ears: Bilateral canals are impacted with cerumen.  Once this was removed the tympanic membranes appeared clear.  Neck: No lymphadenopathy  Heart: Regular rate and rhythm  Lungs: Clear to auscultation bilaterally.  Abdomen: Soft, nontender.  No results found for this or any previous visit (from the past 168 hour(s)).    Current Outpatient Medications   Medication Sig     allopurinol " (ZYLOPRIM) 100 MG tablet TAKE 1 TABLET BY MOUTH ONCE DAILY     artificial tears,hypromellose, (GENTEAL; SYSTANE) 0.3 % Gel Administer to both eyes 2 (two) times a day.     clotrimazole-betamethasone (LOTRISONE) cream      disposable gloves Misc Extra large and large non latex gloves for medication application, use by staff applying eye gtts given 8 times a day and eye ointment BID x 10 days.  #20 gloves daily.  #100 extra-large and #100 large.     erythromycin ophthalmic ointment APPLY A SMALL AMOUNT IN BOTH EYES QAM.(5 MINUTES AFTER EYE DROPS) (Patient taking differently: APPLY A SMALL AMOUNT (5 MG) IN BOTH EYES QAM.(5 MINUTES AFTER EYE DROPS))     famotidine (PEPCID) 20 MG tablet Take 1 tablet (20 mg total) by mouth daily.     latanoprost (XALATAN) 0.005 % ophthalmic solution INSTILL 1 DROP INTO THE RIGHT EYE EVERY EVENING     levothyroxine (SYNTHROID, LEVOTHROID) 75 MCG tablet TAKE 1 TABLET BY MOUTH ONCE DAILY     polyvinyl alcohol (ARTIFICIAL TEARS, POLYVIN ALC,) 1.4 % ophthalmic solution 1 drop into both eyes 2 times daily.     saliva stimulant (BIOTENE ORALBALANCE) Gel gel by Mucous Membrane route as needed.     timolol (BETIMOL) 0.5 % ophthalmic solution Administer 1 drop to the right eye daily.     triamcinolone (KENALOG) 0.1 % cream APPLY TOPICALLY TO AFFECTED AREA(S) ONCE DAILY AS NEEDED FOR DRY SKIN

## 2021-06-13 NOTE — TELEPHONE ENCOUNTER
RN cannot approve Refill Request    RN can NOT refill this medication med is not covered by policy/route to provider. Last office visit: 11/9/2020 Clifford Wooten DO Last Physical: 12/30/2019 Last MTM visit: Visit date not found Last visit same specialty: 11/9/2020 Clifford Wooten DO.  Next visit within 3 mo: Visit date not found  Next physical within 3 mo: Visit date not found      Danuta Scott, [facility] Triage/Med Refill 11/18/2020    Requested Prescriptions   Pending Prescriptions Disp Refills     allopurinoL (ZYLOPRIM) 100 MG tablet [Pharmacy Med Name: Allopurinol 100 MG Tablet] 31 tablet 11     Sig: TAKE 1 TABLET BY MOUTH ONCE DAILY.       There is no refill protocol information for this order

## 2021-06-13 NOTE — PROGRESS NOTES
"Assessment / Impression     1. Bilateral impacted cerumen     2. Vitamin B12 deficiency     3. Xerosis cutis  DISCONTINUED: triamcinolone (KENALOG) 0.1 % cream   4. Trisomy 21 (Down Syndrome)         Plan:     I removed a large amount of cerumen from his ear canals myself using the lighted spatula.  He tolerated the procedure well.  I recommended he follow-up in 1 month to reevaluate this.  He was given his vitamin B12 injection today.  I refilled the triamcinolone which has worked well for the xerosis cutis symptoms on the right side of his head/face in the past.      Subjective:      HPI: Christian Bobby is a 57 y.o. male who presents to the clinic to have his ears evaluated.  He has a history of cerumen impaction and needs to have the cerumen removed every 1-2 months.  A staff person from his group home is present and reports that the wax buildup has become much worse over the past few weeks.  They have noticed diminished hearing because of this.  He wears hearing aids in both ears.   He also has a history of vitamin B12 deficiency and is due for his next injection.  The group home is requesting a refill of triamcinolone because the dry scaly skin on the right side of his face has become significantly worse since stopping it.         Review of Systems  All other systems reviewed and are negative.     History   Smoking Status     Never Smoker   Smokeless Tobacco     Never Used       No family history on file.    Objective:     BP 96/60 (Patient Site: Left Arm, Patient Position: Sitting, Cuff Size: Adult Large)  Pulse 76  Temp 99.2  F (37.3  C) (Oral)   Resp 16  Ht 5' 6\" (1.676 m)  Wt (!) 272 lb 3 oz (123.5 kg)  BMI 43.93 kg/m2  Physical Examination: General appearance - alert, well appearing, and in no distress  Eyes:  extraocular eye movements intact  Ears: Bilateral canals are impacted with cerumen.  Once this was removed the tympanic membranes appeared clear.  Neck: No lymphadenopathy  Heart: Regular rate " and rhythm  Lungs: Clear to auscultation bilaterally.  Skin: There is an erythematous patch of dry scaly skin on the right side of his head/face.  No results found for this or any previous visit (from the past 168 hour(s)).    Current Outpatient Prescriptions   Medication Sig Note     allopurinol (ZYLOPRIM) 100 MG tablet Take 100 mg by mouth daily.      artificial tears,hypromellose, (GENTEAL; SYSTANE) 0.3 % Gel Administer to both eyes.      disposable gloves Misc Extra large and large non latex gloves for medication application, use by staff applying eye gtts given 8 times a day and eye ointment BID x 10 days.  #20 gloves daily.  #100 extra-large and #100 large.      erythromycin ophthalmic ointment apply (1CM)  by ophthalmic route 1 time every day ribbon into the lower conjunctival sac(s) in the affected eye(s) 2/3/2016: Received from: [facility]     latanoprost (XALATAN) 0.005 % ophthalmic solution Administer 1 drop to the right eye bedtime.      levothyroxine (SYNTHROID, LEVOTHROID) 75 MCG tablet Take 75 mcg by mouth daily.      saliva stimulant (BIOTENE ORALBALANCE) Gel gel by Mucous Membrane route as needed.      triamcinolone (KENALOG) 0.1 % cream Apply to affected area once daily as needed for dry skin

## 2021-06-13 NOTE — PROGRESS NOTES
Assessment / Impression     1. Bilateral impacted cerumen     2. Vitamin B12 Deficiency     3. Osteoarthritis of knees, bilateral  methylPREDNISolone acetate injection 40 mg (DEPO-MEDROL)    methylPREDNISolone acetate injection 40 mg (DEPO-MEDROL)   4. Trisomy 21 (Down Syndrome)         Plan:     I removed a large amount of cerumen from his ear canals myself using the lighted spatula. He tolerated the procedure well.  I recommended he follow-up in 1 month to reevaluate this.  He was given his vitamin B12 injection today.  We decided to do bilateral cortisone injections into the knees since his pain is been getting worse.  He responded well to this in the past.  His last injection was on 6/15/17 through Ortho.  I explained the risks of the procedure.  The brought in sign paperwork from his guardian authorizing treatment as needed for knee pain.  I cleaned the areas with iodine and alcohol swabs.  I injected 40 mg of Depo-Medrol with 5 mL of lidocaine into each knee using an anterior lateral approach which he tolerated well.    Subjective:      HPI: Christian Bobby is a 57 y.o. male who presents to the clinic for follow-up.  He has a history of cerumen impaction and needs to have the cerumen removed every 1-2 months.  A staff person from his group home is present and reports that the wax buildup has become much worse over the past few weeks.  They have noticed diminished hearing because of this.  He wears hearing aids in both ears.  He has regular audiology appointments and needs to have the wax removed prior to those visits.  He also has a history of vitamin B12 deficiency and is due for his next injection.  He continues to struggle with bilateral knee pain.  He has a history of osteoarthritis. He saw ortho on 6/15/17.  X-rays of the knees that appointment showed arthritic changes including medial joint space narrowing.  He had steroid injections at that appointment which were quite helpful.  They have noticed that he  "seems to be more uncomfortable due to knee pain and they would like to have steroid injections again today.         Review of Systems  All other systems reviewed and are negative.     History   Smoking Status     Never Smoker   Smokeless Tobacco     Never Used       No family history on file.    Objective:     /64 (Patient Site: Right Arm, Patient Position: Sitting, Cuff Size: Adult Large)  Pulse 64  Temp (!) 48.6  F (9.2  C) (Oral)   Resp 16  Ht 5' 6\" (1.676 m)  Wt (!) 275 lb 7 oz (124.9 kg)  BMI 44.46 kg/m2  Physical Examination: General appearance - alert, well appearing, and in no distress  Eyes:  extraocular eye movements intact  Ears: Bilateral canals are impacted with cerumen.  Once this was removed the tympanic membranes appeared clear.  Neck: No lymphadenopathy  Heart: Regular rate and rhythm  Lungs: Clear to auscultation bilaterally.  Extremities: No effusions in the knees.  He is moderately tender over the medial joint spaces bilaterally.  There is trace crepitation with bilateral knee flexion and extension.  No results found for this or any previous visit (from the past 168 hour(s)).    Current Outpatient Prescriptions   Medication Sig Note     allopurinol (ZYLOPRIM) 100 MG tablet Take 100 mg by mouth daily.      disposable gloves Misc Extra large and large non latex gloves for medication application, use by staff applying eye gtts given 8 times a day and eye ointment BID x 10 days.  #20 gloves daily.  #100 extra-large and #100 large.      erythromycin ophthalmic ointment apply (1CM)  by ophthalmic route 1 time every day ribbon into the lower conjunctival sac(s) in the affected eye(s) 2/3/2016: Received from: [facility]     latanoprost (XALATAN) 0.005 % ophthalmic solution Administer 1 drop to the right eye bedtime.      levothyroxine (SYNTHROID, LEVOTHROID) 75 MCG tablet Take 75 mcg by mouth daily.      saliva stimulant (BIOTENE ORALBALANCE) Gel gel by Mucous Membrane route as needed.  "     triamcinolone (KENALOG) 0.1 % cream Apply to affected area once daily as needed for dry skin

## 2021-06-14 NOTE — PROGRESS NOTES
Assessment / Impression     1. Bilateral impacted cerumen     2. Vitamin B12 deficiency     3. Osteoarthritis of knees, bilateral  Ambulatory referral to Orthopedic Surgery   4. Trisomy 21 (Down Syndrome)         Plan:     I removed a large amount of cerumen from his ear canals myself using the lighted spatula. He tolerated the procedure well.  I recommended he follow-up in 1 month to reevaluate this.  He was given his vitamin B12 injection today.  He was given another referral to see an orthopedic specialist since the last clinic was difficult for them to get to.  Our specialty schedulers helping to see if there is a closer option.      Subjective:      HPI: Christian Bobby is a 57 y.o. male who presents to the clinic for follow-up.  He has a history of cerumen impaction and needs to have the cerumen removed every 1-2 months.  A staff person from his group home is present and reports that the wax buildup has become much worse over the past few weeks.  They have noticed diminished hearing because of this.  He wears hearing aids in both ears.  He has regular audiology appointments.  He also has a history of vitamin B12 deficiency and is due for his next injection.  He continues to struggle with bilateral knee pain.  He has a history of osteoarthritis. He saw ortho on 6/15/17.  X-rays of the knees that appointment showed arthritic changes including medial joint space narrowing.  He had steroid injections on 6/15/17 and in our clinic on 9/27/17 which were quite helpful.  The group home staff member reports that his parents would like him to see orthopedics again to consider Synvisc injections.       Review of Systems  All other systems reviewed and are negative.     History   Smoking Status     Never Smoker   Smokeless Tobacco     Never Used       No family history on file.    Objective:     /66 (Patient Site: Left Arm, Patient Position: Sitting, Cuff Size: Adult Large)  Pulse 72  Temp 99.5  F (37.5  C) (Oral)    "Resp 16  Ht 5' 6\" (1.676 m)  Wt (!) 272 lb (123.4 kg)  BMI 43.9 kg/m2  Physical Examination: General appearance - alert, well appearing, and in no distress  Eyes:  extraocular eye movements intact  Ears: Bilateral canals are impacted with cerumen.  Once this was removed the tympanic membranes appeared clear.  Neck: No lymphadenopathy  Heart: Regular rate and rhythm  Lungs: Clear to auscultation bilaterally.  Extremities: No effusions in the knees.  He is moderately tender over the medial joint spaces bilaterally.   No results found for this or any previous visit (from the past 168 hour(s)).    Current Outpatient Prescriptions   Medication Sig Note     allopurinol (ZYLOPRIM) 100 MG tablet Take 100 mg by mouth daily.      artificial tears,hypromellose, (GENTEAL; SYSTANE) 0.3 % Gel Administer to both eyes.      disposable gloves Misc Extra large and large non latex gloves for medication application, use by staff applying eye gtts given 8 times a day and eye ointment BID x 10 days.  #20 gloves daily.  #100 extra-large and #100 large.      erythromycin ophthalmic ointment apply (1CM)  by ophthalmic route 1 time every day ribbon into the lower conjunctival sac(s) in the affected eye(s) 2/3/2016: Received from: [facility]     latanoprost (XALATAN) 0.005 % ophthalmic solution Administer 1 drop to the right eye bedtime.      levothyroxine (SYNTHROID, LEVOTHROID) 75 MCG tablet Take 75 mcg by mouth daily.      saliva stimulant (BIOTENE ORALBALANCE) Gel gel by Mucous Membrane route as needed.      triamcinolone (KENALOG) 0.1 % cream Apply to affected area once daily as needed for dry skin        "

## 2021-06-14 NOTE — PROGRESS NOTES
Subjective findings: Mr. Bobby returned to the clinic today for continued foot care complaining of    long thick nails on both feet. Also complaining of a callus right foot.  OBJECTIVE FINDINGS: Elongated nails 1 through 5 both feet. All nails are 2    times normal thickness with the exception of left great toenail, which is 3    to 4 times normal thickness. Skin bilaterally is cool, dry, and scaly. There is a thick hyperkeratotic    lesion medial aspect first metatarsal phalangeal joint right foot. DP    and PT pulses +1/4 bilaterally. Capillary refill less than 2 seconds    bilaterally. Negative clonus. Negative Babinski bilaterally. Range of    motion within normal limits bilaterally. Muscle power +5/5 bilaterally    within all compartments.    ASSESSMENT: Onychomycosis onychauxis.   PLAN: Debrided nails 1 through 5 both feet.I have recommended    that Mr. Bobby return to clinic every 2 to 3 months for continued foot care

## 2021-06-14 NOTE — PROGRESS NOTES
Assessment and Plan:     1. Encounter for general adult medical examination with abnormal findings  Comprehensive Metabolic Panel    Lipid Cascade   2. Osteoarthritis of knees, bilateral   Will be seeing a new orthopedic specialist to discuss Synvisc injections.   3. Trisomy 21 (Down Syndrome)   Will continue to live in the group home where he gets assistance with ADLs.   4. Vitamin B12 deficiency  Vitamin B12   5. Onychomycosis  Will continue close follow-up with podiatry   6. Bilateral impacted cerumen  Will follow-up every 1-2 months to have cerumen removed   7. Seborrheic dermatitis  May continue triamcinolone as needed which has been helpful.   8. Obesity  Discussed importance of eating a healthy, low sugar diet and getting regular physical activity.   9. Hearing difficulty of both ears  Continue close follow-up with audiology   10. Hypothyroidism  Thyroid Cascade   11. Prostate cancer screening  PSA (Prostatic-Specific Antigen), Annual Screen   12. Screening for tuberculosis  TB Skin Test         The patient's current medical problems were reviewed.    The following high BMI interventions were performed this visit: encouragement to exercise and lifestyle education regarding diet  The following health maintenance schedule was reviewed with the patient and provided in printed form in the after visit summary:   Health Maintenance   Topic Date Due     COLONOSCOPY  01/01/2018 (Originally 9/4/2012)     TDAP ADULT ONE TIME DOSE  08/26/2020     ADVANCE DIRECTIVES DISCUSSED WITH PATIENT  02/03/2021     INFLUENZA VACCINE RULE BASED  Completed     TD 18+ HE  Excluded        Subjective:   Chief Complaint: Christian Bobby is an 57 y.o. male here for an Annual Wellness visit.   HPI: He presents to the clinic with a group home staff member who provides some of the history.  Jhoan complains of ongoing bilateral knee pain.  He has a history of bilateral knee arthritis and has had some relief with corticosteroid injections.  He  is in the process of getting into a new orthopedic specialist for Synvisc injections.  He comes to the clinic every 1-2 months to have cerumen removed and to receive vitamin B12 injections.     Review of Systems:   Please see above.  The rest of the review of systems are negative for all systems.    Patient Care Team:  Clifford Padilla DO as PCP - General (Family Medicine)     Patient Active Problem List   Diagnosis     Moderate Intellectual Disabilities     Cataract     Osteoarthritis of knees, bilateral     Trisomy 21 (Down Syndrome)     Joint Pain, Localized In The Knee     Lower Back Pain     Hypothyroidism     Gout     Obesity     Prediabetes     Thyroglossal Cyst     Dementia     Vitamin B12 deficiency     Narcolepsy     Sleep Apnea     Tinea Pedis     Cerumen Impaction In Both Ears     Xerosis Cutis     Corneal disorder     Onychomycosis     Callus of foot     Cerumen impaction     Hearing difficulty of both ears     Seborrheic dermatitis     No past medical history on file.   Past Surgical History:   Procedure Laterality Date     [place] EXCIS THYROID DUCT CYST/SINUS      Description: Thyroglossal Duct Cyst Excision;  Recorded: 05/01/2012;      No family history on file.   Social History     Social History     Marital status: Single     Spouse name: N/A     Number of children: N/A     Years of education: N/A     Occupational History     Not on file.     Social History Main Topics     Smoking status: Never Smoker     Smokeless tobacco: Never Used     Alcohol use No     Drug use: No     Sexual activity: Not on file     Other Topics Concern     Not on file     Social History Narrative      Current Outpatient Prescriptions   Medication Sig Dispense Refill     allopurinol (ZYLOPRIM) 100 MG tablet Take 100 mg by mouth daily.       artificial tears,hypromellose, (GENTEAL; SYSTANE) 0.3 % Gel Administer to both eyes.       disposable gloves Misc Extra large and large non latex gloves for medication  "application, use by staff applying eye gtts given 8 times a day and eye ointment BID x 10 days.  #20 gloves daily.  #100 extra-large and #100 large. 200 each 0     erythromycin ophthalmic ointment apply (1CM)  by ophthalmic route 1 time every day ribbon into the lower conjunctival sac(s) in the affected eye(s)       latanoprost (XALATAN) 0.005 % ophthalmic solution Administer 1 drop to the right eye bedtime.       levothyroxine (SYNTHROID, LEVOTHROID) 75 MCG tablet Take 75 mcg by mouth daily.       saliva stimulant (BIOTENE ORALBALANCE) Gel gel by Mucous Membrane route as needed.       triamcinolone (KENALOG) 0.1 % cream Apply to affected area once daily as needed for dry skin 30 g 3     Current Facility-Administered Medications   Medication Dose Route Frequency Provider Last Rate Last Dose     cyanocobalamin injection 1,000 mcg  1,000 mcg Intramuscular Q30 Days Lexi Thibodeaux MD   1,000 mcg at 10/20/16 1519     cyanocobalamin injection 1,000 mcg  1,000 mcg Intramuscular Q30 Days Lexi Thibodeaux MD   1,000 mcg at 11/29/17 1519      Objective:   Vital Signs:   Visit Vitals     /66 (Patient Site: Left Arm, Patient Position: Sitting, Cuff Size: Adult Large)     Pulse 68     Temp 99.2  F (37.3  C) (Oral)     Resp 16     Ht 5' 5\" (1.651 m)     Wt (!) 271 lb 4 oz (123 kg)     BMI 45.14 kg/m2        VisionScreening:  No exam data present     PHYSICAL EXAM    General Appearance: Alert, cooperative, no distress, appears stated age  Head: Normocephalic, without obvious abnormality, atraumatic  Eyes: PERRL, conjunctiva/corneas clear, EOM's intact  Ears: Normal TM's and external ear canals, both ears.  There is a mild-moderate amount of nonobstructive earwax bilaterally  Nose: Nares normal, septum midline,mucosa normal, no drainage  Throat: Lips, mucosa, and tongue normal; teeth and gums normal  Neck: Supple, symmetrical, trachea midline, no adenopathy;  thyroid: not enlarged, symmetric, no " tenderness/mass/nodules  Back: Symmetric, no curvature, ROM normal, no CVA tenderness  Lungs: Clear to auscultation bilaterally, respirations unlabored  Heart: Regular rate and rhythm, S1 and S2 normal, no murmur, rub, or gallop,  Abdomen: Soft, non-tender, bowel sounds active all four quadrants,  no masses, no organomegaly  Musculoskeletal: Normal range of motion. No joint swelling or deformity.   Extremities: Tender to palpation over both knees, especially with flexion and extension maneuvers.  No obvious effusions.  Skin: Skin color, texture, turgor normal, no rashes or lesions  Lymph nodes: Cervical, supraclavicular, and axillary nodes normal  Neurologic: He is alert.  No focal deficits.  Normal deep tendon reflexes.   Psychiatric: He has a normal mood and affect.       Assessment Results 12/11/2017   Activities of Daily Living 5-6 - Severe functional impairment   Instrumental Activities of Daily Living 5-6 - Severe functional impairment   Get Up and Go Score 12 seconds or more   Mini Cog Total Score 0   Some recent data might be hidden     A Mini-Cog score of 0-2 suggests the possibility of dementia, score of 3-5 suggests no dementia    Identified Health Risks:     The patient reports that he has difficulty with activities of daily living.  He lives in a group home where he gets help with ADLs.  The patient reports that he has difficulty with instrumental activities of daily living.  He will continue living at the group home where he gets assistance with ADLs.    The patient was provided with written information regarding signs of hearing loss.  He will continue using his hearing aids and follow-up closely with audiology.  He is at risk for falling and has been provided with information to reduce the risk of falling at home.  The patient was provided with appropriate referrals to address his memory problem.

## 2021-06-15 NOTE — PROGRESS NOTES
Name: Christian Bobby  Age: 57 y.o.  Gender: male  : 1960  Date of Encounter: 2018      HPI:  Christian Bobby is a 57 y.o.  male who presents to the clinic with    Current Medication:   Medications reviewed and updated.    Current Outpatient Prescriptions:      allopurinol (ZYLOPRIM) 100 MG tablet, Take 100 mg by mouth daily., Disp: , Rfl:      artificial tears,hypromellose, (GENTEAL; SYSTANE) 0.3 % Gel, Administer to both eyes., Disp: , Rfl:      disposable gloves Misc, Extra large and large non latex gloves for medication application, use by staff applying eye gtts given 8 times a day and eye ointment BID x 10 days.  #20 gloves daily.  #100 extra-large and #100 large., Disp: 200 each, Rfl: 0     erythromycin ophthalmic ointment, apply (1CM)  by ophthalmic route 1 time every day ribbon into the lower conjunctival sac(s) in the affected eye(s), Disp: , Rfl:      latanoprost (XALATAN) 0.005 % ophthalmic solution, Administer 1 drop to the right eye bedtime., Disp: , Rfl:      levothyroxine (SYNTHROID, LEVOTHROID) 75 MCG tablet, Take 75 mcg by mouth daily., Disp: , Rfl:      saliva stimulant (BIOTENE ORALBALANCE) Gel gel, by Mucous Membrane route as needed., Disp: , Rfl:      triamcinolone (KENALOG) 0.1 % cream, Apply to affected area once daily as needed for dry skin, Disp: 30 g, Rfl: 3    Current Facility-Administered Medications:      cyanocobalamin injection 1,000 mcg, 1,000 mcg, Intramuscular, Q30 Days, Lexi Thibodeaux MD, 1,000 mcg at 17 1500     cyanocobalamin injection 1,000 mcg, 1,000 mcg, Intramuscular, Q30 Days, Lexi Thibodeaux MD, 1,000 mcg at 17 1519    Past Med / Surg History:  No past medical history on file.  Past Surgical History:   Procedure Laterality Date     [place] EXCIS THYROID DUCT CYST/SINUS      Description: Thyroglossal Duct Cyst Excision;  Recorded: 2012;       Fam / Soc History:  No family history on file.  Social History     Social History     Marital  status: Single     Spouse name: N/A     Number of children: N/A     Years of education: N/A     Occupational History     Not on file.     Social History Main Topics     Smoking status: Never Smoker     Smokeless tobacco: Never Used     Alcohol use No     Drug use: No     Sexual activity: Not on file     Other Topics Concern     Not on file     Social History Narrative       ROS:  14 point review of systems unremarkable except as mentioned in HPI    Objective:  Vitals: /80  Pulse 77  Resp 16  SpO2 92%    Gen: Alert, awake, well appearing  HEENT: NC, AT,   Ears:  Ear canals clear.  TMs normal appearing.  Eyes:  EOMI.  Pupils equally round and reactive to light. Conjunctivae clear.  Sclera non-icteric.  Nose:  Nasal mucosa pink, septum midline.  No sinus tenderness  Mouth:  MMM. Oropharynx clear. No tonsillar exudate. Teeth, gum, tongue and lips clear.  Neck:  Supple, FROM, No lymphandenpathy, No TM  Heart: Regular rate and rhythm; normal S1 and S2; no murmurs, gallops, or rubs.  Peripheral Vessels: Normal pulses and perfusion.  Lungs: Unlabored respirations; symmetric chest expansion; clear breath sounds.  Extremities: No clubbing, cyanosis, or edema. Normal upper and lower extremities.  Skin: Normal turgor and without lesions or rashes.  Mental Status: Alert, oriented, in no distress. Mood and affect appropriate.  Neuro: Normal tone; no focal deficits appreciated.Gait and stance normal.     Pertinent results / imaging:  none    Assessment:  Left knee swelling    Plan:      Fransisca Marin MD  1/9/2018

## 2021-06-15 NOTE — PROGRESS NOTES
Name: Christian Bobby  Age: 57 y.o.  Gender: male  : 1960  Date of Encounter: 2018      HPI:  Christian Bobby is a 57 y.o.  male with history of trisomy 21 living at a group home who presents with reviewed staff of [facility] Sarah with concerns of left knee pain and head injury.  Sarah reports patient was staying with his parents over the weekend and early  morning was walking down some stairs and fell.  Family notified [facility] of shunts fall noting he had hit the back of his head during the fall and had an abrasion.  He returned to the group home on Monday and participated in his usual activities including attending his work program.  Today Sarah took patient to a dental appointment at which time patient complained of left knee pain and was limping more than usual.  She noted today after his complaint that his left knee was swollen which she did not see yesterday.  He has a history of osteoarthritis of his knees bilaterally and has been receiving cortisone injections approximately every 3 months.  He is scheduled to follow-up with orthopedics for further evaluation of possible Synvisc injections as lately he has not been receiving as much benefit with the steroid injections.    Current Medication:   Medications reviewed and updated.    Current Outpatient Prescriptions:      allopurinol (ZYLOPRIM) 100 MG tablet, Take 100 mg by mouth daily., Disp: , Rfl:      artificial tears,hypromellose, (GENTEAL; SYSTANE) 0.3 % Gel, Administer to both eyes., Disp: , Rfl:      disposable gloves Misc, Extra large and large non latex gloves for medication application, use by staff applying eye gtts given 8 times a day and eye ointment BID x 10 days.  #20 gloves daily.  #100 extra-large and #100 large., Disp: 200 each, Rfl: 0     erythromycin ophthalmic ointment, apply (1CM)  by ophthalmic route 1 time every day ribbon into the lower conjunctival sac(s) in the affected eye(s), Disp: , Rfl:      latanoprost  (XALATAN) 0.005 % ophthalmic solution, Administer 1 drop to the right eye bedtime., Disp: , Rfl:      levothyroxine (SYNTHROID, LEVOTHROID) 75 MCG tablet, Take 75 mcg by mouth daily., Disp: , Rfl:      saliva stimulant (BIOTENE ORALBALANCE) Gel gel, by Mucous Membrane route as needed., Disp: , Rfl:      triamcinolone (KENALOG) 0.1 % cream, Apply to affected area once daily as needed for dry skin, Disp: 30 g, Rfl: 3    Current Facility-Administered Medications:      cyanocobalamin injection 1,000 mcg, 1,000 mcg, Intramuscular, Q30 Days, Lexi Thibodeaux MD, 1,000 mcg at 12/20/17 1500     cyanocobalamin injection 1,000 mcg, 1,000 mcg, Intramuscular, Q30 Days, Lexi Thibodeaux MD, 1,000 mcg at 11/29/17 1519    Past Med / Surg History:  No past medical history on file.  Past Surgical History:   Procedure Laterality Date     IN EXCIS THYROID DUCT CYST/SINUS      Description: Thyroglossal Duct Cyst Excision;  Recorded: 05/01/2012;       Fam / Soc History:  No family history on file.  Social History     Social History     Marital status: Single     Spouse name: N/A     Number of children: N/A     Years of education: N/A     Occupational History     Not on file.     Social History Main Topics     Smoking status: Never Smoker     Smokeless tobacco: Never Used     Alcohol use No     Drug use: No     Sexual activity: Not on file     Other Topics Concern     Not on file     Social History Narrative       ROS:  14 point review of systems unremarkable except as mentioned in HPI    Objective:  Vitals: /80  Pulse 77  Resp 16  SpO2 92%    Gen: Alert, awake, well appearing sitting in a wheelchair  HEENT: Posterior occipital aspect of scalp superficial abrasion without weeping, purulence or surrounding erythema.  Mouth:  MMM. Oropharynx clear. No tonsillar exudate. Teeth, gum, tongue and lips clear.  Neck:  Supple, FROM, No lymphandenpathy, No TM  Heart: Regular rate and rhythm; normal S1 and S2; no murmurs,  gallops, or rubs.  Lungs: Unlabored respirations; symmetric chest expansion; clear breath sounds.  Extremities: Left knee with suprapatellar.  No ectopy ptosis or abrasions of the knee.  Full range of motion of the knee with flexion and extension.  Some discomfort with full extension.  Generalized discomfort about the knee with palpation.  No obvious instability with examination.   Skin: 1+ edema and stasis dermatitis lower extremities bilaterally.  Mental Status: Alert, oriented, in no distress. Mood and affect appropriate.    Pertinent results / imaging:  Xr Knee Left 1 Or 2 Vws    Result Date: 1/9/2018  XR KNEE LEFT 1 OR 2 VWS 1/9/2018 8:10 PM INDICATION: Fall. Pain. COMPARISON: 5/19/2016. FINDINGS: No convincing fracture. No dislocation. At least moderate sized suprapatellar joint effusion is seen. Joint bodies also noted. Meniscal chondrocalcinosis and degenerative changes are present.       Assessment:  1. Sprain left knee superimposed on on chronic osteoarthritis 2. Scalp abrasion    Plan: Reviewed x-ray results with group home staff.  Advised rest, elevation, ice for 20 minutes three times daily,  regular acetaminophen 1000 mg every 6 hours as needed for discomfort and Ace compression wrap for comfort and swelling.  Off work program for the rest of this week.  Can return on Monday of next week if symptoms improve.  If continues to have difficulty with increased stiffness and problems ambulating follow-up with PCP in 7-10 days.  For scalp abrasion advised again daily with mild soap and warm water.  Applying topical antibiotic ointment twice daily until abrasion is healed.  Group home staff voiced understanding and was in agreement with plan.      Fransisca Marin MD  1/9/2018

## 2021-06-15 NOTE — PROGRESS NOTES
"Assessment / Impression     1. Bilateral impacted cerumen     2. Vitamin B12 deficiency     3. Abrasion of scalp     4. Screen for colon cancer  Cologuard   5. Trisomy 21 (Down Syndrome)         Plan:     I removed a large amount of cerumen from his ear canals myself using the lighted spatula. He tolerated the procedure well.  I recommended he follow-up in 1 month to reevaluate this.  He was given his vitamin B12 injection today.  He may stop using the triple antibiotic ointment for the scalp abrasion which has essentially healed.  I ordered the cologuard test for colon cancer screening purposes.    Subjective:      HPI: Christian Bobby is a 57 y.o. male who presents to the clinic for follow-up.  He has a history of cerumen impaction and needs to have the cerumen removed every 1-2 months.  A staff person from his group home is present and reports that the wax buildup has become much worse over the past few weeks.  They have noticed diminished hearing because of this.  He wears hearing aids in both ears.  He has regular audiology appointments.  He also has a history of vitamin B12 deficiency and is due for his next injection.  He recently fell when visiting his parents and sustained an abrasion on the top of his head.  They have been using a triple antibiotic ointment which has been helpful.    Review of Systems  All other systems reviewed and are negative.     History   Smoking Status     Never Smoker   Smokeless Tobacco     Never Used       No family history on file.    Objective:     /66 (Patient Site: Right Arm, Patient Position: Sitting, Cuff Size: Adult Large)  Pulse 72  Temp 99  F (37.2  C) (Oral)   Resp 16  Ht 5' 5\" (1.651 m)  Wt (!) 269 lb 2 oz (122.1 kg)  BMI 44.78 kg/m2  Physical Examination: General appearance - alert, well appearing, and in no distress  Head: There is trace erythema over the top of his scalp which is not significantly tender.  The surrounding skin appears normal  Eyes:  " extraocular eye movements intact  Ears: Bilateral canals are impacted with cerumen.  Once this was removed the tympanic membranes appeared clear.  Neck: No lymphadenopathy  Heart: Regular rate and rhythm  Lungs: Clear to auscultation bilaterally.  Extremities: No effusions in the knees.  He is moderately tender over the medial joint spaces bilaterally.     No results found for this or any previous visit (from the past 168 hour(s)).    Current Outpatient Prescriptions   Medication Sig Note     allopurinol (ZYLOPRIM) 100 MG tablet Take 100 mg by mouth daily.      artificial tears,hypromellose, (GENTEAL; SYSTANE) 0.3 % Gel Administer to both eyes.      disposable gloves Misc Extra large and large non latex gloves for medication application, use by staff applying eye gtts given 8 times a day and eye ointment BID x 10 days.  #20 gloves daily.  #100 extra-large and #100 large.      erythromycin ophthalmic ointment apply (1CM)  by ophthalmic route 1 time every day ribbon into the lower conjunctival sac(s) in the affected eye(s) 2/3/2016: Received from: [facility]     latanoprost (XALATAN) 0.005 % ophthalmic solution Administer 1 drop to the right eye bedtime.      levothyroxine (SYNTHROID, LEVOTHROID) 75 MCG tablet Take 75 mcg by mouth daily.      saliva stimulant (BIOTENE ORALBALANCE) Gel gel by Mucous Membrane route as needed.      triamcinolone (KENALOG) 0.1 % cream Apply to affected area once daily as needed for dry skin

## 2021-06-16 NOTE — PROGRESS NOTES
"Assessment / Impression     1. Bilateral impacted cerumen     2. Vitamin B12 deficiency     3. Headache  CT Head Without Contrast   4. Head injury  CT Head Without Contrast       Plan:     I removed a large amount of cerumen from his ear canals myself using the lighted spatula. He tolerated the procedure well.  I recommended he follow-up in 1 month to reevaluate this.  He was given his vitamin B12 injection today.  I recommended we check a CT scan of the head given the history of trauma and his frequent headaches that have been occurring ever since.  I recommended they keep a headache journal to help identify triggers.  He may continue Tylenol as needed for this.    Subjective:      HPI: Christian Bobby is a 57 y.o. male who presents to the clinic for follow-up.  He has a history of cerumen impaction and needs to have the cerumen removed every 1-2 months.  A staff person from his group home is present and reports that the wax buildup has become much worse over the past few weeks.  They have noticed diminished hearing because of this.  He wears hearing aids in both ears.  He has regular audiology appointments.  He also has a history of vitamin B12 deficiency and is due for his next injection.  He fell when visiting his parents in January and sustained an abrasion on the top of his head.  The abrasion has healed, but he has been having headaches off and on ever since.  These typically occur at night a few times a week.  Tylenol helps.  They have not noticed any changes in behavior.     Review of Systems  All other systems reviewed and are negative.     History   Smoking Status     Never Smoker   Smokeless Tobacco     Never Used       No family history on file.    Objective:     /64 (Patient Site: Left Arm, Patient Position: Sitting, Cuff Size: Adult Large)  Pulse 72  Temp 98.2  F (36.8  C) (Oral)   Resp 16  Ht 5' 5\" (1.651 m)  Wt (!) 270 lb 6 oz (122.6 kg)  BMI 44.99 kg/m2  Physical Examination: General " appearance - alert, well appearing, and in no distress  Head: The scalp is not significantly tender.  No evidence of trauma to the scalp on today's exam.    Eyes:  extraocular eye movements intact  Ears: Bilateral canals are impacted with cerumen.  Once this was removed the tympanic membranes appeared clear.  Neck: No lymphadenopathy  Heart: Regular rate and rhythm  Lungs: Clear to auscultation bilaterally.  Extremities: No effusions in the knees.  He is moderately tender over the medial joint spaces bilaterally.     No results found for this or any previous visit (from the past 168 hour(s)).    Current Outpatient Prescriptions   Medication Sig Note     allopurinol (ZYLOPRIM) 100 MG tablet TAKE 1 TABLET BY MOUTH ONCE DAILY.      artificial tears,hypromellose, (GENTEAL; SYSTANE) 0.3 % Gel Administer to both eyes.      disposable gloves Misc Extra large and large non latex gloves for medication application, use by staff applying eye gtts given 8 times a day and eye ointment BID x 10 days.  #20 gloves daily.  #100 extra-large and #100 large.      erythromycin ophthalmic ointment apply (1CM)  by ophthalmic route 1 time every day ribbon into the lower conjunctival sac(s) in the affected eye(s) 2/3/2016: Received from: [facility]     latanoprost (XALATAN) 0.005 % ophthalmic solution Administer 1 drop to the right eye bedtime.      levothyroxine (SYNTHROID, LEVOTHROID) 75 MCG tablet TAKE 1 TABLET BY MOUTH ONCE DAILY.      saliva stimulant (BIOTENE ORALBALANCE) Gel gel by Mucous Membrane route as needed.      triamcinolone (KENALOG) 0.1 % cream Apply to affected area once daily as needed for dry skin

## 2021-06-16 NOTE — PROGRESS NOTES
"Assessment / Impression     1. Skin lesion of back     2. Dry skin     3. Fall           Plan:     The skin lesion on his back could be a healing abrasion from a recent fall or possibly inflamed eczema/dry skin.  I recommended they apply an antibiotic ointment twice daily until the redness improves.  We discussed using moisturizers to help with his dry skin symptoms.  If his symptoms do not improve he will return to the clinic.  Reviewed the recent head CT scan results which showed no acute abnormalities.    Subjective:      HPI: Christian Bobby is a 57 y.o. male who presents to the clinic to have a skin lesion on his back evaluated.  Home staff members present and provides of the history.  They noticed this about 4-5 days ago.  They have been putting topical steroid on it and symptoms seem to be improving.  He is not having fevers.  He sustained a fall 2 months ago.  He had an abrasion on his scalp after that.  He still has some headaches.  He recently had a CT scan of the brain which did not show any acute abnormalities.        Review of Systems  All other systems reviewed and are negative.     History   Smoking Status     Never Smoker   Smokeless Tobacco     Never Used       No family history on file.    Objective:     /62 (Patient Site: Left Arm, Patient Position: Sitting, Cuff Size: Adult Large)  Pulse 72  Temp 98.4  F (36.9  C) (Oral)   Resp 16  Ht 5' 5\" (1.651 m)  Wt (!) 272 lb 9 oz (123.6 kg)  BMI 45.36 kg/m2  Physical Examination: General appearance - alert, well appearing, and in no distress  Mouth: mucous membranes moist  Neck: supple, no significant adenopathy.  Nontender over the cervical and upper thoracic spine  Skin: There is an erythematous abrasion with peeling skin on his back that measures 6 x 5 cm.    No results found for this or any previous visit (from the past 168 hour(s)).    Current Outpatient Prescriptions   Medication Sig Note     allopurinol (ZYLOPRIM) 100 MG tablet TAKE 1 " TABLET BY MOUTH ONCE DAILY.      artificial tears,hypromellose, (GENTEAL; SYSTANE) 0.3 % Gel Administer to both eyes.      disposable gloves Misc Extra large and large non latex gloves for medication application, use by staff applying eye gtts given 8 times a day and eye ointment BID x 10 days.  #20 gloves daily.  #100 extra-large and #100 large.      erythromycin ophthalmic ointment apply (1CM)  by ophthalmic route 1 time every day ribbon into the lower conjunctival sac(s) in the affected eye(s) 2/3/2016: Received from: [facility]     latanoprost (XALATAN) 0.005 % ophthalmic solution Administer 1 drop to the right eye bedtime.      levothyroxine (SYNTHROID, LEVOTHROID) 75 MCG tablet TAKE 1 TABLET BY MOUTH ONCE DAILY.      saliva stimulant (BIOTENE ORALBALANCE) Gel gel by Mucous Membrane route as needed.      triamcinolone (KENALOG) 0.1 % cream Apply to affected area once daily as needed for dry skin

## 2021-06-16 NOTE — PROGRESS NOTES
Subjective findings: Mr. Bobby returned to the clinic today for continued foot care complaining of    long thick nails on both feet. Also complaining of a callus right foot.    OBJECTIVE FINDINGS: Elongated nails 1 through 5 both feet. All nails are 2    times normal thickness with the exception of left great toenail, which is 3    to 4 times normal thickness. Skin bilaterally is cool, dry, and scaly. There is a thick hyperkeratotic    lesion medial aspect first metatarsal phalangeal joint right foot. DP    and PT pulses +1/4 bilaterally. Capillary refill less than 2 seconds    bilaterally. Negative clonus. Negative Babinski bilaterally. Range of    motion within normal limits bilaterally. Muscle power +5/5 bilaterally    within all compartments.      ASSESSMENT: Onychomycosis onychauxis, tyloma.     PLAN: Debrided nails 1 through 5 both feet.  Also debrided the hyperkeratotic lesion of the right foot.  I have recommended    that Mr. Bobby return to clinic every 2 to 3 months for continued foot care

## 2021-06-17 NOTE — PROGRESS NOTES
"Assessment / Impression     1. Bilateral impacted cerumen     2. Vitamin B12 deficiency     3. Screen for colon cancer         Plan:     I removed a large amount of cerumen from his ear canals myself using the lighted spatula. He tolerated the procedure well.  I recommended he follow-up in 1 month to reevaluate this.  He was given his vitamin B12 injection today.  He was given fecal occult cards for colon cancer screening purposes.     Subjective:      HPI: Christian Bobby is a 57 y.o. male who presents to the clinic for follow-up.  He has a history of cerumen impaction and needs to have the cerumen removed every 1-2 months.  A staff person from his group home is present and reports that the wax buildup has become much worse over the past few weeks.  They have noticed diminished hearing because of this.  He wears hearing aids in both ears.  He has regular audiology appointments.  He also has a history of vitamin B12 deficiency and is due for his next injection.  They have not been able to get a stool sample for the cologuard test.  They are wondering what other options may be available.    Review of Systems  All other systems reviewed and are negative.     History   Smoking Status     Never Smoker   Smokeless Tobacco     Never Used       No family history on file.    Objective:     /72 (Patient Site: Left Arm, Patient Position: Sitting, Cuff Size: Adult Large)  Pulse 72  Temp 98.3  F (36.8  C) (Oral)   Resp 16  Ht 5' 5\" (1.651 m)  Wt (!) 272 lb 2 oz (123.4 kg)  BMI 45.28 kg/m2  Physical Examination: General appearance - alert, well appearing, and in no distress  Eyes:  extraocular eye movements intact  Ears: Bilateral canals are impacted with cerumen.  Once this was removed the tympanic membranes appeared clear.  Neck: No lymphadenopathy  Heart: Regular rate and rhythm  Lungs: Clear to auscultation bilaterally.    No results found for this or any previous visit (from the past 168 hour(s)).    Current " Outpatient Prescriptions   Medication Sig Note     allopurinol (ZYLOPRIM) 100 MG tablet TAKE 1 TABLET BY MOUTH ONCE DAILY.      artificial tears,hypromellose, (GENTEAL; SYSTANE) 0.3 % Gel Administer to both eyes.      disposable gloves Misc Extra large and large non latex gloves for medication application, use by staff applying eye gtts given 8 times a day and eye ointment BID x 10 days.  #20 gloves daily.  #100 extra-large and #100 large.      erythromycin ophthalmic ointment apply (1CM)  by ophthalmic route 1 time every day ribbon into the lower conjunctival sac(s) in the affected eye(s) 2/3/2016: Received from: [facility]     latanoprost (XALATAN) 0.005 % ophthalmic solution Administer 1 drop to the right eye bedtime.      levothyroxine (SYNTHROID, LEVOTHROID) 75 MCG tablet TAKE 1 TABLET BY MOUTH ONCE DAILY.      saliva stimulant (BIOTENE ORALBALANCE) Gel gel by Mucous Membrane route as needed.      triamcinolone (KENALOG) 0.1 % cream Apply to affected area once daily as needed for dry skin

## 2021-06-17 NOTE — PATIENT INSTRUCTIONS - HE
Patient Instructions by Clifford Wooten DO at 12/30/2019  3:40 PM     Author: Clifford Wooten DO Service: -- Author Type: Physician    Filed: 12/30/2019  4:28 PM Encounter Date: 12/30/2019 Status: Signed    : Clifford Wooten DO (Physician)         Patient Education     Exercise for a Healthier Heart  You may wonder how you can improve the health of your heart. If youre thinking about exercise, youre on the right track. You dont need to become an athlete, but you do need a certain amount of brisk exercise to help strengthen your heart. If you have been diagnosed with a heart condition, your doctor may recommend exercise to help stabilize your condition. To help make exercise a habit, choose safe, fun activities.       Be sure to check with your health care provider before starting an exercise program.    Why exercise?  Exercising regularly offers many healthy rewards. It can help you do all of the following:    Improve your blood cholesterol levels to help prevent further heart trouble    Lower your blood pressure to help prevent a stroke or heart attack    Control diabetes, or reduce your risk of getting this disease    Improve your heart and lung function    Reach and maintain a healthy weight    Make your muscles stronger and more limber so you can stay active    Prevent falls and fractures by slowing the loss of bone mass (osteoporosis)    Manage stress better  Exercise tips  Ease into your routine. Set small goals. Then build on them.  Exercise on most days. Aim for a total of 150 or more minutes of moderate to  vigorous intensity activity each week. Consider 40 minutes, 3 to 4 times a week. For best results, activity should last for 40 minutes on average. It is OK to work up to the 40 minute period over time. Examples of moderate-intensity activity is walking one mile in 15 minutes or 30 to 45 minutes of yard work.  Step up your daily activity level. Along with  your exercise program, try being more active throughout the day. Walk instead of drive. Do more household tasks or yard work.  Choose one or more activities you enjoy. Walking is one of the easiest things you can do. You can also try swimming, riding a bike, or taking an exercise class.  Stop exercising and call your doctor if you:    Have chest pain or feel dizzy or lightheaded    Feel burning, tightness, pressure, or heaviness in your chest, neck, shoulders, back, or arms    Have unusual shortness of breath    Have increased joint or muscle pain    Have palpitations or an irregular heartbeat      5326-0688 [employer]. [address] [place] [address]. All rights reserved. This information is not intended as a substitute for professional medical care. Always follow your healthcare professional's instructions.         Patient Education   Understanding [employer] [facility] MyPlate  The USDA (US Department of Agriculture) has guidelines to help you make healthy food choices. These are called MyPlate. MyPlate shows the food groups that make up healthy meals using the image of a place setting. Before you eat, think about the healthiest choices for what to put onto your plate or into your cup or bowl. To learn more about building a healthy plate, visit www.choosemyplate.gov.       The Food Groups    Fruits: Any fruit or 100% fruit juice counts as part of the Fruit Group. Fruits may be fresh, canned, frozen, or dried, and may be whole, cut-up, or pureed. Make half your plate fruits and vegetables.    Vegetables: Any vegetable or 100% vegetable juice counts as a member of the Vegetable Group. Vegetables may be fresh, frozen, canned, or dried. They can be served raw or cooked and may be whole, cut-up, or mashed. Make half your plate fruits and vegetables.     Grains: All foods made from grains are part of the Grains Group. These include wheat, rice, oats, cornmeal, and barley such as bread, pasta, oatmeal, cereal,  tortillas, and grits. Grains should be no more than a quarter of your plate. At least half of your grains should be whole grains.    Protein: This group includes meat, poultry, seafood, beans and peas, eggs, processed soy products (like tofu), nuts (including nut butters), and seeds. Make protein choices no more than a quarter of your plate. Meat and poultry choices should be lean or low fat.    Dairy: All fluid milk products and foods made from milk that contain calcium, like yogurt and cheese are part of the Dairy Group. (Foods that have little calcium, such as cream, butter, and cream cheese, are not part of the group.) Most dairy choices should be low-fat or fat-free.    Oils: These are fats that are liquid at room temperature. They include canola, corn, olive, soybean, and sunflower oil. Foods that are mainly oil include mayonnaise, certain salad dressings, and soft margarines. You should have only 5 to 7 teaspoons of oils a day. You probably already get this much from the food you eat.  Use [employer] to Help Build Your Meals  The [employer] can help you plan and track your meals and activity. You can look up individual foods to see or compare their nutritional value. You can get guidelines for what and how much you should eat. You can compare your food choices. And you can assess personal physical activities and see ways you can improve. Go to www.[employer].gov/supertracker/.    9403-8084 [employer]. [address] [place] [address], [place] [id number]. All rights reserved. This information is not intended as a substitute for professional medical care. Always follow your healthcare professional's instructions.           Patient Education   Activities of Daily Living  Your Health Risk Assessment indicates you have difficulties with activities of daily living such as eating, getting dressed, grooming, bathing, walking, or using the toilet. Please make a follow up appointment for us to address this  issue in more detail.     Patient Education   Instrumental Activities of Daily Living  Your Health Risk Assessment indicates you have difficulties with instrumental activities of daily living which include laundry, housekeeping, banking, shopping, using the telephone, food preparation, transportation, or taking your own medications. Please make a follow up appointment for us to address this issue in more detail.    [employer] has resources available on the following website: [email or web address]     Also, here is a local agency that provides help with meals and other assistance:   [facility] Line: [phone]     Patient Education   Signs of Hearing Loss  Hearing loss is a problem shared by many people. In fact, it is one of the most common health conditions, particularly as people age. Most people over age 65 have some hearing loss, and by age 80, almost everyone does. Because hearing loss usually occurs slowly over the years, you may not realize your hearing ability has gotten worse.       Have your hearing checked  Contact your [facility] care provider if you:    Have to strain to hear normal conversation.    Have to watch other peoples faces very carefully to follow what theyre saying.    Need to ask people to repeat what theyve said.    Often misunderstand what people are saying.    Turn the volume of the television or radio up so high that others complain.    Feel that people are mumbling when theyre talking to you.    Find that the effort to hear leaves you feeling tired and irritated.    Notice, when using the phone, that you hear better with 1 ear than the other.    4687-4481 The [employer]. [address] [place] [address], [place] [address]. All rights reserved. This information is not intended as a substitute for professional medical care. Always follow your healthcare professional's instructions.         Patient Education   Preventing Falls in the Home  As you get older, falls are  more likely. Thats because your reaction time slows. Your muscles and joints may also get stiffer, making them less flexible. Illness, medications, and vision changes can also affect your balance. A fall could leave you unable to live on your own. To make your home safer, follow these tips:    Floors    Put nonskid pads under area rugs.    Remove throw rugs.    Replace worn floor coverings.    Tack carpets firmly to each step on carpeted stairs. Put nonskid strips on the edges of uncarpeted stairs.    Keep floors and stairs free of clutter and cords.    Arrange furniture so there are clear pathways.    Clean up any spills right away.    Bathrooms    Install grab bars in the tub or shower.    Apply nonskid strips or put a nonskid rubber mat in the tub or shower.    Sit on a bath chair to bathe.    Use bathmats with nonskid backing.    Lighting    Keep a flashlight in each room.    Put a nightlight along the pathway between the bedroom and the bathroom.    [phone] [employer]. [address] [place] [address]. All rights reserved. This information is not intended as a substitute for professional medical care. Always follow your healthcare professional's instructions.         Patient Education   Understanding Advance Care Planning  Advance care planning is the process of deciding ones own future medical care. It helps ensure that if you cant speak for yourself, your wishes can still be carried out. The plan is a series of legal documents that note a persons wishes. The documents vary by state. Advance care planning may be done when a person has a serious illness that is expected to get worse. It may be done before major surgery. And it can help you and your family be prepared in case of a major illness or injury. Advance care planning helps with making decisions at these times.       A health care proxy is a person who acts as the voice of a patient when the patient cant speak for himself or  herself. The name of this role varies by state. It may be called a Durable Medical Power of  or Durable Power of  for Healthcare. It may be called an agent, surrogate, or advocate. Or it may be called a representative or decision maker. It is an official duty that is identified by a legal document. The document also varies by state.    Why Is Advance Care Planning Important?  If a person communicates their healthcare wishes:    They will be given medical care that matches their values and goals.    Their family members will not be forced to make decisions in a crisis with no guidance.  Creating a Plan  Making an advance care plan is often done in 3 steps:    Thinking about ones wishes. To create an advance care plan, you should think about what kind of medical treatment you would want if you lose the ability to communicate. Are there any situations in which you would refuse or stop treatment? Are there therapies you would want or not want? And whom do you want to make decisions for you? There are many places to learn more about how to plan for your care. Ask your doctor or  for resources.    Picking a health care proxy. This means choosing a trusted person to speak for you only when you cant speak for yourself. When you cannot make medical decisions, your proxy makes sure the instructions in your advance care plan are followed. A proxy does not make decisions based on his or her own opinions. They must put aside those opinions and values if needed, and carry out your wishes.    Filling out the legal documents. There are several kinds of legal documents for advance care planning. Each one tells health care providers your wishes. The documents may vary by state. They must be signed and may need to be witnessed or notarized. You can cancel or change them whenever you wish. Depending on your state, the documents may include a Healthcare Proxy form, Living Will, Durable Medical Power of  , Advance Directive, or others.  The Familys Role  The best help a family can give is to support their loved ones wishes. Open and honest communication is vital. Family should express any concerns they have about the patients choices while the patient can still make decisions.    0069-5396 [employer]. [address] [place] [address]. All rights reserved. This information is not intended as a substitute for professional medical care. Always follow your healthcare professional's instructions.         Also, [facility] offers a free, downloadable health care directive that allows you to share your treatment choices and personal preferences if you cannot communicate your wishes. It also allows you to appoint another person (called a health care agent) to make health care decisions if you are unable to do so. You can download an advance directive by going here: [email or web address]     Patient Education   Personalized Prevention Plan  You are due for the preventive services outlined below.  Your care team is available to assist you in scheduling these services.  If you have already completed any of these items, please share that information with your care team to update in your medical record.  Health Maintenance   Topic Date Due   ? HEPATITIS C SCREENING  1960   ? HIV SCREENING  09/04/1977   ? ZOSTER VACCINES (2 of 3) 01/31/2017   ? MEDICARE ANNUAL WELLNESS VISIT  12/19/2019   ? FECAL OCCULT BLOOD/FIT ANNUAL COLON CANCER SCREENING  05/24/2020   ? TDAP ADULT ONE TIME DOSE  08/26/2020   ? LIPID  12/11/2022   ? ADVANCE CARE PLANNING  10/08/2024   ? INFLUENZA VACCINE RULE BASED  Completed   ? TD 18+ HE  Discontinued

## 2021-06-17 NOTE — PROGRESS NOTES
"    Assessment & Plan     Bilateral impacted cerumen    Vitamin B12 deficiency (non anemic)    Trisomy 21, Down syndrome    I personally removed the impacted cerumen from his ear canals myself using the lighted curette.  He tolerated the procedure well.  He may follow-up in 1-2 months for reevaluation.  He was given his vitamin B12 injection today.  He will continue to follow-up with audiology as scheduled               Return in about 4 weeks (around 7/19/2021) for Follow up cerumen impaction and B12 deficiency.    [facility] is a 60 year old who presents for the following health issues accompanied by his home care worker:    RIVERA Bobby is a 60 y.o. male with a history of Down's syndrome who presents to the clinic for follow-up. He has a history of cerumen impaction and needs to have the cerumen removed every 1-2 months. A staff person from his group home is present and reports that the wax buildup seems worse over the past few weeks. He wears hearing aids and has regular audiology appointments every few months. He is not complaining of pain or drainage in his ears. His energy level has been good. He also has a history of vitamin B12 deficiency and is due for his next injection.      Concern - Cerumen Impaction  Onset: ongoing  Description: cerumen impaction recheck - bilateral ears  Intensity: mild  Progression of Symptoms:  waxing and waning  Accompanying Signs & Symptoms: none  Previous history of similar problem: yes  Precipitating factors:        Worsened by: none  Alleviating factors:        Improved by: none  Therapies tried and outcome:  none         Review of Systems         Objective    /74 (Patient Position: Sitting, Cuff Size: Adult Large)   Pulse 56   Temp 97  F (36.1  C) (Tympanic)   Resp 20   Ht 1.702 m (5' 7\")   Wt 114.1 kg (251 lb 8 oz)   SpO2 99%   BMI 39.39 kg/m    Body mass index is 39.39 kg/m .  Physical " Exam     GENERAL: healthy, alert and no distress  EYES: Eyes grossly normal to inspection, PERRL and conjunctivae and sclerae normal  HENT: Both canals are impacted with cerumen.  Once this was removed the canals and tympanic membranes appeared clear.  NECK: no adenopathy, no asymmetry, masses, or scars and thyroid normal to palpation  RESP: lungs clear to auscultation - no rales, rhonchi or wheezes  CV: regular rate and rhythm, normal S1 S2, no S3 or S4, no murmur, click or rub, no peripheral edema and peripheral pulses strong  PSYCH: mentation appears normal, affect normal/bright

## 2021-06-17 NOTE — PROGRESS NOTES
"Assessment / Impression     1. Bilateral impacted cerumen     2. Vitamin B12 deficiency         Plan:     I removed a large amount of cerumen from his ear canals myself using the lighted spatula. He tolerated the procedure well.  I recommended he follow-up in 1 month to reevaluate this.  He was given his vitamin B12 injection today.       Subjective:      HPI: Christian Bobby is a 57 y.o. male who presents to the clinic for follow-up.  He has a history of cerumen impaction and needs to have the cerumen removed every 1-2 months.  A staff person from his group home is present and reports that the wax buildup has become much worse over the past few weeks.  They have noticed diminished hearing because of this.  He wears hearing aids in both ears.  He has regular audiology appointments.  He also has a history of vitamin B12 deficiency and is due for his next injection.  They have not been able to get a stool sample for the cologuard test.  They are wondering what other options may be available.    Review of Systems  All other systems reviewed and are negative.     History   Smoking Status     Never Smoker   Smokeless Tobacco     Never Used       No family history on file.    Objective:     /64 (Patient Site: Right Arm, Patient Position: Sitting, Cuff Size: Adult Large)  Pulse 64  Temp 98.8  F (37.1  C) (Oral)   Resp 16  Ht 5' 5\" (1.651 m)  Wt (!) 269 lb 9 oz (122.3 kg)  BMI 44.86 kg/m2  Physical Examination: General appearance - alert, well appearing, and in no distress  Eyes:  extraocular eye movements intact  Ears: Bilateral canals are impacted with cerumen.  Once this was removed the tympanic membranes appeared clear.  Neck: No lymphadenopathy  Heart: Regular rate and rhythm  Lungs: Clear to auscultation bilaterally.    No results found for this or any previous visit (from the past 168 hour(s)).    Current Outpatient Prescriptions   Medication Sig Note     allopurinol (ZYLOPRIM) 100 MG tablet TAKE 1 TABLET " BY MOUTH ONCE DAILY.      artificial tears,hypromellose, (GENTEAL; SYSTANE) 0.3 % Gel Administer to both eyes.      disposable gloves Misc Extra large and large non latex gloves for medication application, use by staff applying eye gtts given 8 times a day and eye ointment BID x 10 days.  #20 gloves daily.  #100 extra-large and #100 large.      erythromycin ophthalmic ointment apply (1CM)  by ophthalmic route 1 time every day ribbon into the lower conjunctival sac(s) in the affected eye(s) 2/3/2016: Received from: [employer] [facility]     latanoprost (XALATAN) 0.005 % ophthalmic solution Administer 1 drop to the right eye bedtime.      levothyroxine (SYNTHROID, LEVOTHROID) 75 MCG tablet TAKE 1 TABLET BY MOUTH ONCE DAILY.      saliva stimulant (BIOTENE ORALBALANCE) Gel gel by Mucous Membrane route as needed.      triamcinolone (KENALOG) 0.1 % cream Apply to affected area once daily as needed for dry skin 4/25/2018: prn

## 2021-06-18 ENCOUNTER — RECORDS - HEALTHEAST (OUTPATIENT)
Dept: ADMINISTRATIVE | Facility: OTHER | Age: 61
End: 2021-06-18

## 2021-06-18 NOTE — PROGRESS NOTES
"Assessment / Impression     1. Bilateral impacted cerumen     2. Vitamin B12 deficiency     3. Trisomy 21 (Down Syndrome)         Plan:     I removed a large amount of cerumen from his ear canals myself using the lighted spatula. He tolerated the procedure well.  I recommended he follow-up in 1 month to reevaluate this.  He was given his vitamin B12 injection today.       Subjective:      HPI: Christian Bobby is a 57 y.o. male with a history of Down's syndrome who presents to the clinic for follow-up.  He has a history of cerumen impaction and needs to have the cerumen removed every 1-2 months.  A staff person from his group home is present and reports that the wax buildup has become much worse over the past few weeks.  They have noticed diminished hearing because of this.  He wears hearing aids in both ears.  He has regular audiology appointments.  He also has a history of vitamin B12 deficiency and is due for his next injection.  They have not been able to get a stool sample for the cologuard test.  They are wondering what other options may be available.    Review of Systems  All other systems reviewed and are negative.     History   Smoking Status     Never Smoker   Smokeless Tobacco     Never Used       No family history on file.    Objective:     /64 (Patient Site: Right Arm, Patient Position: Sitting, Cuff Size: Adult Large)  Pulse 64  Temp 98.7  F (37.1  C) (Oral)   Resp 16  Ht 5' 5\" (1.651 m)  Wt (!) 271 lb 2 oz (123 kg)  BMI 45.12 kg/m2  Physical Examination: General appearance - alert, well appearing, and in no distress  Eyes:  extraocular eye movements intact  Ears: Bilateral canals are impacted with cerumen.  Once this was removed the tympanic membranes appeared clear.  Neck: No lymphadenopathy  Heart: Regular rate and rhythm  Lungs: Clear to auscultation bilaterally.    No results found for this or any previous visit (from the past 168 hour(s)).    Current Outpatient Prescriptions   Medication " Sig Note     allopurinol (ZYLOPRIM) 100 MG tablet TAKE 1 TABLET BY MOUTH ONCE DAILY.      artificial tears,hypromellose, (GENTEAL; SYSTANE) 0.3 % Gel Administer to both eyes.      disposable gloves Misc Extra large and large non latex gloves for medication application, use by staff applying eye gtts given 8 times a day and eye ointment BID x 10 days.  #20 gloves daily.  #100 extra-large and #100 large.      erythromycin ophthalmic ointment apply (1CM)  by ophthalmic route 1 time every day ribbon into the lower conjunctival sac(s) in the affected eye(s) 2/3/2016: Received from: [employer]     latanoprost (XALATAN) 0.005 % ophthalmic solution Administer 1 drop to the right eye bedtime.      levothyroxine (SYNTHROID, LEVOTHROID) 75 MCG tablet TAKE 1 TABLET BY MOUTH ONCE DAILY.      saliva stimulant (BIOTENE ORALBALANCE) Gel gel by Mucous Membrane route as needed.      triamcinolone (KENALOG) 0.1 % cream Apply to affected area once daily as needed for dry skin 4/25/2018: prn

## 2021-06-18 NOTE — PROGRESS NOTES
"Assessment / Impression     1. Bilateral impacted cerumen     2. Vitamin B12 deficiency     3. Left wrist pain     4. Trisomy 21 (Down Syndrome)     5. Corneal disorder  disposable gloves Misc       Plan:     I removed a large amount of cerumen from his ear canals myself using the lighted spatula. He tolerated the procedure well.  I recommended he follow-up in 1 month to reevaluate this.  He was given his vitamin B12 injection today.  The underlying cause of his left hand/wrist pain is unclear.  The staff person who is with him today states that he has not been complaining of this and was using his left hand/arm without difficulty even on the way to the appointment today.  When I palpated these areas while he was distracted he did not grimace or seem uncomfortable.  Furthermore, there is no sign of trauma or asymmetry at this time.  I recommended that he simply monitor symptoms for now and schedule a follow-up appointment if he continues to have pain in this area.    Subjective:      HPI: Christian Bobby is a 57 y.o. male with a history of Down's syndrome who presents to the clinic for follow-up.  He has a history of cerumen impaction and needs to have the cerumen removed every 1-2 months.  A staff person from his group home is present and reports that the wax buildup has become worse over the past few weeks.  They have noticed diminished hearing because of this.  He wears hearing aids in both ears and has regular audiology appointments.  He also has a history of vitamin B12 deficiency and is due for his next injection.      Review of Systems  All other systems reviewed and are negative.     History   Smoking Status     Never Smoker   Smokeless Tobacco     Never Used       No family history on file.    Objective:     /64 (Patient Site: Right Arm, Patient Position: Sitting, Cuff Size: Adult Large)  Pulse 68  Temp 99.1  F (37.3  C) (Oral)   Resp 16  Ht 5' 5\" (1.651 m)  Wt (!) 264 lb (119.7 kg)  BMI 43.93 " kg/m2  Physical Examination: General appearance - alert, well appearing, and in no distress  Eyes:  extraocular eye movements intact  Ears: Bilateral canals are impacted with cerumen.  Once this was removed the tympanic membranes appeared clear.  Neck: No lymphadenopathy  Heart: Regular rate and rhythm  Lungs: Clear to auscultation bilaterally.  Extremities: No swelling, warmth or erythema.  Radial ulnar pulses 2 out of 4 bilaterally.  He is diffusely tender on initial examination of the left hand, wrist and forearm.  He is nontender in these areas during a distracted examination.    No results found for this or any previous visit (from the past 168 hour(s)).    Current Outpatient Prescriptions   Medication Sig Note     allopurinol (ZYLOPRIM) 100 MG tablet TAKE 1 TABLET BY MOUTH ONCE DAILY.      artificial tears,hypromellose, (GENTEAL; SYSTANE) 0.3 % Gel Administer to both eyes.      disposable gloves Misc Extra large and large non latex gloves for medication application, use by staff applying eye gtts given 8 times a day and eye ointment BID x 10 days.  #20 gloves daily.  #100 extra-large and #100 large.      erythromycin ophthalmic ointment apply (1CM)  by ophthalmic route 1 time every day ribbon into the lower conjunctival sac(s) in the affected eye(s) 2/3/2016: Received from: [facility]     latanoprost (XALATAN) 0.005 % ophthalmic solution Administer 1 drop to the right eye bedtime.      levothyroxine (SYNTHROID, LEVOTHROID) 75 MCG tablet TAKE 1 TABLET BY MOUTH ONCE DAILY.      saliva stimulant (BIOTENE ORALBALANCE) Gel gel by Mucous Membrane route as needed.      triamcinolone (KENALOG) 0.1 % cream Apply to affected area once daily as needed for dry skin 4/25/2018: prn

## 2021-06-19 NOTE — PROGRESS NOTES
Assessment / Impression     1. Bilateral impacted cerumen     2. Hearing difficulty of both ears     3. Vitamin B12 deficiency     4. Trisomy 21 (Down Syndrome)         Plan:     I removed a large amount of cerumen from his ear canals myself using the lighted spatula. He tolerated the procedure well.  I recommended he follow-up in 1 month to reevaluate this.  He was given his vitamin B12 injection today.  He will be following up with audiology as scheduled.  I recommended they speak with them about seeing if they may be able to arrange more frequent appointments since his hearing aids become so frequently blocked with cerumen.  They may need to write a letter requesting an appeal for this to his insurance.  It sounds like he would benefit from more frequent appointments since that a note significantly improved hearing after the hearing aids are cleaned.    Subjective:      HPI: Christian Bobby is a 57 y.o. male with a history of Down's syndrome who presents to the clinic for follow-up.  He has a history of cerumen impaction and needs to have the cerumen removed every 1-2 months.  A staff person from his group home is present and reports that the wax buildup has become worse over the past few weeks.  They have noticed diminished hearing because of this.  He wears hearing aids in both ears and has regular audiology appointments every 3 months.  The staff member notes that he would likely benefit from having more frequent audiology appointments because his hearing aids get blocked up from all of the cerumen.  Unfortunately, his insurance will not allow him to have more than 4 appointments per year.  He also has a history of vitamin B12 deficiency and is due for his next injection.      Review of Systems  All other systems reviewed and are negative.     History   Smoking Status     Never Smoker   Smokeless Tobacco     Never Used       No family history on file.    Objective:     /60 (Patient Site: Left Arm, Patient  "Position: Sitting, Cuff Size: Adult Large)  Pulse 72  Temp 97.9  F (36.6  C) (Oral)   Resp 16  Ht 5' 5\" (1.651 m)  Wt (!) 266 lb 6 oz (120.8 kg)  BMI 44.33 kg/m2  Physical Examination: General appearance - alert, well appearing, and in no distress  Eyes:  extraocular eye movements intact  Ears: Bilateral canals are impacted with cerumen.  Once this was removed the tympanic membranes appeared clear.  Neck: No lymphadenopathy  Heart: Regular rate and rhythm  Lungs: Clear to auscultation bilaterally.      No results found for this or any previous visit (from the past 168 hour(s)).    Current Outpatient Prescriptions   Medication Sig Note     allopurinol (ZYLOPRIM) 100 MG tablet TAKE 1 TABLET BY MOUTH ONCE DAILY.      artificial tears,hypromellose, (GENTEAL; SYSTANE) 0.3 % Gel Administer to both eyes.      disposable gloves Misc Extra large and large non latex gloves for medication application, use by staff applying eye gtts given 8 times a day and eye ointment BID x 10 days.  #20 gloves daily.  #100 extra-large and #100 large.      erythromycin ophthalmic ointment apply (1CM)  by ophthalmic route 1 time every day ribbon into the lower conjunctival sac(s) in the affected eye(s) 2/3/2016: Received from: [facility]     latanoprost (XALATAN) 0.005 % ophthalmic solution Administer 1 drop to the right eye bedtime.      levothyroxine (SYNTHROID, LEVOTHROID) 75 MCG tablet TAKE 1 TABLET BY MOUTH ONCE DAILY.      saliva stimulant (BIOTENE ORALBALANCE) Gel gel by Mucous Membrane route as needed.      triamcinolone (KENALOG) 0.1 % cream Apply to affected area once daily as needed for dry skin 4/25/2018: prn       "

## 2021-06-19 NOTE — LETTER
Letter by Clifford Wooten DO at      Author: Clifford Wooten DO Service: -- Author Type: --    Filed:  Encounter Date: 11/19/2019 Status: Signed         November 19, 2019     Patient: Christian Bobby   YOB: 1960   Date of Visit: 11/19/2019       To Whom It May Concern:    It is my medical opinion that Christian Bobby should have monthly audiology appointments to clean his hearing aids.  He has a medical history significant for hearing loss and significant cerumen impaction which builds up and diminishes the effectiveness of his hearing aids.    If you have any questions or concerns, please don't hesitate to call.    Sincerely,        Electronically signed by Clifford Padilla DO

## 2021-06-20 NOTE — LETTER
Letter by Clifford Wooten DO at      Author: Clifford Wooten DO Service: -- Author Type: --    Filed:  Encounter Date: 1/2/2020 Status: Signed         Christian Bobby  [address] [place] [address]             January 2, 2020         Dear Mr. Bobby,    Below are the results from your recent visit:    Resulted Orders   Basic Metabolic Panel   Result Value Ref Range    Sodium 141 136 - 145 mmol/L    Potassium 4.9 3.5 - 5.0 mmol/L    Chloride 103 98 - 107 mmol/L    CO2 27 22 - 31 mmol/L    Anion Gap, Calculation 11 5 - 18 mmol/L    Glucose 96 70 - 125 mg/dL    Calcium 9.1 8.5 - 10.5 mg/dL    BUN 16 8 - 22 mg/dL    Creatinine 0.88 0.70 - 1.30 mg/dL    GFR MDRD Af Amer >60 >60 mL/min/1.73m2    GFR MDRD Non Af Amer >60 >60 mL/min/1.73m2    Narrative    Fasting Glucose reference range is 70-99 mg/dL per  American Diabetes Association (ADA) guidelines.   Glycosylated Hemoglobin A1c   Result Value Ref Range    Hemoglobin A1c 5.5 3.5 - 6.0 %   HM2(CBC w/o Differential)   Result Value Ref Range    WBC 5.2 4.0 - 11.0 thou/uL    RBC 5.23 4.40 - 6.20 mill/uL    Hemoglobin 17.4 14.0 - 18.0 g/dL    Hematocrit 51.6 40.0 - 54.0 %    MCV 99 80 - 100 fL    MCH 33.3 27.0 - 34.0 pg    MCHC 33.7 32.0 - 36.0 g/dL    RDW 13.7 11.0 - 14.5 %    Platelets 212 140 - 440 thou/uL    MPV 10.0 8.5 - 12.5 fL   Lipid Cascade   Result Value Ref Range    Cholesterol 177 <=199 mg/dL    Triglycerides 59 <=149 mg/dL    HDL Cholesterol 68 >=40 mg/dL    LDL Calculated 97 <=129 mg/dL    Patient Fasting > 8hrs? No    Thyroid Cascade   Result Value Ref Range    TSH 1.63 0.30 - 5.00 uIU/mL   Uric Acid   Result Value Ref Range    Uric Acid 6.9 3.0 - 8.0 mg/dL   PSA (Prostatic-Specific Antigen), Annual Screen   Result Value Ref Range    PSA 0.2 0.0 - 3.5 ng/mL    Narrative    Method is Abbott Prostate-Specific Antigen (PSA)  Standard-WHO 1st International (90:10)   Vitamin B12   Result Value Ref Range    Vitamin B-12 >2,000 (H)  213 - 816 pg/mL   Rapid Strep A Screen-Throat   Result Value Ref Range    Rapid Strep A Antigen Group A Strep detected (!) No Group A Strep detected, presumptive negative       Hopefully Jhoan's strep throat symptoms resolved after taking the antibiotic. The rest of his lab results look great!    Please call with questions or contact us using [facility].    Sincerely,        Electronically signed by Clifford Padilla,

## 2021-06-20 NOTE — PROGRESS NOTES
"Assessment / Impression     1. Bilateral impacted cerumen     2. Hearing difficulty of both ears     3. Vitamin B12 deficiency     4. Trisomy 21 (Down Syndrome)         Plan:     I removed a large amount of cerumen from his ear canals myself using the lighted spatula. He tolerated the procedure well.  I recommended he follow-up in 1 month to reevaluate this.  He was given his vitamin B12 injection today and flu shot.  He will be following up with audiology as scheduled.    Subjective:      HPI: Christian Bobby is a 57 y.o. male with a history of Down's syndrome who presents to the clinic for follow-up.  He has a history of cerumen impaction and needs to have the cerumen removed every 1-2 months.  A staff person from his group home is present and reports that the wax buildup has become worse over the past few weeks.  They have noticed diminished hearing because of this.  He wears hearing aids in both ears and has regular audiology appointments every 3 months. He also has a history of vitamin B12 deficiency and is due for his next injection.      Review of Systems  All other systems reviewed and are negative.     History   Smoking Status     Never Smoker   Smokeless Tobacco     Never Used       No family history on file.    Objective:     /64 (Patient Site: Right Arm, Patient Position: Sitting, Cuff Size: Adult Large)  Pulse 68  Temp 98.7  F (37.1  C) (Oral)   Resp 16  Ht 5' 5\" (1.651 m)  Wt (!) 269 lb 7 oz (122.2 kg)  BMI 44.84 kg/m2  Physical Examination: General appearance - alert, well appearing, and in no distress  Eyes:  extraocular eye movements intact  Ears: Bilateral canals are impacted with cerumen.  Once this was removed the tympanic membranes appeared clear.  Neck: No lymphadenopathy  Heart: Regular rate and rhythm  Lungs: Clear to auscultation bilaterally.      No results found for this or any previous visit (from the past 168 hour(s)).    Current Outpatient Prescriptions   Medication Sig Note "     allopurinol (ZYLOPRIM) 100 MG tablet TAKE 1 TABLET BY MOUTH ONCE DAILY.      artificial tears,hypromellose, (GENTEAL; SYSTANE) 0.3 % Gel Administer to both eyes.      disposable gloves Misc Extra large and large non latex gloves for medication application, use by staff applying eye gtts given 8 times a day and eye ointment BID x 10 days.  #20 gloves daily.  #100 extra-large and #100 large.      erythromycin ophthalmic ointment apply (1CM)  by ophthalmic route 1 time every day ribbon into the lower conjunctival sac(s) in the affected eye(s) 2/3/2016: Received from: [email or web address] [facility]     latanoprost (XALATAN) 0.005 % ophthalmic solution Administer 1 drop to the right eye bedtime.      levothyroxine (SYNTHROID, LEVOTHROID) 75 MCG tablet TAKE 1 TABLET BY MOUTH ONCE DAILY.      saliva stimulant (BIOTENE ORALBALANCE) Gel gel by Mucous Membrane route as needed.      triamcinolone (KENALOG) 0.1 % cream Apply to affected area once daily as needed for dry skin 4/25/2018: prn

## 2021-06-20 NOTE — PROGRESS NOTES
Subjective findings: Mr. Bobby returned to the clinic today for continued foot care complaining of    long thick nails on both feet.  The patient is also complaining of a callus right foot.      OBJECTIVE FINDINGS: Elongated nails 1 through 5 both feet. All nails are 2    times normal thickness with the exception of left great toenail, which is 3    to 4 times normal thickness. Skin bilaterally is cool, dry, and scaly. There is a thick hyperkeratotic    lesion medial aspect first metatarsal phalangeal joint right foot. DP    and PT pulses +1/4 bilaterally. Capillary refill less than 2 seconds    bilaterally. Negative clonus. Negative Babinski bilaterally. Range of    motion within normal limits bilaterally. Muscle power +5/5 bilaterally    within all compartments.        ASSESSMENT: Onychomycosis onychauxis, tyloma.      PLAN: Debrided nails 1 through 5 both feet.  Also debrided the hyperkeratotic lesion of the right foot.

## 2021-06-20 NOTE — PROGRESS NOTES
"Assessment / Impression     1. Bilateral impacted cerumen     2. Hearing difficulty of both ears     3. Vitamin B12 deficiency     4. Trisomy 21 (Down Syndrome)         Plan:     I removed a large amount of cerumen from his ear canals myself using the lighted spatula. He tolerated the procedure well.  I recommended he follow-up in 1 month to reevaluate this.  He was given his vitamin B12 injection today.  He will be following up with audiology as scheduled.    Subjective:      HPI: Christian Bobby is a 58 y.o. male with a history of Down's syndrome who presents to the clinic for follow-up.  He has a history of cerumen impaction and needs to have the cerumen removed every 1-2 months.  A staff person from his group home is present and reports that the wax buildup has become worse over the past few weeks.  They have noticed diminished hearing because of this.  He wears hearing aids in both ears and has regular audiology appointments every 3 months. He also has a history of vitamin B12 deficiency and is due for his next injection.      Review of Systems  All other systems reviewed and are negative.     History   Smoking Status     Never Smoker   Smokeless Tobacco     Never Used       No family history on file.    Objective:     /70  Pulse 80  Resp 20  Ht 5' 5\" (1.651 m)  Wt (!) 266 lb (120.7 kg)  BMI 44.26 kg/m2  Physical Examination: General appearance - alert, well appearing, and in no distress  Eyes:  extraocular eye movements intact  Ears: Bilateral canals are impacted with cerumen.  Once this was removed the tympanic membranes appeared clear.  Neck: No lymphadenopathy  Heart: Regular rate and rhythm  Lungs: Clear to auscultation bilaterally.      No results found for this or any previous visit (from the past 168 hour(s)).    Current Outpatient Prescriptions   Medication Sig Note     allopurinol (ZYLOPRIM) 100 MG tablet TAKE 1 TABLET BY MOUTH ONCE DAILY.      artificial tears,hypromellose, (GENTEAL; " SYSTANE) 0.3 % Gel Administer 1 drop to both eyes 2 (two) times a day.      disposable gloves Misc Extra large and large non latex gloves for medication application, use by staff applying eye gtts given 8 times a day and eye ointment BID x 10 days.  #20 gloves daily.  #100 extra-large and #100 large.      erythromycin ophthalmic ointment apply (1CM)  by ophthalmic route 1 time every day ribbon into the lower conjunctival sac(s) in the affected eye(s) 2/3/2016: Received from: [facility]     latanoprost (XALATAN) 0.005 % ophthalmic solution Administer 1 drop to the right eye bedtime.      levothyroxine (SYNTHROID, LEVOTHROID) 75 MCG tablet TAKE 1 TABLET BY MOUTH ONCE DAILY.      saliva stimulant (BIOTENE ORALBALANCE) Gel gel by Mucous Membrane route as needed.      triamcinolone (KENALOG) 0.1 % cream Apply to affected area once daily as needed for dry skin 4/25/2018: prn

## 2021-06-21 ENCOUNTER — OFFICE VISIT (OUTPATIENT)
Dept: FAMILY MEDICINE | Facility: CLINIC | Age: 61
End: 2021-06-21
Payer: MEDICARE

## 2021-06-21 VITALS
HEART RATE: 56 BPM | WEIGHT: 251.5 LBS | BODY MASS INDEX: 39.47 KG/M2 | SYSTOLIC BLOOD PRESSURE: 110 MMHG | OXYGEN SATURATION: 99 % | DIASTOLIC BLOOD PRESSURE: 74 MMHG | TEMPERATURE: 97 F | HEIGHT: 67 IN | RESPIRATION RATE: 20 BRPM

## 2021-06-21 DIAGNOSIS — Q90.9 TRISOMY 21, DOWN SYNDROME: ICD-10-CM

## 2021-06-21 DIAGNOSIS — E53.8 VITAMIN B12 DEFICIENCY (NON ANEMIC): ICD-10-CM

## 2021-06-21 DIAGNOSIS — H61.23 BILATERAL IMPACTED CERUMEN: Primary | ICD-10-CM

## 2021-06-21 PROCEDURE — 99213 OFFICE O/P EST LOW 20 MIN: CPT | Mod: 25 | Performed by: FAMILY MEDICINE

## 2021-06-21 PROCEDURE — 69210 REMOVE IMPACTED EAR WAX UNI: CPT | Mod: 50 | Performed by: FAMILY MEDICINE

## 2021-06-21 PROCEDURE — 96372 THER/PROPH/DIAG INJ SC/IM: CPT | Mod: 59 | Performed by: FAMILY MEDICINE

## 2021-06-21 RX ADMIN — CYANOCOBALAMIN 1000 MCG: 1000 INJECTION, SOLUTION INTRAMUSCULAR; SUBCUTANEOUS at 11:20

## 2021-06-21 ASSESSMENT — MIFFLIN-ST. JEOR: SCORE: 1919.43

## 2021-06-21 NOTE — PROGRESS NOTES
"Assessment / Impression     1. Bilateral impacted cerumen     2. Vitamin B12 deficiency     3. Trisomy 21 (Down Syndrome)         Plan:     I removed the impacted cerumen from his ear canals myself using the lighted spatula. He tolerated the procedure well.  I recommended he follow-up in 1 month to reevaluate this.  He was given his vitamin B12 injection today.  He will be following up with audiology as scheduled.    Subjective:      HPI: Christian Bobby is a 58 y.o. male with a history of Down's syndrome who presents to the clinic for follow-up.  He has a history of cerumen impaction and needs to have the cerumen removed every 1-2 months.  A staff person from his group home is present and reports that the wax buildup has become worse over the past few weeks.  He wears hearing aids in both ears and has regular audiology appointments every 3 months. He also has a history of vitamin B12 deficiency and is due for his next injection.      Review of Systems  All other systems reviewed and are negative.     History   Smoking Status     Never Smoker   Smokeless Tobacco     Never Used       No family history on file.    Objective:     /64 (Patient Site: Left Arm, Patient Position: Sitting, Cuff Size: Adult Large)  Pulse 68  Temp 98.8  F (37.1  C) (Oral)   Resp 16  Ht 5' 5\" (1.651 m)  Wt (!) 264 lb 1 oz (119.8 kg)  BMI 43.94 kg/m2  Physical Examination: General appearance - alert, well appearing, and in no distress  Eyes:  extraocular eye movements intact  Ears: Bilateral canals are impacted with cerumen.  Once this was removed the tympanic membranes appeared clear.  Neck: No lymphadenopathy  Heart: Regular rate and rhythm  Lungs: Clear to auscultation bilaterally.      No results found for this or any previous visit (from the past 168 hour(s)).    Current Outpatient Prescriptions   Medication Sig Note     allopurinol (ZYLOPRIM) 100 MG tablet TAKE 1 TABLET BY MOUTH ONCE DAILY.      artificial tears,hypromellose, " (GENTEAL; SYSTANE) 0.3 % Gel Administer 1 drop to both eyes 2 (two) times a day.      disposable gloves Misc Extra large and large non latex gloves for medication application, use by staff applying eye gtts given 8 times a day and eye ointment BID x 10 days.  #20 gloves daily.  #100 extra-large and #100 large.      erythromycin ophthalmic ointment apply (1CM)  by ophthalmic route 1 time every day ribbon into the lower conjunctival sac(s) in the affected eye(s) 2/3/2016: Received from: [facility]     latanoprost (XALATAN) 0.005 % ophthalmic solution Administer 1 drop to the right eye bedtime.      levothyroxine (SYNTHROID, LEVOTHROID) 75 MCG tablet TAKE 1 TABLET BY MOUTH ONCE DAILY.      saliva stimulant (BIOTENE ORALBALANCE) Gel gel by Mucous Membrane route as needed.      triamcinolone (KENALOG) 0.1 % cream Apply to affected area once daily as needed for dry skin 4/25/2018: prn

## 2021-06-21 NOTE — PROGRESS NOTES
"Assessment / Impression     1. Bilateral impacted cerumen     2. Vitamin B12 deficiency     3. Trisomy 21 (Down Syndrome)     4. Hearing difficulty of both ears         Plan:     I removed the impacted cerumen from his ear canals myself using the lighted spatula. He tolerated the procedure well.  I recommended he follow-up in 1 month to reevaluate this.  He was given his vitamin B12 injection today.  He will be following up with audiology as scheduled.    Subjective:      HPI: Christian Bobby is a 58 y.o. male with a history of Down's syndrome who presents to the clinic for follow-up.  He has a history of cerumen impaction and needs to have the cerumen removed every 1-2 months.  A staff person from his group home is present and reports that the wax buildup has become worse over the past few weeks.  He wears hearing aids in both ears and has regular audiology appointments every 3 months. He also has a history of vitamin B12 deficiency and is due for his next injection.      Review of Systems  All other systems reviewed and are negative.     Social History     Tobacco Use   Smoking Status Never Smoker   Smokeless Tobacco Never Used       No family history on file.    Objective:     /70 (Patient Site: Right Arm, Patient Position: Sitting, Cuff Size: Adult Large)   Pulse 60   Temp 97.6  F (36.4  C) (Oral)   Resp 16   Ht 5' 5\" (1.651 m)   Wt (!) 265 lb (120.2 kg)   BMI 44.10 kg/m    Physical Examination: General appearance - alert, well appearing, and in no distress  Eyes:  extraocular eye movements intact  Ears: Bilateral canals are impacted with cerumen.  Once this was removed the tympanic membranes appeared clear.  Neck: No lymphadenopathy  Heart: Regular rate and rhythm  Lungs: Clear to auscultation bilaterally.      No results found for this or any previous visit (from the past 168 hour(s)).    Current Outpatient Medications   Medication Sig Note     allopurinol (ZYLOPRIM) 100 MG tablet TAKE 1 TABLET BY " MOUTH ONCE DAILY.      artificial tears,hypromellose, (GENTEAL; SYSTANE) 0.3 % Gel Administer 1 drop to both eyes 2 (two) times a day.      disposable gloves Misc Extra large and large non latex gloves for medication application, use by staff applying eye gtts given 8 times a day and eye ointment BID x 10 days.  #20 gloves daily.  #100 extra-large and #100 large.      erythromycin ophthalmic ointment apply (1CM)  by ophthalmic route 1 time every day ribbon into the lower conjunctival sac(s) in the affected eye(s) 2/3/2016: Received from: [facility]     latanoprost (XALATAN) 0.005 % ophthalmic solution Administer 1 drop to the right eye bedtime.      levothyroxine (SYNTHROID, LEVOTHROID) 75 MCG tablet Take 1 tablet (75 mcg total) by mouth daily.      saliva stimulant (BIOTENE ORALBALANCE) Gel gel by Mucous Membrane route as needed.      triamcinolone (KENALOG) 0.1 % cream Apply to affected area once daily as needed for dry skin 4/25/2018: prn     timolol (BETIMOL) 0.5 % ophthalmic solution Administer 1 drop to the right eye daily.

## 2021-06-21 NOTE — NURSING NOTE
The following medication was given:     MEDICATION: Vitamin B12  1000 mcg  ROUTE: IM  SITE: Deltoid - Right  DOSE: 1 mL  LOT #:   :  [employer]  EXPIRATION DATE:  10/31/2022  NDC: 81876-252-09    Clinic Administered Medication Documentation      Injectable Medication Documentation    Patient was given Cyanocobalamin (B-12). Prior to medication administration, verified patients identity using patient s name and date of birth. Please see MAR and medication order for additional information. Patient instructed to remain in clinic for 15 minutes and report any adverse reaction to staff immediately .      Was entire vial of medication used? Yes  Vial/Syringe: Single dose vial  Expiration Date:  10/31/2022  Was this medication supplied by the patient? No    Lorna Garcia MA on 6/21/2021 at 11:21 AM

## 2021-06-22 ENCOUNTER — TELEPHONE (OUTPATIENT)
Dept: FAMILY MEDICINE | Facility: CLINIC | Age: 61
End: 2021-06-22

## 2021-06-22 NOTE — PROGRESS NOTES
Assessment and Plan:       1. Encounter for general adult medical examination with abnormal findings  I encouraged him to eat a healthy diet and get regular physical activity.  We are going to check labs today.    2. Gout  Continue allopurinol 100 mg daily.  - Uric Acid    3. Trisomy 21 (Down Syndrome)  He is currently living in a group home.    4. Hypothyroidism, unspecified type  Continue levothyroxine 75 mcg daily.  We can adjust the dose if needed once the TSH results are back.  - Thyroid Cascade    5. Vitamin B12 deficiency  He was given a vitamin B12 injection today.  - Vitamin B12    6. Bilateral impacted cerumen  Personally removed cerumen from both ear canals using the lighted spatula.  He tolerated this well.  He may continue to come in monthly for this.    7. Hearing difficulty of both ears  He will continue to use hearing aids and follow-up with audiology regularly.  He also comes in to have cerumen removed monthly.    8. Obstructive sleep apnea syndrome  Stable on BiPAP    9.  History of prediabetes  - Glycosylated Hemoglobin A1c    10. Moderate intellectual disabilities    11. Corneal disorder  He will continue the eyedrops and follow-up with ophthalmology as scheduled.    12. Dry skin  They will continue to use moisturizers as needed at the group home.    13. Screening for prostate cancer  - PSA (Prostatic-Specific Antigen), Annual Screen    14. Class 3 severe obesity due to excess calories with body mass index (BMI) of 40.0 to 44.9 in adult, unspecified whether serious comorbidity present (H)  I encouraged him to eat healthy foods and get regular exercise.  We are going to check a CMP and hemogram.  - Comprehensive Metabolic Panel  - HM2(CBC w/o Differential)     The patient's current medical problems were reviewed.    The following high BMI interventions were performed this visit: encouragement to exercise and lifestyle education regarding diet  The following health maintenance schedule was  reviewed with the patient and provided in printed form in the after visit summary:   Health Maintenance   Topic Date Due     FECAL OCCULT BLOOD/FIT ANNUAL COLON CANCER SCREENING  05/04/2019     TDAP ADULT ONE TIME DOSE  08/26/2020     ADVANCE DIRECTIVES DISCUSSED WITH PATIENT  02/03/2021     INFLUENZA VACCINE RULE BASED  Completed     TD 18+ HE  Discontinued        Subjective:   Chief Complaint: Christian Bobby is an 58 y.o. male here for an Annual Wellness visit.   HPI: He has a medical history significant for Down syndrome, hypothyroidism, gout, obstructive sleep apnea on BiPAP, bilateral knee osteoarthritis, corneal disorder, obesity, recurrent bilateral cerumen impaction, vitamin B12 deficiency, hearing loss, dry skin and a history of prediabetes.  He lives in a group home.  One of the staff members is present and helps provide some of the history.  They report that he has been doing well.  On 12/11/17 he had a normal vitamin B12 level, normal TSH, PSA, CMP and cholesterol panel.  He is urinating and having normal bowel movements.  He tolerates BiPAP well.  They report that his ears seem plugged up with cerumen again.  He typically has these cleaned out every month when he comes in for the B12 injections.    Review of Systems:  Please see above.  The rest of the review of systems are negative for all systems.    Patient Care Team:  Clifford Wooten, DO as PCP - General (Family Medicine)     Patient Active Problem List   Diagnosis     Moderate Intellectual Disabilities     Cataract     Osteoarthritis of knees, bilateral     Trisomy 21 (Down Syndrome)     Hypothyroidism     Gout     Obesity     Prediabetes     Thyroglossal Cyst     Dementia     Vitamin B12 deficiency     Narcolepsy     Sleep Apnea     Tinea Pedis     Cerumen Impaction In Both Ears     Dry skin     Corneal disorder     Onychomycosis     Callus of foot     Hearing difficulty of both ears     Seborrheic dermatitis     No past medical  history on file.   Past Surgical History:   Procedure Laterality Date     TX EXCIS THYROID DUCT CYST/SINUS      Description: Thyroglossal Duct Cyst Excision;  Recorded: 05/01/2012;      No family history on file.   Social History     Socioeconomic History     Marital status: Single     Spouse name: Not on file     Number of children: Not on file     Years of education: Not on file     Highest education level: Not on file   Social Needs     Financial resource strain: Not on file     Food insecurity - worry: Not on file     Food insecurity - inability: Not on file     Transportation needs - medical: Not on file     Transportation needs - non-medical: Not on file   Occupational History     Not on file   Tobacco Use     Smoking status: Never Smoker     Smokeless tobacco: Never Used   Substance and Sexual Activity     Alcohol use: No     Drug use: No     Sexual activity: Not on file   Other Topics Concern     Not on file   Social History Narrative     Not on file      Current Outpatient Medications   Medication Sig Dispense Refill     allopurinol (ZYLOPRIM) 100 MG tablet TAKE 1 TABLET BY MOUTH ONCE DAILY. 28 tablet 10     artificial tears,hypromellose, (GENTEAL; SYSTANE) 0.3 % Gel Administer 1 drop to both eyes 2 (two) times a day. 10 g 11     disposable gloves Misc Extra large and large non latex gloves for medication application, use by staff applying eye gtts given 8 times a day and eye ointment BID x 10 days.  #20 gloves daily.  #100 extra-large and #100 large. 400 each 0     erythromycin ophthalmic ointment apply (1CM)  by ophthalmic route 1 time every day ribbon into the lower conjunctival sac(s) in the affected eye(s)       latanoprost (XALATAN) 0.005 % ophthalmic solution Administer 1 drop to the right eye bedtime.       levothyroxine (SYNTHROID, LEVOTHROID) 75 MCG tablet Take 1 tablet (75 mcg total) by mouth daily. 90 tablet 0     saliva stimulant (BIOTENE ORALBALANCE) Gel gel by Mucous Membrane route as needed.  "      timolol (BETIMOL) 0.5 % ophthalmic solution Administer 1 drop to the right eye daily. 5 mL 1     triamcinolone (KENALOG) 0.1 % cream Apply to affected area once daily as needed for dry skin 30 g 3     Current Facility-Administered Medications   Medication Dose Route Frequency Provider Last Rate Last Dose     cyanocobalamin injection 1,000 mcg  1,000 mcg Intramuscular Q30 Days Lexi Thibodeaux MD   1,000 mcg at 05/23/18 1524     cyanocobalamin injection 1,000 mcg  1,000 mcg Intramuscular Q30 Days Lexi Thibodeaux MD   1,000 mcg at 12/19/18 1603      Objective:   Vital Signs:   Visit Vitals  BP 96/64 (Patient Site: Right Arm, Patient Position: Sitting, Cuff Size: Adult Large)   Pulse 60   Temp 98.7  F (37.1  C) (Oral)   Resp 16   Ht 5' 5\" (1.651 m)   Wt (!) 256 lb 8 oz (116.3 kg)   BMI 42.68 kg/m         VisionScreening:  No exam data present     PHYSICAL EXAM  General Appearance: Alert, cooperative, no distress, appears stated age  Head: Normocephalic, without obvious abnormality, atraumatic  Eyes: PERRL, conjunctiva/corneas clear, EOM's intact  Ears: Both canals are impacted with cerumen.  The TM's and external ear canals appeared normal once the cerumen was removed.    Nose: Nares normal, septum midline,mucosa normal, no drainage  Throat: Lips, mucosa, and tongue normal; teeth and gums normal  Neck: Supple, symmetrical, trachea midline, no adenopathy;  thyroid: not enlarged, symmetric, no tenderness/mass/nodules  Back: Symmetric, no curvature, ROM normal, no CVA tenderness  Lungs: Clear to auscultation bilaterally, respirations unlabored  Heart: Regular rate and rhythm, S1 and S2 normal, no murmur, rub, or gallop,  Abdomen: Soft, non-tender, bowel sounds active all four quadrants,  no masses, no organomegaly  Musculoskeletal: Normal range of motion. No joint swelling or deformity.   Extremities: Tender to palpation over both knees, especially with flexion and extension maneuvers.  No obvious " effusions.  Skin: Skin color, texture, turgor normal, no rashes or lesions  Lymph nodes: Cervical, supraclavicular, and axillary nodes normal  Neurologic: He is alert.  No focal deficits.  Normal deep tendon reflexes.   Psychiatric: He has a normal mood and affect.     Assessment Results 12/19/2018   Activities of Daily Living 2-4 - Moderate impairment   Instrumental Activities of Daily Living 5-6 - Severe functional impairment   Get Up and Go Score 12 seconds or more   Mini Cog Total Score 0   Some recent data might be hidden     A Mini-Cog score of 0-2 suggests the possibility of dementia, score of 3-5 suggests no dementia    Identified Health Risks:     The patient was counseled and encouraged to consider modifying their diet and eating habits. He was provided with information on recommended healthy diet options.  The patient reports that he has difficulty with activities of daily living. I have asked that the patient make a follow up appointment in 4 weeks where this issue will be further evaluated and addressed.  The patient reports that he has difficulty with instrumental activities of daily living.  He was provided with a list of local organizations that provide support services and advised to make a follow up appointment in 4 weeks to address this further.   The patient was provided with written information regarding signs of hearing loss.  He is at risk for falling and has been provided with information to reduce the risk of falling at home.  Information regarding advance directives (living [place]), including where he can download the appropriate form, was provided to the patient via the AVS.       The patient was provided with appropriate referrals to address his memory problem.

## 2021-06-22 NOTE — TELEPHONE ENCOUNTER
Reason for Call:  Form, our goal is to have forms completed with 72 hours, however, some forms may require a visit or additional information.    Type of letter, form or note:  Geoff-case management and ICD-10 codes    Who is the form from?: Geoff (if other please explain)    Where did the form come from: form was faxed in    What clinic location was the form placed at?: [facility]    Where the form was placed: Dr Aubree Padilla's Box/Folder    What number is listed as a contact on the form?: [phone] chloe Boudreaux       Additional comments:     Call taken on 6/22/2021 at 2:33 PM by Mita Peguero

## 2021-06-23 NOTE — TELEPHONE ENCOUNTER
Please print his problem list with the ICD 10 codes and fax it back with the paperwork.  Thank you, DE

## 2021-06-23 NOTE — PROGRESS NOTES
"Assessment / Impression     1. Bilateral impacted cerumen     2. Vitamin B12 deficiency     3. Trisomy 21 (Down Syndrome)         Plan:     I removed the impacted cerumen from his ear canals myself using the lighted spatula. He tolerated the procedure well.  I recommended he follow-up in 1 month to reevaluate this.  He was given his vitamin B12 injection today.  He will be following up with audiology as scheduled.    Subjective:      HPI: Christian Bobby is a 58 y.o. male with a history of Down's syndrome who presents to the clinic for follow-up.  He has a history of cerumen impaction and needs to have the cerumen removed every 1-2 months.  A staff person from his group home is present and reports that the wax buildup has become worse over the past few weeks.  He wears hearing aids in both ears and has regular audiology appointments every 3 months.  He was told that there is a lot of wax buildup and his audiology appointment last week.  He is not complaining of pain in his ears.  He also has a history of vitamin B12 deficiency and is due for his next injection.      Review of Systems  All other systems reviewed and are negative.     Social History     Tobacco Use   Smoking Status Never Smoker   Smokeless Tobacco Never Used       No family history on file.    Objective:     /60 (Patient Site: Right Arm, Patient Position: Sitting, Cuff Size: Adult Large)   Pulse 68   Temp 98.7  F (37.1  C) (Oral)   Resp 16   Ht 5' 5.5\" (1.664 m)   Wt (!) 258 lb 2 oz (117.1 kg)   BMI 42.30 kg/m    Physical Examination: General appearance - alert, well appearing, and in no distress  Eyes:  extraocular eye movements intact  Ears: Bilateral canals are impacted with cerumen.  Once this was removed the tympanic membranes appeared clear.  Neck: No lymphadenopathy  Heart: Regular rate and rhythm  Lungs: Clear to auscultation bilaterally.      No results found for this or any previous visit (from the past 168 hour(s)).    Current " Outpatient Medications   Medication Sig Note     allopurinol (ZYLOPRIM) 100 MG tablet TAKE 1 TABLET BY MOUTH ONCE DAILY.      artificial tears,hypromellose, (GENTEAL; SYSTANE) 0.3 % Gel Administer 1 drop to both eyes 2 (two) times a day.      disposable gloves Misc Extra large and large non latex gloves for medication application, use by staff applying eye gtts given 8 times a day and eye ointment BID x 10 days.  #20 gloves daily.  #100 extra-large and #100 large.      erythromycin ophthalmic ointment apply (1CM)  by ophthalmic route 1 time every day ribbon into the lower conjunctival sac(s) in the affected eye(s) 2/3/2016: Received from: [facility]     latanoprost (XALATAN) 0.005 % ophthalmic solution Administer 1 drop to the right eye bedtime.      levothyroxine (SYNTHROID, LEVOTHROID) 75 MCG tablet Take 1 tablet (75 mcg total) by mouth daily.      saliva stimulant (BIOTENE ORALBALANCE) Gel gel by Mucous Membrane route as needed.      timolol (BETIMOL) 0.5 % ophthalmic solution Administer 1 drop to the right eye daily.      triamcinolone (KENALOG) 0.1 % cream Apply to affected area once daily as needed for dry skin 4/25/2018: prn

## 2021-06-24 NOTE — PROGRESS NOTES
"  Assessment/Plan:         1. Bilateral impacted cerumen     2. Vitamin B12 deficiency     3. Class 3 severe obesity due to excess calories with body mass index (BMI) of 40.0 to 44.9 in adult, unspecified whether serious comorbidity present (H)  Ambulatory referral to Nutrition Services     Cerumen was successfully removed.  B12 shot was given today.  Patient tolerated both these procedures.  In addition I provided brief education on normal portion sizes for individuals.  I also placed a referral to nutrition services for further education.  I did recommend working on weight loss as patient is considered morbidly obese.        Subjective:      Christian Bobby is a 58 y.o. male who presents for cerumen impaction clearing and B12 injection.  Patient comes in monthly for ear cleaning and B12 injection as he has ear canals that do not allow his wax to pass naturally.  He has not tolerated previous attempts at softening his earwax through his group home.  Having his ears cleaned out on a monthly basis has significantly reduced the efforts at removing his earwax and he tolerates the lavage well.  He is otherwise doing well and has no other questions or concerns today.  He is here with his  today.  His  is asking for advice on portion sizes for Jhoan and further recommendations to help with weight loss and management.    The following portions of the patient's history were reviewed and updated as appropriate: allergies, current medications and problem list.    Review of Systems   Pertinent items are noted in HPI.      Objective:      /68   Pulse 80   Resp 16   Ht 5' 5.5\" (1.664 m)   Wt (!) 258 lb (117 kg)   BMI 42.28 kg/m      General appearance: alert, appears stated age and cooperative  Head: Normocephalic, without obvious abnormality, atraumatic  Ears: normal TM's and external ear canals both ears after cerumen removal by CMA utilizing water lavage.    "

## 2021-06-24 NOTE — TELEPHONE ENCOUNTER
Refill Approved    Rx renewed per Medication Renewal Policy. Medication was last renewed on 11/22/2018.    Serafin Kirkland, [facility] Connection Triage/Med Refill 2/20/2019     Requested Prescriptions   Pending Prescriptions Disp Refills     levothyroxine (SYNTHROID, LEVOTHROID) 75 MCG tablet [Pharmacy Med Name: LEVOTHYROXINE 75MCG TABS] 28 tablet 10     Sig: TAKE 1 TABLET BY MOUTH ONCE DAILY    Thyroid Hormones Protocol Passed - 2/19/2019 12:19 PM       Passed - Provider visit in past 12 months or next 3 months    Last office visit with prescriber/PCP: 1/22/2019 Clifford Wooten DO OR same dept: 1/22/2019 Clifford Wooten DO OR same specialty: 1/22/2019 Clifford Wooten DO  Last physical: 12/19/2018 Last MTM visit: Visit date not found   Next visit within 3 mo: Visit date not found  Next physical within 3 mo: Visit date not found  Prescriber OR PCP: Clifford Padilla DO  Last diagnosis associated with med order: 1. Hypothyroidism  - levothyroxine (SYNTHROID, LEVOTHROID) 75 MCG tablet [Pharmacy Med Name: LEVOTHYROXINE 75MCG TABS]; TAKE 1 TABLET BY MOUTH ONCE DAILY  Dispense: 28 tablet; Refill: 10    If protocol passes may refill for 12 months if within 3 months of last provider visit (or a total of 15 months).            Passed - TSH on file in past 12 months for patient age 12 & older    TSH   Date Value Ref Range Status   12/19/2018 1.20 0.30 - 5.00 uIU/mL Final

## 2021-07-03 NOTE — ADDENDUM NOTE
Addendum Note by Clifford Mcihaud DO at 7/29/2020  2:16 PM     Author: Clifford Michaud DO Service: -- Author Type: Physician    Filed: 7/29/2020  2:16 PM Encounter Date: 7/24/2020 Status: Signed    : Clifford Michaud DO (Physician)    Addended by: CLIFFORD MICHAUD on: 7/29/2020 02:16 PM        Modules accepted: Orders

## 2021-07-15 ENCOUNTER — TELEPHONE (OUTPATIENT)
Dept: FAMILY MEDICINE | Facility: CLINIC | Age: 61
End: 2021-07-15

## 2021-07-15 NOTE — TELEPHONE ENCOUNTER
Reason for Call:  Form, our goal is to have forms completed with 72 hours, however, some forms may require a visit or additional information.    Type of letter, form or note:  [employer]    Who is the form from?: [employer] (if other please explain)    Where did the form come from: form was faxed in    What clinic location was the form placed at?: [facility]    Where the form was placed: [facility]  Box/Folder    What number is listed as a contact on the form?: fax [phone]       Additional comments:  Please complete and fax    Call taken on 7/15/2021 at 3:15 PM by Robert Israel

## 2021-07-27 ENCOUNTER — TRANSFERRED RECORDS (OUTPATIENT)
Dept: HEALTH INFORMATION MANAGEMENT | Facility: CLINIC | Age: 61
End: 2021-07-27

## 2021-08-03 NOTE — PROGRESS NOTES
"    Assessment & Plan     Bilateral impacted cerumen    Vitamin B12 deficiency (non anemic)    Trisomy 21, Down syndrome    Bilateral hearing loss, unspecified hearing loss type    I personally removed the impacted cerumen from his ear canals myself using the lighted curette.  He tolerated the procedure well.  He may follow-up in 1-2 months for reevaluation.  He was given his vitamin B12 injection today.  He will continue to follow-up with audiology as scheduled               BMI:   Estimated body mass index is 39.22 kg/m  as calculated from the following:    Height as of this encounter: 1.702 m (5' 7\").    Weight as of this encounter: 113.6 kg (250 lb 6.4 oz).           No follow-ups on file.    Clifford Padilla DO  [facility]    Melina Staples is a 60 year old who presents for the following health issues  accompanied by his staff:    HPI     Pt here for bilateral ear check and B-12 injection. Pt also has form for new hearing aids to be filled out.    Christian Bobby is a 60 y.o. male with a history of Down's syndrome who presents to the clinic for follow-up. He has a history of cerumen impaction and needs to have the cerumen removed every 1-2 months. A staff person from his group home is present and reports that the wax buildup seems worse over the past few weeks. He wears hearing aids and has regular audiology appointments every few months. He is not complaining of pain or drainage in his ears. His energy level has been good. He also has a history of vitamin B12 deficiency and is due for his next injection.        Review of Systems   Constitutional, HEENT, cardiovascular, pulmonary, gi and gu systems are negative, except as otherwise noted.      Objective    Resp 22   Ht 1.702 m (5' 7\")   Wt 113.6 kg (250 lb 6.4 oz)   BMI 39.22 kg/m    Body mass index is 39.22 kg/m .  Physical Exam     GENERAL: healthy, alert and no distress  EYES: Eyes grossly normal to inspection, PERRL and " conjunctivae and sclerae normal  HENT: Both canals are impacted with cerumen.  Once this was removed the canals and tympanic membranes appeared clear.  NECK: no adenopathy, no asymmetry, masses, or scars and thyroid normal to palpation  RESP: lungs clear to auscultation - no rales, rhonchi or wheezes  CV: regular rate and rhythm, normal S1 S2, no S3 or S4, no murmur, click or rub, no peripheral edema and peripheral pulses strong  PSYCH: mentation appears normal, affect normal/bright

## 2021-08-04 ENCOUNTER — OFFICE VISIT (OUTPATIENT)
Dept: FAMILY MEDICINE | Facility: CLINIC | Age: 61
End: 2021-08-04
Payer: MEDICARE

## 2021-08-04 VITALS
BODY MASS INDEX: 39.3 KG/M2 | HEIGHT: 67 IN | SYSTOLIC BLOOD PRESSURE: 104 MMHG | OXYGEN SATURATION: 95 % | DIASTOLIC BLOOD PRESSURE: 67 MMHG | HEART RATE: 64 BPM | RESPIRATION RATE: 22 BRPM | WEIGHT: 250.4 LBS | TEMPERATURE: 98.4 F

## 2021-08-04 DIAGNOSIS — H61.23 BILATERAL IMPACTED CERUMEN: Primary | ICD-10-CM

## 2021-08-04 DIAGNOSIS — E53.8 VITAMIN B12 DEFICIENCY (NON ANEMIC): ICD-10-CM

## 2021-08-04 DIAGNOSIS — Q90.9 TRISOMY 21, DOWN SYNDROME: ICD-10-CM

## 2021-08-04 DIAGNOSIS — H91.93 BILATERAL HEARING LOSS, UNSPECIFIED HEARING LOSS TYPE: ICD-10-CM

## 2021-08-04 PROCEDURE — 69209 REMOVE IMPACTED EAR WAX UNI: CPT | Mod: RT | Performed by: FAMILY MEDICINE

## 2021-08-04 PROCEDURE — 96372 THER/PROPH/DIAG INJ SC/IM: CPT | Mod: 59 | Performed by: FAMILY MEDICINE

## 2021-08-04 PROCEDURE — 99213 OFFICE O/P EST LOW 20 MIN: CPT | Mod: 25 | Performed by: FAMILY MEDICINE

## 2021-08-04 RX ADMIN — CYANOCOBALAMIN 1000 MCG: 1000 INJECTION, SOLUTION INTRAMUSCULAR; SUBCUTANEOUS at 16:07

## 2021-08-04 ASSESSMENT — MIFFLIN-ST. JEOR: SCORE: 1914.44

## 2021-08-04 NOTE — NURSING NOTE
Clinic Administered Medication Documentation    Administrations This Visit     cyanocobalamin injection 1,000 mcg     Admin Date  08/04/2021 Action  Given Dose  1,000 mcg Route  Intramuscular Site  Left Deltoid Administered By  Lorna Garcia MA    Ordering Provider: Clifford Wooten, DO    Patient Supplied?: No                  Injectable Medication Documentation    Patient was given Cyanocobalamin (B-12). Prior to medication administration, verified patients identity using patient s name and date of birth. Please see MAR and medication order for additional information. Patient instructed to remain in clinic for 15 minutes, report any adverse reaction to staff immediately  and stay in clinic after the injection but patient declined.      Was entire vial of medication used? Yes  Vial/Syringe: Single dose vial  Expiration Date:  1/31/2023  Was this medication supplied by the patient? No    Lorna Garcia MA on 8/4/2021 at 4:08 PM

## 2021-08-20 ENCOUNTER — TELEPHONE (OUTPATIENT)
Dept: FAMILY MEDICINE | Facility: CLINIC | Age: 61
End: 2021-08-20

## 2021-08-20 NOTE — TELEPHONE ENCOUNTER
Reason for Call:  Other call back and Has a Suggestion    Detailed comments: Long from [facility] beds at the [facility] Has a Idea to Run by Dr Mak- patient gets a B-12 shot every month he has Missed it Many times, is wondering maybe change it to a B-12 pill?     Phone Number Long can be reached at: Other phone number:  [phone]    Best Time: Today    Can we leave a detailed message on this number? YES    Call taken on 8/20/2021 at 9:25 AM by Linda Foote

## 2021-08-23 NOTE — TELEPHONE ENCOUNTER
He should be getting his B12 injection when he comes in for his monthly appointments to have cerumen removed.  I will look into why some of these were missed.  Please let them know that I think it would be best to continue having him get the injections in the office since the medication is better absorbed that way compared to taking it orally.  Furthermore, he is already coming in every month for office visits.  Thank you, DE

## 2021-09-07 ENCOUNTER — ALLIED HEALTH/NURSE VISIT (OUTPATIENT)
Dept: FAMILY MEDICINE | Facility: CLINIC | Age: 61
End: 2021-09-07
Payer: MEDICARE

## 2021-09-07 DIAGNOSIS — E53.8 VITAMIN B12 DEFICIENCY (NON ANEMIC): Primary | ICD-10-CM

## 2021-09-07 PROCEDURE — 99207 PR NO CHARGE NURSE ONLY: CPT

## 2021-09-07 PROCEDURE — 96372 THER/PROPH/DIAG INJ SC/IM: CPT | Performed by: FAMILY MEDICINE

## 2021-09-07 RX ADMIN — CYANOCOBALAMIN 1000 MCG: 1000 INJECTION, SOLUTION INTRAMUSCULAR; SUBCUTANEOUS at 14:52

## 2021-10-07 ENCOUNTER — ALLIED HEALTH/NURSE VISIT (OUTPATIENT)
Dept: FAMILY MEDICINE | Facility: CLINIC | Age: 61
End: 2021-10-07
Payer: MEDICARE

## 2021-10-07 DIAGNOSIS — E53.8 VITAMIN B12 DEFICIENCY (NON ANEMIC): Primary | ICD-10-CM

## 2021-10-07 PROCEDURE — 96372 THER/PROPH/DIAG INJ SC/IM: CPT | Performed by: FAMILY MEDICINE

## 2021-10-07 PROCEDURE — 99207 PR NO CHARGE NURSE ONLY: CPT

## 2021-10-07 RX ADMIN — CYANOCOBALAMIN 1000 MCG: 1000 INJECTION, SOLUTION INTRAMUSCULAR; SUBCUTANEOUS at 10:04

## 2021-11-10 ENCOUNTER — ALLIED HEALTH/NURSE VISIT (OUTPATIENT)
Dept: FAMILY MEDICINE | Facility: CLINIC | Age: 61
End: 2021-11-10
Payer: MEDICARE

## 2021-11-10 DIAGNOSIS — E53.8 VITAMIN B12 DEFICIENCY (NON ANEMIC): Primary | ICD-10-CM

## 2021-11-10 PROCEDURE — 96372 THER/PROPH/DIAG INJ SC/IM: CPT | Performed by: FAMILY MEDICINE

## 2021-11-10 PROCEDURE — 99207 PR NO CHARGE NURSE ONLY: CPT

## 2021-11-10 RX ADMIN — CYANOCOBALAMIN 1000 MCG: 1000 INJECTION, SOLUTION INTRAMUSCULAR; SUBCUTANEOUS at 09:44

## 2021-11-12 ENCOUNTER — TRANSFERRED RECORDS (OUTPATIENT)
Dept: HEALTH INFORMATION MANAGEMENT | Facility: CLINIC | Age: 61
End: 2021-11-12
Payer: MEDICARE

## 2021-11-18 ENCOUNTER — TELEPHONE (OUTPATIENT)
Dept: FAMILY MEDICINE | Facility: CLINIC | Age: 61
End: 2021-11-18
Payer: MEDICARE

## 2021-11-18 NOTE — TELEPHONE ENCOUNTER
Reason for Call:  Form, our goal is to have forms completed with 72 hours, however, some forms may require a visit or additional information.    Type of letter, form or note:    Healing aid form    Who is the form from?:   [place] [facility]    Where did the form come from: form was faxed in    What clinic location was the form placed at?:   [facility]    Where the form was placed:   Dr. Aubree Padilla's desk    What number is listed as a contact on the form?:   Fax: [phone]       Additional comments:   none    Call taken on 11/18/2021 at 3:28 PM by Regina Campoverde

## 2021-11-22 PROBLEM — H90.6 MIXED CONDUCTIVE AND SENSORINEURAL HEARING LOSS, BILATERAL: Status: ACTIVE | Noted: 2021-11-22

## 2021-12-10 ENCOUNTER — TRANSFERRED RECORDS (OUTPATIENT)
Dept: HEALTH INFORMATION MANAGEMENT | Facility: CLINIC | Age: 61
End: 2021-12-10
Payer: MEDICARE

## 2021-12-10 NOTE — PROGRESS NOTES
Assessment & Plan     Bilateral impacted cerumen  I personally removed a large amount of cerumen from both canals using the lighted curette.  He tolerated this procedure well.  He will continue to schedule follow-up visits as needed for this every 1-3 months.    Hypotension, unspecified hypotension type  His blood pressure is stable in the clinic today.  I recommended he continue to stay well-hydrated.  We will try and get the results from the emergency department where he was recently seen for this issue.  - Basic metabolic panel  (Ca, Cl, CO2, Creat, Gluc, K, Na, BUN); Future  - Basic metabolic panel  (Ca, Cl, CO2, Creat, Gluc, K, Na, BUN)    MADELAINE (obstructive sleep apnea)  Currently on BiPAP.  They are working with the sleep clinic about doing another sleep study    Acquired hypothyroidism  He is due for thyroid lab testing.  We can make dosage adjustments to the levothyroxine based on the results.  - TSH with free T4 reflex; Future  - TSH with free T4 reflex    Chronic gout without tophus, unspecified cause, unspecified site  This issue has been stable on allopurinol.  He is due for a uric acid level check.  - Uric acid; Future  - Uric acid    Vitamin B12 deficiency (non anemic)  Stable with the B12 injections.  We are going to check a level today with the blood work.  - Vitamin B12; Future  - Vitamin B12    Unfortunately, we did not have an RN available to administer the Covid vaccine.                   Return in about 4 weeks (around 1/10/2022) for Follow up cerumen impaction.    Clifford Padilla DO  [facility]   Jhoan is a 61 year old who presents for the following health issues     History of Present Illness       He eats 0-1 servings of fruits and vegetables daily.He consumes 3 sweetened beverage(s) daily.He exercises with enough effort to increase his heart rate 9 or less minutes per day.  He exercises with enough effort to increase his heart rate 3 or less days per  "week.   He is taking medications regularly.       Pt here for BP check - would also like moderna COVID booster, shingrix, and b12 injection and ear cleaning.  His medical history significant for trisomy 21, recurrent cerumen impaction, vitamin B12 deficiency, gout, obesity, osteoarthritis in the knees and hypothyroidism.  A group home staff member is present and provides some of the information.  He reports that Jhoan was seen in the emergency department recently after having low blood pressure readings at the sleep clinic.  He reports that IV fluids were given and his blood pressure improved.  They do not think that the labs were checked.  His blood pressure has been stable since being discharged, but they want to make sure that it looks good here in clinic.  They report that he is due to have the cerumen removed from his ears and his next vitamin B12 injection.  He is going to be due for repeat labs by the end of this month.          Review of Systems         Objective    /69   Pulse 56   Temp 97.6  F (36.4  C) (Tympanic)   Ht 5' 7\" (1.702 m)   Wt 254 lb 6.4 oz (115.4 kg)   SpO2 99%   BMI 39.84 kg/m    Body mass index is 39.84 kg/m .  Physical Exam   GENERAL: healthy, alert and no distress  EYES: Eyes grossly normal to inspection, PERRL and conjunctivae and sclerae normal  HENT: Both canals are impacted with cerumen.  Once this was removed the canals and tympanic membranes appeared clear.  Nose and mouth without ulcers or lesions  NECK: no adenopathy, no asymmetry, masses, or scars and thyroid normal to palpation  RESP: lungs clear to auscultation - no rales, rhonchi or wheezes  CV: regular rate and rhythm, normal S1 S2, no S3 or S4, no murmur, click or rub, no peripheral edema and peripheral pulses strong  ABDOMEN: soft, nontender, no hepatosplenomegaly, no masses and bowel sounds normal  SKIN: no suspicious lesions or rashes  NEURO: Normal strength and tone, mentation intact and speech normal  PSYCH: " mentation appears normal, affect normal/bright

## 2021-12-13 ENCOUNTER — OFFICE VISIT (OUTPATIENT)
Dept: FAMILY MEDICINE | Facility: CLINIC | Age: 61
End: 2021-12-13
Payer: MEDICARE

## 2021-12-13 VITALS
WEIGHT: 254.4 LBS | TEMPERATURE: 97.6 F | BODY MASS INDEX: 39.93 KG/M2 | HEART RATE: 56 BPM | OXYGEN SATURATION: 99 % | DIASTOLIC BLOOD PRESSURE: 69 MMHG | HEIGHT: 67 IN | SYSTOLIC BLOOD PRESSURE: 110 MMHG

## 2021-12-13 DIAGNOSIS — M1A.9XX0 CHRONIC GOUT WITHOUT TOPHUS, UNSPECIFIED CAUSE, UNSPECIFIED SITE: ICD-10-CM

## 2021-12-13 DIAGNOSIS — H61.23 BILATERAL IMPACTED CERUMEN: Primary | ICD-10-CM

## 2021-12-13 DIAGNOSIS — E03.9 ACQUIRED HYPOTHYROIDISM: ICD-10-CM

## 2021-12-13 DIAGNOSIS — I95.9 HYPOTENSION, UNSPECIFIED HYPOTENSION TYPE: ICD-10-CM

## 2021-12-13 DIAGNOSIS — G47.33 OSA (OBSTRUCTIVE SLEEP APNEA): ICD-10-CM

## 2021-12-13 DIAGNOSIS — E53.8 VITAMIN B12 DEFICIENCY (NON ANEMIC): ICD-10-CM

## 2021-12-13 PROCEDURE — 36415 COLL VENOUS BLD VENIPUNCTURE: CPT | Performed by: FAMILY MEDICINE

## 2021-12-13 PROCEDURE — 90471 IMMUNIZATION ADMIN: CPT | Performed by: FAMILY MEDICINE

## 2021-12-13 PROCEDURE — 84443 ASSAY THYROID STIM HORMONE: CPT | Performed by: FAMILY MEDICINE

## 2021-12-13 PROCEDURE — 80048 BASIC METABOLIC PNL TOTAL CA: CPT | Performed by: FAMILY MEDICINE

## 2021-12-13 PROCEDURE — 96372 THER/PROPH/DIAG INJ SC/IM: CPT | Performed by: FAMILY MEDICINE

## 2021-12-13 PROCEDURE — 84550 ASSAY OF BLOOD/URIC ACID: CPT | Performed by: FAMILY MEDICINE

## 2021-12-13 PROCEDURE — 90750 HZV VACC RECOMBINANT IM: CPT | Performed by: FAMILY MEDICINE

## 2021-12-13 PROCEDURE — 99214 OFFICE O/P EST MOD 30 MIN: CPT | Mod: 25 | Performed by: FAMILY MEDICINE

## 2021-12-13 PROCEDURE — 82607 VITAMIN B-12: CPT | Performed by: FAMILY MEDICINE

## 2021-12-13 PROCEDURE — 69210 REMOVE IMPACTED EAR WAX UNI: CPT | Mod: 59 | Performed by: FAMILY MEDICINE

## 2021-12-13 RX ADMIN — CYANOCOBALAMIN 1000 MCG: 1000 INJECTION, SOLUTION INTRAMUSCULAR; SUBCUTANEOUS at 17:35

## 2021-12-13 ASSESSMENT — MIFFLIN-ST. JEOR: SCORE: 1927.58

## 2021-12-13 NOTE — NURSING NOTE
Prior to immunization administration, verified patients identity using patient s name and date of birth. Please see Immunization Activity for additional information.     Screening Questionnaire for Adult Immunization    Are you sick today?   No   Do you have allergies to medications, food, a vaccine component or latex?   No   Have you ever had a serious reaction after receiving a vaccination?   No   Do you have a long-term health problem with heart, lung, kidney, or metabolic disease (e.g., diabetes), asthma, a blood disorder, no spleen, complement component deficiency, a cochlear implant, or a spinal fluid leak?  Are you on long-term aspirin therapy?   No   Do you have cancer, leukemia, HIV/AIDS, or any other immune system problem?   No   Do you have a parent, brother, or sister with an immune system problem?   No   In the past 3 months, have you taken medications that affect  your immune system, such as prednisone, other steroids, or anticancer drugs; drugs for the treatment of rheumatoid arthritis, Crohn s disease, or psoriasis; or have you had radiation treatments?   No   Have you had a seizure, or a brain or other nervous system problem?   No   During the past year, have you received a transfusion of blood or blood    products, or been given immune (gamma) globulin or antiviral drug?   No   For women: Are you pregnant or is there a chance you could become       pregnant during the next month?   No   Have you received any vaccinations in the past 4 weeks?   No     Immunization questionnaire answers were all negative.        Per orders of Dr. Aubree Padilla, injection of Shingrix given by Lorna Garcia MA. Patient instructed to remain in clinic for 15 minutes afterwards, and to report any adverse reaction to me immediately.       Screening performed by Lorna Garcia MA on 12/13/2021 at 5:47 PM.  Lorna Garcia MA on 12/13/2021 at 5:47 PM      Clinic Administered Medication Documentation          Injectable  Medication Documentation    Patient was given Cyanocobalamin (B-12). Prior to medication administration, verified patients identity using patient s name and date of birth. Please see MAR and medication order for additional information. Patient instructed to remain in clinic for 15 minutes, report any adverse reaction to staff immediately  and stay in clinic after the injection but patient declined.      Was entire vial of medication used? Yes  Vial/Syringe: Single dose vial  Expiration Date:  3/30/23  Was this medication supplied by the patient? No     Lorna Garcia MA on 12/13/2021 at 5:48 PM

## 2021-12-13 NOTE — LETTER
December 15, 2021      Christian Bobby  [address] [facility] [place] [address]        Dear ,    We are writing to inform you of your test results.    Your lab results show that the vitamin B12 level is high.  We can back off on how frequently you are getting the vitamin B12 injections and consider doing this every 2-3 months instead of every month.  The rest of your lab results are normal.    Resulted Orders   TSH with free T4 reflex   Result Value Ref Range    TSH 0.94 0.40 - 4.00 mU/L   Basic metabolic panel  (Ca, Cl, CO2, Creat, Gluc, K, Na, BUN)   Result Value Ref Range    Sodium 138 133 - 144 mmol/L    Potassium 4.4 3.4 - 5.3 mmol/L    Chloride 106 94 - 109 mmol/L    Carbon Dioxide (CO2) 29 20 - 32 mmol/L    Anion Gap 3 3 - 14 mmol/L    Urea Nitrogen 19 7 - 30 mg/dL    Creatinine 0.89 0.66 - 1.25 mg/dL    Calcium 8.6 8.5 - 10.1 mg/dL    Glucose 91 70 - 99 mg/dL    GFR Estimate >90 >60 mL/min/1.73m2      Comment:      As of July 11, 2021, eGFR is calculated by the CKD-EPI creatinine equation, without race adjustment. eGFR can be influenced by muscle mass, exercise, and diet. The reported eGFR is an estimation only and is only applicable if the renal function is stable.   Uric acid   Result Value Ref Range    Uric Acid 6.6 3.5 - 7.2 mg/dL   Vitamin B12   Result Value Ref Range    Vitamin B12 >6,000 (H) 193 - 986 pg/mL       If you have any questions or concerns, please call the clinic at the number listed above.       Sincerely,      Clifford Padilla, DO

## 2021-12-14 LAB — VIT B12 SERPL-MCNC: >6000 PG/ML (ref 193–986)

## 2021-12-15 LAB
ANION GAP SERPL CALCULATED.3IONS-SCNC: 3 MMOL/L (ref 3–14)
BUN SERPL-MCNC: 19 MG/DL (ref 7–30)
CALCIUM SERPL-MCNC: 8.6 MG/DL (ref 8.5–10.1)
CHLORIDE BLD-SCNC: 106 MMOL/L (ref 94–109)
CO2 SERPL-SCNC: 29 MMOL/L (ref 20–32)
CREAT SERPL-MCNC: 0.89 MG/DL (ref 0.66–1.25)
GFR SERPL CREATININE-BSD FRML MDRD: >90 ML/MIN/1.73M2
GLUCOSE BLD-MCNC: 91 MG/DL (ref 70–99)
POTASSIUM BLD-SCNC: 4.4 MMOL/L (ref 3.4–5.3)
SODIUM SERPL-SCNC: 138 MMOL/L (ref 133–144)
TSH SERPL DL<=0.005 MIU/L-ACNC: 0.94 MU/L (ref 0.4–4)
URATE SERPL-MCNC: 6.6 MG/DL (ref 3.5–7.2)

## 2021-12-21 DIAGNOSIS — H18.9 CORNEAL DISORDER: Primary | ICD-10-CM

## 2021-12-23 RX ORDER — ERYTHROMYCIN 5 MG/G
OINTMENT OPHTHALMIC
Qty: 3.5 G | Refills: 11 | Status: SHIPPED | OUTPATIENT
Start: 2021-12-23

## 2022-01-11 ENCOUNTER — OFFICE VISIT (OUTPATIENT)
Dept: FAMILY MEDICINE | Facility: CLINIC | Age: 62
End: 2022-01-11
Payer: MEDICARE

## 2022-01-11 VITALS
HEART RATE: 60 BPM | OXYGEN SATURATION: 97 % | WEIGHT: 259 LBS | RESPIRATION RATE: 16 BRPM | TEMPERATURE: 98 F | DIASTOLIC BLOOD PRESSURE: 72 MMHG | SYSTOLIC BLOOD PRESSURE: 116 MMHG | BODY MASS INDEX: 40.57 KG/M2

## 2022-01-11 DIAGNOSIS — Z12.5 SCREENING FOR PROSTATE CANCER: ICD-10-CM

## 2022-01-11 DIAGNOSIS — Z00.00 ROUTINE GENERAL MEDICAL EXAMINATION AT HEALTH CARE FACILITY: Primary | ICD-10-CM

## 2022-01-11 DIAGNOSIS — Z00.00 ENCOUNTER FOR MEDICARE ANNUAL WELLNESS EXAM: ICD-10-CM

## 2022-01-11 DIAGNOSIS — E53.8 VITAMIN B12 DEFICIENCY (NON ANEMIC): ICD-10-CM

## 2022-01-11 DIAGNOSIS — Z11.1 SCREENING FOR TUBERCULOSIS: ICD-10-CM

## 2022-01-11 DIAGNOSIS — Z12.11 SCREENING FOR COLON CANCER: ICD-10-CM

## 2022-01-11 LAB — PSA SERPL-MCNC: 0.22 UG/L (ref 0–3.5)

## 2022-01-11 PROCEDURE — 99207 PR ANNUAL WELLNESS VISIT, PPS, SUBSEQUENT STAT: CPT | Performed by: FAMILY MEDICINE

## 2022-01-11 PROCEDURE — 86481 TB AG RESPONSE T-CELL SUSP: CPT | Performed by: FAMILY MEDICINE

## 2022-01-11 PROCEDURE — 0064A COVID-19,PF,MODERNA (18+ YRS BOOSTER .25ML): CPT | Performed by: FAMILY MEDICINE

## 2022-01-11 PROCEDURE — G0103 PSA SCREENING: HCPCS | Performed by: FAMILY MEDICINE

## 2022-01-11 PROCEDURE — 36415 COLL VENOUS BLD VENIPUNCTURE: CPT | Performed by: FAMILY MEDICINE

## 2022-01-11 PROCEDURE — 96372 THER/PROPH/DIAG INJ SC/IM: CPT | Performed by: FAMILY MEDICINE

## 2022-01-11 PROCEDURE — 91306 COVID-19,PF,MODERNA (18+ YRS BOOSTER .25ML): CPT | Performed by: FAMILY MEDICINE

## 2022-01-11 RX ORDER — CYANOCOBALAMIN 1000 UG/ML
1000 INJECTION, SOLUTION INTRAMUSCULAR; SUBCUTANEOUS
Status: ACTIVE | OUTPATIENT
Start: 2022-01-11

## 2022-01-11 RX ADMIN — CYANOCOBALAMIN 1000 MCG: 1000 INJECTION, SOLUTION INTRAMUSCULAR; SUBCUTANEOUS at 10:44

## 2022-01-11 ASSESSMENT — ACTIVITIES OF DAILY LIVING (ADL)
CURRENT_FUNCTION: LAUNDRY REQUIRES ASSISTANCE
CURRENT_FUNCTION: TELEPHONE REQUIRES ASSISTANCE
CURRENT_FUNCTION: MEDICATION ADMINISTRATION REQUIRES ASSISTANCE
CURRENT_FUNCTION: HOUSEWORK REQUIRES ASSISTANCE
CURRENT_FUNCTION: PREPARING MEALS REQUIRES ASSISTANCE
CURRENT_FUNCTION: TRANSPORTATION REQUIRES ASSISTANCE
CURRENT_FUNCTION: MONEY MANAGEMENT REQUIRES ASSISTANCE

## 2022-01-11 NOTE — LETTER
January 13, 2022      Christian Bobby  [address] [facility] [place] [address]        Dear  and caregivers,    We are writing to inform you of your test results.  Blood testing is normal including negative tuberculosis screening.      Resulted Orders   PSA, screen   Result Value Ref Range    Prostate Specific Antigen Screen 0.22 0.00 - 3.50 ug/L    Narrative    Assay Method is Abbott Prostate-Specific Antigen (PSA)  Standard-WHO 1st International (90:10)   Quantiferon TB Gold Plus Grey Tube   Result Value Ref Range    Quantiferon Nil Tube 0.00 IU/mL   Quantiferon TB Gold Plus Green Tube   Result Value Ref Range    Quantiferon TB1 Tube 0.00 IU/mL   Quantiferon TB Gold Plus Yellow Tube   Result Value Ref Range    Quantiferon TB2 Tube 0.01    Quantiferon TB Gold Plus Purple Tube   Result Value Ref Range    Quantiferon Mitogen 3.79 IU/mL   Quantiferon TB Gold Plus   Result Value Ref Range    Quantiferon-TB Gold Plus Negative Negative      Comment:      No interferon gamma response to M.tuberculosis antigens was detected. Infection with M.tuberculosis is unlikely, however a single negative result does not exclude infection. In patients at high risk for infection, a second test should be considered in accordance with the 2017 ATS/IDSA/CDC Clinical Pract  ice Guidelines for Diagnosis of Tuberculosis in Adults and Children     TB1 Ag minus Nil Value 0.00 IU/mL    TB2 Ag minus Nil Value 0.01 IU/mL    Mitogen minus Nil Result 3.79 IU/mL    Nil Result 0.00 IU/mL       If you have any questions or concerns, please call the clinic at the number listed above.       Sincerely,      Christian Albarran MD

## 2022-01-11 NOTE — PATIENT INSTRUCTIONS
Patient Education   Personalized Prevention Plan  You are due for the preventive services outlined below.  Your care team is available to assist you in scheduling these services.  If you have already completed any of these items, please share that information with your care team to update in your medical record.  Health Maintenance Due   Topic Date Due     ANNUAL REVIEW OF HM ORDERS  Never done     HIV Screening  Never done     Colorectal Cancer Screening  05/24/2020

## 2022-01-11 NOTE — PROGRESS NOTES
SUBJECTIVE:   Christian Bobby is a 61 year old male who presents for Preventive Visit.      Patient has been advised of split billing requirements and indicates understanding: Yes   Are you in the first 12 months of your Medicare coverage?  No    HPI  Do you feel safe in your environment? Yes    Have you ever done Advance Care Planning? (For example, a Health Directive, POLST, or a discussion with a medical provider or your loved ones about your wishes): No, advance care planning information given to patient to review.  Patient plans to discuss their wishes with loved ones or provider.         Fall risk       Cognitive Screening Not appropriate due to mental handicap        Reviewed and updated as needed this visit by clinical staff  Tobacco  Allergies  Meds             Reviewed and updated as needed this visit by Provider               Social History     Tobacco Use     Smoking status: Never Smoker     Smokeless tobacco: Never Used   Substance Use Topics     Alcohol use: Never         Alcohol Use 1/11/2022   Prescreen: >3 drinks/day or >7 drinks/week? No   Prescreen: >3 drinks/day or >7 drinks/week? -               Current providers sharing in care for this patient include:   Patient Care Team:  Clifford Wooten DO as PCP - General (Family Medicine)  Clifford Wooten DO as Assigned PCP    The following health maintenance items are reviewed in Epic and correct as of today:  Health Maintenance Due   Topic Date Due     ANNUAL REVIEW OF HM ORDERS  Never done     HIV SCREENING  Never done     COLORECTAL CANCER SCREENING  05/24/2020     COVID-19 Vaccine (3 - Booster for Moderna series) 09/09/2021     MEDICARE ANNUAL WELLNESS VISIT  12/31/2021     Lab work is in process          Review of Systems  Review of systems difficult to complete because of the patient's cognitive disability.  However, between the patient's answers and his caregivers answers it does not look like he has been having any recent  "complaints of chest pain or shortness of breath.  No blood in the urine or stool.  No skin lesions that have been changing.    OBJECTIVE:   /72   Pulse 60   Temp 98  F (36.7  C) (Oral)   Resp 16   Wt 117.5 kg (259 lb)   SpO2 97%   BMI 40.57 kg/m   Estimated body mass index is 40.57 kg/m  as calculated from the following:    Height as of 12/13/21: 1.702 m (5' 7\").    Weight as of this encounter: 117.5 kg (259 lb).  Physical Exam  Gen - alert, orientated, NAD  Eyes - some opacification of the right cornea  Neck - supple, no palpable mass or lymphadenopathy  CV - RRR with distant heart tones  Resp - lungs CTA  Ab - soft, nontender, no palpable mass or organomegaly  Skin - chronic appearing rash with redness and hyperpigmentation of the feet bilaterally.  No ulcers or vesicles.  There is some significant dryness and cracking of the skin on both feet.      ASSESSMENT / PLAN:   Christian was seen today for awv.    Diagnoses and all orders for this visit:    Routine general medical examination at health care facility - Encounter for Medicare annual wellness exam  -     COVID-19,PF,MODERNA (18+ YRS BOOSTER .25ML)  Order forms completed for the patient's group home.  The only new orders are for daily foot cares with daily application of a Aquaphor moisturizer along with bacitracin ointment applied to any cracked or open areas on the skin every morning.    Screening for colon cancer  -     COLOGUARD(EXACT SCIENCES)    Screening for prostate cancer  -     PSA, screen; Future  -     PSA, screen    Screening for tuberculosis  -     Quantiferon TB Gold Plus; Future  -     Quantiferon TB Gold Plus    Vitamin B12 deficiency (non anemic)  -     cyanocobalamin injection 1,000 mcg            Patient has been advised of split billing requirements and indicates understanding: No  COUNSELING:  Reviewed preventive health counseling, as reflected in patient instructions    Estimated body mass index is 40.57 kg/m  as calculated from " "the following:    Height as of 12/13/21: 1.702 m (5' 7\").    Weight as of this encounter: 117.5 kg (259 lb).    Weight management plan: Discussed healthy diet and exercise guidelines    He reports that he has never smoked. He has never used smokeless tobacco.      Appropriate preventive services were discussed with this patient, including applicable screening as appropriate for cardiovascular disease, diabetes, osteopenia/osteoporosis, and glaucoma.  As appropriate for age/gender, discussed screening for colorectal cancer, prostate cancer, breast cancer, and cervical cancer. Checklist reviewing preventive services available has been given to the patient.    Reviewed patients plan of care and provided an AVS. The Basic Care Plan (routine screening as documented in Health Maintenance) for Christian meets the Care Plan requirement. This Care Plan has been established and reviewed with the Patient and caregiver.    Christian Albarran MD  [facility]  "

## 2022-01-13 LAB
GAMMA INTERFERON BACKGROUND BLD IA-ACNC: 0 IU/ML
M TB IFN-G BLD-IMP: NEGATIVE
M TB IFN-G CD4+ BCKGRND COR BLD-ACNC: 3.79 IU/ML
MITOGEN IGNF BCKGRD COR BLD-ACNC: 0 IU/ML
MITOGEN IGNF BCKGRD COR BLD-ACNC: 0.01 IU/ML
QUANTIFERON MITOGEN: 3.79 IU/ML
QUANTIFERON NIL TUBE: 0 IU/ML
QUANTIFERON TB1 TUBE: 0 IU/ML
QUANTIFERON TB2 TUBE: 0.01

## 2022-01-18 DIAGNOSIS — L30.9 DERMATITIS: Primary | ICD-10-CM

## 2022-01-18 NOTE — TELEPHONE ENCOUNTER
Routing refill request to provider for review/approval because:  Medication is reported/historical  Nu BUSH RN

## 2022-01-19 RX ORDER — TRIAMCINOLONE ACETONIDE 1 MG/G
CREAM TOPICAL
Qty: 45 G | Refills: 3 | Status: SHIPPED | OUTPATIENT
Start: 2022-01-19 | End: 2024-05-29

## 2022-02-02 ENCOUNTER — TELEPHONE (OUTPATIENT)
Dept: FAMILY MEDICINE | Facility: CLINIC | Age: 62
End: 2022-02-02
Payer: MEDICARE

## 2022-02-02 NOTE — TELEPHONE ENCOUNTER
Family calling for eye drop Rx  From outside provider - they will try to figure out who they need to get request to - eye doctor vs new PCP Dr Deion BUSH, RN

## 2022-02-03 ENCOUNTER — TELEPHONE (OUTPATIENT)
Dept: FAMILY MEDICINE | Facility: CLINIC | Age: 62
End: 2022-02-03
Payer: MEDICARE

## 2022-02-17 ENCOUNTER — TELEPHONE (OUTPATIENT)
Dept: FAMILY MEDICINE | Facility: CLINIC | Age: 62
End: 2022-02-17
Payer: MEDICARE

## 2022-02-17 NOTE — TELEPHONE ENCOUNTER
JF,  Please advise in DE's absence.    Kimberly from group home calling says they [facility] and one to her for the vit B 12 inj q 2-3 months per last lab results.    Pended pharm.    Please advise.  Thanks,  Briana Carnes RN

## 2022-02-21 NOTE — TELEPHONE ENCOUNTER
Called group home   No order needed - Kimberly who called is new   Pt will continue to come to office  Nu BUSH RN

## 2022-02-21 NOTE — TELEPHONE ENCOUNTER
Please contact the group home to clarify what they need for this. This patient typically comes in for appointments to have his ears cleaned every 1-2 months and we give him the B12 injection here in clinic. Are they requesting this prescription now be sent to his pharmacy? Thank you, DE

## 2022-02-24 ENCOUNTER — TELEPHONE (OUTPATIENT)
Dept: FAMILY MEDICINE | Facility: CLINIC | Age: 62
End: 2022-02-24
Payer: MEDICARE

## 2022-02-24 NOTE — TELEPHONE ENCOUNTER
RN notified per MD note below. RN wants a written order to change B-12 to every other month, please fax when done.     CMA prepped and forwarded to MD to sign.

## 2022-02-24 NOTE — TELEPHONE ENCOUNTER
OK to hold b12 injections until next clinic visit.  Compression stockings order sent previously.  OK for below orders.

## 2022-02-24 NOTE — TELEPHONE ENCOUNTER
ABDON called RN and relayed message below. RN has new orders to request see below.     Reason for Call:  Home Health Care    YAEL FERGUSON with [facility] called regarding (reason for call): skilled nursing    Orders are needed for this patient. Skilled nursing    PT: N/A    OT: N/A    Skilled Nursing: to continue monthly ear irrigation at clinic, AND if MD does not want patient to continue B-12 shots, RN would need discontinuation order.     Pt Provider: CHANDUS    Phone Number Homecare Nurse can be reached at: [phone]    Can we leave a detailed message on this number? YES    Best Time: ASAP     Call taken on 2/24/2022 at 1:26 PM by Benny Witt

## 2022-02-24 NOTE — TELEPHONE ENCOUNTER
I do not want to discontinue the B12 injections completely.  Since his last level was elevated, I would like the following orders: Change B12 injections to every other month.  I do not think monthly visits to the clinic for ear irrigation are necessary.  We can discuss this further at the next clinic visit scheduled for next month.  Thanks.

## 2022-02-24 NOTE — TELEPHONE ENCOUNTER
Reason for Call:  Home Health Care    YAEL Howard with [facility] called regarding (reason for call): clarify and request orders      Note:  Writer successfully fax shot records, office notes and labs from 1/11/22 to nurse.    Orders are needed for this patient.     1.) Patient's Vitamin B12 is high. Does Dr. Albarran still want him to get the shot? Patient has appt tomorrow. Nurse would like to hear back today.    2.) Need new order for christine hose (2 sets). Patient's christine hose are old and has holes so he hasn't been wearing it. Please fax orders to nurse at [phone]. Nurse will take orders to [facility].    3.) Continue ear irrigation once every month.      PT: n/a    OT: n/a    Skilled Nursing: n/a    Pt Provider: Deion    Phone Number Homecare Nurse can be reached at: [phone]    Can we leave a detailed message on this number? YES - confidential    Phone number patient can be reached at: Cell number on file:    Telephone Information:   Mobile [phone]       Best Time: any    Call taken on 2/24/2022 at 11:45 AM by Debra Snow

## 2022-02-25 ENCOUNTER — OFFICE VISIT (OUTPATIENT)
Dept: FAMILY MEDICINE | Facility: CLINIC | Age: 62
End: 2022-02-25
Payer: MEDICARE

## 2022-02-25 VITALS
TEMPERATURE: 97.4 F | BODY MASS INDEX: 40.21 KG/M2 | HEART RATE: 67 BPM | WEIGHT: 256.19 LBS | RESPIRATION RATE: 16 BRPM | HEIGHT: 67 IN | SYSTOLIC BLOOD PRESSURE: 108 MMHG | OXYGEN SATURATION: 97 % | DIASTOLIC BLOOD PRESSURE: 68 MMHG

## 2022-02-25 DIAGNOSIS — Z23 IMMUNIZATION DUE: Primary | ICD-10-CM

## 2022-02-25 PROCEDURE — 96372 THER/PROPH/DIAG INJ SC/IM: CPT | Performed by: FAMILY MEDICINE

## 2022-02-25 PROCEDURE — 90750 HZV VACC RECOMBINANT IM: CPT | Performed by: FAMILY MEDICINE

## 2022-02-25 PROCEDURE — 99207 PR DROP WITH A PROCEDURE: CPT | Performed by: FAMILY MEDICINE

## 2022-02-25 PROCEDURE — 90471 IMMUNIZATION ADMIN: CPT | Performed by: FAMILY MEDICINE

## 2022-02-25 RX ADMIN — CYANOCOBALAMIN 1000 MCG: 1000 INJECTION, SOLUTION INTRAMUSCULAR; SUBCUTANEOUS at 11:16

## 2022-02-25 NOTE — PROGRESS NOTES
"ASSESSMENT and plan   1. Immunization due    - ZOSTER VACCINE RECOMBINANT (Shingrix)            There are no Patient Instructions on file for this visit.    Orders Placed This Encounter   Procedures     REVIEW OF HEALTH MAINTENANCE PROTOCOL ORDERS     ZOSTER VACCINE RECOMBINANT (Shingrix)     There are no discontinued medications.  Administrations This Visit     cyanocobalamin injection 1,000 mcg     Admin Date  02/25/2022 Action  Given Dose  1,000 mcg Route  Intramuscular Administered By  Lucy White              No follow-ups on file.    CHIEF COMPLAINT:  chief complaint    HISTORY OF PRESENT ILLNESS:  Christian is a 61 year old male   Here for immunizations and vitamin B 12 injection    REVIEW OF SYSTEMS:   10 point review  All other systems are negative.    PFSH:      TOBACCO USE:  History   Smoking Status     Never Smoker   Smokeless Tobacco     Never Used       VITALS:  Vitals:    02/25/22 1049   BP: 108/68   BP Location: Left arm   Patient Position: Sitting   Cuff Size: Adult Large   Pulse: 67   Resp: 16   Temp: 97.4  F (36.3  C)   TempSrc: Oral   SpO2: 97%   Weight: 116.2 kg (256 lb 3 oz)   Height: 1.702 m (5' 7\")     Wt Readings from Last 3 Encounters:   02/25/22 116.2 kg (256 lb 3 oz)   01/11/22 117.5 kg (259 lb)   12/13/21 115.4 kg (254 lb 6.4 oz)       PHYSICAL EXAM:  Interactive middle-age male sitting comfortably    DATA REVIEWED:  Additional History from Old Records Summarized (2): 0  Decision to Obtain Records (1): 0  Radiology Tests Summarized or Ordered (1): 0  Labs Reviewed or Ordered (1): 0  Medicine Test Summarized or Ordered (1): 0  Independent Review of EKG or X-RAY(2 each): 0    The visit lasted a total of 7 minutes .    MEDICATIONS:  Current Outpatient Medications   Medication Sig Dispense Refill     allopurinol (ZYLOPRIM) 100 MG tablet Take 1 tablet (100 mg) by mouth daily 90 tablet 3     erythromycin (ROMYCIN) 5 MG/GM ophthalmic ointment APPLY A SMALL AMOUNT IN BOTH EYES QAM.(5 MINUTES " AFTER EYE DROPS) 3.5 g 11     famotidine (PEPCID) 20 MG tablet Take 1 tablet (20 mg) by mouth 2 times daily 180 tablet 3     hypromellose (GENTEAL SEVERE) ophthalmic gel 0.3% Administer to both eyes 2 (two) times a day.       latanoprost (XALATAN) 0.005 % ophthalmic solution INSTILL 1 DROP INTO THE RIGHT EYE EVERY EVENING       levothyroxine (SYNTHROID/LEVOTHROID) 75 MCG tablet Take 1 tablet (75 mcg) by mouth daily 90 tablet 3     polyvinyl alcohol (LIQUIFILM TEARS) 1.4 % ophthalmic solution 1 drop into both eyes 2 times daily.       triamcinolone (KENALOG) 0.1 % external cream APPLY TOPICALLY TO AFFECTED AREA(S) ONCE DAILY AS NEEDED FOR DRY SKIN 45 g 3

## 2022-03-04 ENCOUNTER — TRANSFERRED RECORDS (OUTPATIENT)
Dept: HEALTH INFORMATION MANAGEMENT | Facility: CLINIC | Age: 62
End: 2022-03-04
Payer: MEDICARE

## 2022-04-18 ENCOUNTER — OFFICE VISIT (OUTPATIENT)
Dept: FAMILY MEDICINE | Facility: CLINIC | Age: 62
End: 2022-04-18
Payer: MEDICARE

## 2022-04-18 VITALS
WEIGHT: 263 LBS | TEMPERATURE: 98.1 F | RESPIRATION RATE: 20 BRPM | SYSTOLIC BLOOD PRESSURE: 110 MMHG | OXYGEN SATURATION: 99 % | HEIGHT: 67 IN | HEART RATE: 59 BPM | DIASTOLIC BLOOD PRESSURE: 64 MMHG | BODY MASS INDEX: 41.28 KG/M2

## 2022-04-18 DIAGNOSIS — Q90.9 TRISOMY 21, DOWN SYNDROME: Primary | ICD-10-CM

## 2022-04-18 DIAGNOSIS — E53.8 VITAMIN B12 DEFICIENCY (NON ANEMIC): ICD-10-CM

## 2022-04-18 DIAGNOSIS — E66.01 MORBID OBESITY (H): ICD-10-CM

## 2022-04-18 PROBLEM — F03.90 DEMENTIA (H): Status: RESOLVED | Noted: 2020-12-31 | Resolved: 2022-04-18

## 2022-04-18 PROCEDURE — 99213 OFFICE O/P EST LOW 20 MIN: CPT | Performed by: FAMILY MEDICINE

## 2022-04-18 NOTE — PROGRESS NOTES
ASSESMENT AND PLAN:  Diagnoses and all orders for this visit:  Trisomy 21, Down syndrome  Stable.  Continue current plan.  Morbid obesity (H)  Regular walking, healthy eating.  Vitamin B12 deficiency (non anemic)  Injection given today and he will go on every other month injections given that his levels had been high on monthly injections.    Reviewed the risks and benefits of the treatment plan with the patient and/or caregiver and we discussed indications for routine and emergent follow-up.        SUBJECTIVE: 61-year-old male gets his ears clogged with wax periodically.  He has Down syndrome and is able to communicate basically but not in great detail.  He uses hearing aids and this may be a complicating factor with the wax.  He does report that he has been having some ear discomfort but its unclear how well he is understanding and answering questions accurately.  Caregiving team had been giving him monthly B12 injections in the past but we reduced it to every other month with his most recent level being significantly elevated.  This is for history of B12 deficiency.    No past medical history on file.  Patient Active Problem List   Diagnosis     Trisomy 21, Down syndrome     Morbid obesity (H)     Acquired hypothyroidism     Osteoarthritis of both knees, unspecified osteoarthritis type     Gout     Vitamin B12 deficiency (non anemic)     MADELAINE (obstructive sleep apnea)     Bilateral impacted cerumen     GERD (gastroesophageal reflux disease)     Mixed conductive and sensorineural hearing loss, bilateral     Current Outpatient Medications   Medication Sig Dispense Refill     allopurinol (ZYLOPRIM) 100 MG tablet Take 1 tablet (100 mg) by mouth daily 90 tablet 3     erythromycin (ROMYCIN) 5 MG/GM ophthalmic ointment APPLY A SMALL AMOUNT IN BOTH EYES QAM.(5 MINUTES AFTER EYE DROPS) 3.5 g 11     famotidine (PEPCID) 20 MG tablet Take 1 tablet (20 mg) by mouth 2 times daily 180 tablet 3     hypromellose (GENTEAL SEVERE)  "ophthalmic gel 0.3% Administer to both eyes 2 (two) times a day.       latanoprost (XALATAN) 0.005 % ophthalmic solution INSTILL 1 DROP INTO THE RIGHT EYE EVERY EVENING       levothyroxine (SYNTHROID/LEVOTHROID) 75 MCG tablet Take 1 tablet (75 mcg) by mouth daily 90 tablet 3     polyvinyl alcohol (LIQUIFILM TEARS) 1.4 % ophthalmic solution 1 drop into both eyes 2 times daily.       triamcinolone (KENALOG) 0.1 % external cream APPLY TOPICALLY TO AFFECTED AREA(S) ONCE DAILY AS NEEDED FOR DRY SKIN 45 g 3     History   Smoking Status     Never Smoker   Smokeless Tobacco     Never Used       OBJECTICE: /64 (BP Location: Right arm)   Pulse 59   Temp 98.1  F (36.7  C) (Oral)   Resp 20   Ht 1.702 m (5' 7\")   Wt 119.3 kg (263 lb)   SpO2 99%   BMI 41.19 kg/m       No results found for this or any previous visit (from the past 24 hour(s)).     GEN-alert, in no acute distress   HEENT-mild to moderate wax buildup in both ear canals, visualized portion of the tympanic membrane appears normal.  Ear irrigation done today by nursing staff to help with the wax buildup.   CV-regular rate and rhythm   RESP-lungs clear     Christian Albarran MD   "

## 2022-05-18 ENCOUNTER — TELEPHONE (OUTPATIENT)
Dept: FAMILY MEDICINE | Facility: CLINIC | Age: 62
End: 2022-05-18
Payer: MEDICARE

## 2022-05-18 NOTE — TELEPHONE ENCOUNTER
Reason for Call:  Form, our goal is to have forms completed with 72 hours, however, some forms may require a visit or additional information.    Type of letter, form or note:  knee high compression stocking 15-20 mmhg pairs wears    Who is the form from?: [employer] (if other please explain)    Where did the form come from: form was faxed in    What clinic location was the form placed at?: [facility]    Where the form was placed: [facility]    What number is listed as a contact on the form?: fax [phone]       Additional comments:     Call taken on 5/18/2022 at 11:37 AM by Robert Israel

## 2022-05-19 ENCOUNTER — MEDICAL CORRESPONDENCE (OUTPATIENT)
Dept: HEALTH INFORMATION MANAGEMENT | Facility: CLINIC | Age: 62
End: 2022-05-19
Payer: MEDICARE

## 2022-05-23 ENCOUNTER — ALLIED HEALTH/NURSE VISIT (OUTPATIENT)
Dept: FAMILY MEDICINE | Facility: CLINIC | Age: 62
End: 2022-05-23
Payer: MEDICARE

## 2022-05-23 DIAGNOSIS — E53.8 VITAMIN B12 DEFICIENCY (NON ANEMIC): Primary | ICD-10-CM

## 2022-05-23 PROCEDURE — 96372 THER/PROPH/DIAG INJ SC/IM: CPT | Performed by: FAMILY MEDICINE

## 2022-05-23 PROCEDURE — 99207 PR NO CHARGE NURSE ONLY: CPT

## 2022-05-23 RX ADMIN — CYANOCOBALAMIN 1000 MCG: 1000 INJECTION, SOLUTION INTRAMUSCULAR; SUBCUTANEOUS at 15:10

## 2022-05-24 ENCOUNTER — TELEPHONE (OUTPATIENT)
Dept: FAMILY MEDICINE | Facility: CLINIC | Age: 62
End: 2022-05-24
Payer: MEDICARE

## 2022-05-24 ENCOUNTER — MEDICAL CORRESPONDENCE (OUTPATIENT)
Dept: HEALTH INFORMATION MANAGEMENT | Facility: CLINIC | Age: 62
End: 2022-05-24
Payer: MEDICARE

## 2022-05-24 NOTE — TELEPHONE ENCOUNTER
Reason for Call:  Form, our goal is to have forms completed with 72 hours, however, some forms may require a visit or additional information.    Type of letter, form or note:  home care-  To confirm standing order medication list     Who is the form from?: Home care    Where did the form come from: form was faxed in    What clinic location was the form placed at?: [facility]    Where the form was placed: Dr Aubree Padilla's Box/Folder    What number is listed as a contact on the form?: [phone]       Additional comments:     Call taken on 5/24/2022 at 2:28 PM by Mita Peguero

## 2022-05-25 NOTE — TELEPHONE ENCOUNTER
Forms faxed to shakira boss at [facility] fax # [phone] and copy sent to stat scan.     Alia ONTIVEROS

## 2022-06-25 NOTE — PROGRESS NOTES
Assessment & Plan     Bilateral impacted cerumen  I personally removed the cerumen from both ear canals using the lighted curette.  He tolerated the procedure well.  He may continue using the hearing aids and follow-up as needed for this.  - [place] REMOVAL IMPACTED CERUMEN IRRIGATION/LVG UNILAT    Osteoarthritis of both knees, unspecified osteoarthritis type  He has a history of bilateral knee osteoarthritis.  Corticosteroid injections have been helpful for this in the past, but he has not had this done in over a year.  Given his worsening symptoms, especially in the left knee we decided to proceed with injections today.  Potential risks of the procedure were discussed and explained.  I cleaned the area with iodine and alcohol swabs.  I injected 5 mL of lidocaine with 40 mg of Kenalog into each knee joint using an anterior-lateral approach.  He tolerated the procedure well.  - DRAIN/INJECT LARGE JOINT/BURSA-right knee  -  DRAIN/INJECT LARGE JOINT/BURSA-left knee  -Triamcinolone (Kenalog) injection 40 mg-right knee  -Triamcinolone (Kenalog) injection 40 mg-left knee    Vitamin B12 deficiency (non anemic)  He was given a vitamin B12 injection today.    Trisomy 21, Down syndrome  This issue is stable.  He currently lives in a group home.    Morbid obesity (H)  He will continue to work on lifestyle modifications to help with weight loss.    Dermatitis  He may stop using the Aquaphor for the dermatitis symptoms on his feet and try switching to Vaseline.                   No follow-ups on file.    Clifford Padilla, [facility]    Melina Staples is a 61 year old, presenting for the following health issues:  Recheck Medication and Establish Care      History of Present Illness       Reason for visit:  Follow up    He eats 2-3 servings of fruits and vegetables daily.He consumes 0 sweetened beverage(s) daily.He exercises with enough effort to increase his heart rate 20 to 29 minutes per day.  He  exercises with enough effort to increase his heart rate 3 or less days per week.   He is taking medications regularly.         He has a history of bilateral cerumen impaction, hearing loss, trisomy 21, vitamin B12 deficiency, obesity and bilateral osteoarthritis of the knees.  They have noticed worsening hearing which has been due to cerumen impaction in the past.  He comes in approximately once a month to have his ears cleaned out which seems to help.  They have also been noticing worsening pain in his knees, especially the left.  This makes it difficult for him to ambulate.  Occasionally he takes Tylenol for this, but it does not seem to help much.  X-rays in the past were consistent with osteoarthritis.  He has done well with corticosteroid injections in the past and he is interested in having this treatment again if possible.    Review of Systems   Constitutional, HEENT, cardiovascular, pulmonary, gi and gu systems are negative, except as otherwise noted.      Objective    BP 97/56   Pulse 68   Temp 98.6  F (37  C) (Temporal)   Resp 20   Wt 117.9 kg (260 lb)   SpO2 96%   BMI 40.72 kg/m    Body mass index is 40.72 kg/m .  Physical Exam   GENERAL: healthy, alert and no distress  EYES: Eyes grossly normal to inspection, PERRL and conjunctivae and sclerae normal  HENT: Both canals are impacted with cerumen.  Once this was removed the canals and tympanic membranes appeared clear.  NECK: no adenopathy, no asymmetry, masses, or scars and thyroid normal to palpation  RESP: lungs clear to auscultation - no rales, rhonchi or wheezes  CV: regular rate and rhythm, normal S1 S2, no S3 or S4, no murmur, click or rub, no peripheral edema and peripheral pulses strong  MS: There is crepitation and pain with flexion and extension of both knees.  Gait is mildly antalgic.  SKIN: no suspicious lesions or rashes   NEURO: Normal strength and tone, mentation intact and speech normal  PSYCH: mentation appears normal, affect  normal/bright                    .  ..

## 2022-06-29 ENCOUNTER — OFFICE VISIT (OUTPATIENT)
Dept: FAMILY MEDICINE | Facility: CLINIC | Age: 62
End: 2022-06-29
Payer: MEDICARE

## 2022-06-29 VITALS
BODY MASS INDEX: 40.72 KG/M2 | TEMPERATURE: 98.6 F | RESPIRATION RATE: 20 BRPM | SYSTOLIC BLOOD PRESSURE: 97 MMHG | OXYGEN SATURATION: 96 % | WEIGHT: 260 LBS | HEART RATE: 68 BPM | DIASTOLIC BLOOD PRESSURE: 56 MMHG

## 2022-06-29 DIAGNOSIS — L30.9 DERMATITIS: ICD-10-CM

## 2022-06-29 DIAGNOSIS — H61.23 BILATERAL IMPACTED CERUMEN: Primary | ICD-10-CM

## 2022-06-29 DIAGNOSIS — E66.01 MORBID OBESITY (H): ICD-10-CM

## 2022-06-29 DIAGNOSIS — Q90.9 TRISOMY 21, DOWN SYNDROME: ICD-10-CM

## 2022-06-29 DIAGNOSIS — M17.0 OSTEOARTHRITIS OF BOTH KNEES, UNSPECIFIED OSTEOARTHRITIS TYPE: ICD-10-CM

## 2022-06-29 DIAGNOSIS — E53.8 VITAMIN B12 DEFICIENCY (NON ANEMIC): ICD-10-CM

## 2022-06-29 PROCEDURE — 96372 THER/PROPH/DIAG INJ SC/IM: CPT | Performed by: FAMILY MEDICINE

## 2022-06-29 PROCEDURE — 20610 DRAIN/INJ JOINT/BURSA W/O US: CPT | Performed by: FAMILY MEDICINE

## 2022-06-29 PROCEDURE — 99213 OFFICE O/P EST LOW 20 MIN: CPT | Mod: 25 | Performed by: FAMILY MEDICINE

## 2022-06-29 PROCEDURE — 69209 REMOVE IMPACTED EAR WAX UNI: CPT | Performed by: FAMILY MEDICINE

## 2022-06-29 RX ORDER — TRIAMCINOLONE ACETONIDE 40 MG/ML
40 INJECTION, SUSPENSION INTRA-ARTICULAR; INTRAMUSCULAR ONCE
Status: COMPLETED | OUTPATIENT
Start: 2022-06-29 | End: 2022-06-29

## 2022-06-29 RX ORDER — METHYLPREDNISOLONE ACETATE 40 MG/ML
40 INJECTION, SUSPENSION INTRA-ARTICULAR; INTRALESIONAL; INTRAMUSCULAR; SOFT TISSUE ONCE
Status: DISCONTINUED | OUTPATIENT
Start: 2022-06-29 | End: 2022-06-29

## 2022-06-29 RX ADMIN — TRIAMCINOLONE ACETONIDE 40 MG: 40 INJECTION, SUSPENSION INTRA-ARTICULAR; INTRAMUSCULAR at 14:44

## 2022-06-29 RX ADMIN — CYANOCOBALAMIN 1000 MCG: 1000 INJECTION, SOLUTION INTRAMUSCULAR; SUBCUTANEOUS at 11:24

## 2022-06-29 RX ADMIN — TRIAMCINOLONE ACETONIDE 40 MG: 40 INJECTION, SUSPENSION INTRA-ARTICULAR; INTRAMUSCULAR at 14:43

## 2022-08-03 ENCOUNTER — TELEPHONE (OUTPATIENT)
Dept: FAMILY MEDICINE | Facility: CLINIC | Age: 62
End: 2022-08-03

## 2022-08-03 ENCOUNTER — OFFICE VISIT (OUTPATIENT)
Dept: FAMILY MEDICINE | Facility: CLINIC | Age: 62
End: 2022-08-03
Payer: MEDICARE

## 2022-08-03 VITALS
TEMPERATURE: 98 F | BODY MASS INDEX: 40.41 KG/M2 | SYSTOLIC BLOOD PRESSURE: 112 MMHG | OXYGEN SATURATION: 98 % | DIASTOLIC BLOOD PRESSURE: 59 MMHG | WEIGHT: 258 LBS | HEART RATE: 58 BPM

## 2022-08-03 DIAGNOSIS — E53.8 VITAMIN B12 DEFICIENCY (NON ANEMIC): ICD-10-CM

## 2022-08-03 DIAGNOSIS — H61.23 BILATERAL IMPACTED CERUMEN: Primary | ICD-10-CM

## 2022-08-03 DIAGNOSIS — Q90.9 TRISOMY 21, DOWN SYNDROME: ICD-10-CM

## 2022-08-03 DIAGNOSIS — Z66 DNR NO CODE (DO NOT RESUSCITATE): Primary | ICD-10-CM

## 2022-08-03 DIAGNOSIS — K90.41 GLUTEN INTOLERANCE: ICD-10-CM

## 2022-08-03 DIAGNOSIS — Z23 ENCOUNTER FOR IMMUNIZATION: ICD-10-CM

## 2022-08-03 PROCEDURE — 0064A PR ADMIN COVID VAC MODERNA, 0.25ML BOOSTER: CPT | Performed by: FAMILY MEDICINE

## 2022-08-03 PROCEDURE — 69209 REMOVE IMPACTED EAR WAX UNI: CPT | Performed by: FAMILY MEDICINE

## 2022-08-03 PROCEDURE — 99213 OFFICE O/P EST LOW 20 MIN: CPT | Mod: 25 | Performed by: FAMILY MEDICINE

## 2022-08-03 PROCEDURE — 91306 COVID-19,PF,MODERNA (18+ YRS BOOSTER .25ML): CPT | Performed by: FAMILY MEDICINE

## 2022-08-03 NOTE — PROGRESS NOTES
"  Assessment & Plan     Bilateral impacted cerumen  I personally removed a large amount of cerumen from both ear canals which she tolerated well.  He will continue to follow-up with his audiologist regarding hearing loss and his hearing aid.  - [place] REMOVAL IMPACTED CERUMEN IRRIGATION/LVG UNILAT    Trisomy 21, Down syndrome  This issue is stable.  He lives in a group home.    Gluten intolerance  He has a history of gluten intolerance and eats a gluten-free diet.    Vitamin B12 deficiency (non anemic)  He has a history of vitamin B12 deficiency.  We have been spacing out the B12 injections to every 3 months since his levels have been so high.    Encounter for immunization  - COVID-19,PF,MODERNA (18+ YRS BOOSTER .25ML)  - [place] ADMIN COVID VAC MODERNA, 0.25ML BOOSTER             BMI:   Estimated body mass index is 40.41 kg/m  as calculated from the following:    Height as of 4/18/22: 1.702 m (5' 7\").    Weight as of this encounter: 117 kg (258 lb).   Weight management plan: Discussed healthy diet and exercise guidelines        No follow-ups on file.    Clifford Padilla DO  [facility]   Jhoan is a 61 year old, presenting for the following health issues:  Ear Problem      History of Present Illness       Reason for visit:  BiLatHe exercises with enough effort to increase his heart rate 30 to 60 minutes per day.  He exercises with enough effort to increase his heart rate 4 days per week.   He is taking medications regularly.          Concern - Ear problem   Onset: More than   Description: Wax build up in the Both Ears   Intensity: mild  Progression of Symptoms:  same  Accompanying Signs & Symptoms: None   Previous history of similar problem: Yes   Precipitating factors:        Worsened by: None   Alleviating factors:        Improved by: None  Therapies tried and outcome: None      He would also like a DNR order form signed.   Also discuss about Gluten Intolerance. Caregiver noticed that " after eating foods with gluten, He has bad diarrhea and      Review of Systems   Constitutional, HEENT, cardiovascular, pulmonary, gi and gu systems are negative, except as otherwise noted.      Objective    /59   Pulse 58   Temp 98  F (36.7  C) (Temporal)   Wt 117 kg (258 lb)   SpO2 98%   BMI 40.41 kg/m    Body mass index is 40.41 kg/m .  Physical Exam   GENERAL: healthy, alert and no distress  EYES: Eyes grossly normal to inspection, PERRL and conjunctivae and sclerae normal  HENT: Both canals impacted with cerumen.  Once this was removed the canals and tympanic membranes appeared clear.  Nose and mouth without ulcers or lesions  NECK: no adenopathy, no asymmetry, masses, or scars and thyroid normal to palpation  RESP: lungs clear to auscultation - no rales, rhonchi or wheezes  CV: regular rate and rhythm, normal S1 S2, no S3 or S4, no murmur, click or rub, no peripheral edema and peripheral pulses strong  SKIN: no suspicious lesions or rashes  NEURO: Normal strength and tone, mentation intact and speech normal  PSYCH: mentation appears normal, affect normal/bright                    .  ..

## 2022-08-03 NOTE — TELEPHONE ENCOUNTER
DE,    Please sign DNR order.  Will send order for stat scanning    Thank you,  Glendy Benton RN

## 2022-08-09 ENCOUNTER — DOCUMENTATION ONLY (OUTPATIENT)
Dept: OTHER | Facility: CLINIC | Age: 62
End: 2022-08-09

## 2022-08-10 ENCOUNTER — DOCUMENTATION ONLY (OUTPATIENT)
Dept: OTHER | Facility: CLINIC | Age: 62
End: 2022-08-10

## 2022-09-07 ENCOUNTER — ALLIED HEALTH/NURSE VISIT (OUTPATIENT)
Dept: FAMILY MEDICINE | Facility: CLINIC | Age: 62
End: 2022-09-07
Payer: MEDICARE

## 2022-09-07 DIAGNOSIS — Z23 NEED FOR PROPHYLACTIC VACCINATION AND INOCULATION AGAINST INFLUENZA: Primary | ICD-10-CM

## 2022-09-07 PROCEDURE — 90682 RIV4 VACC RECOMBINANT DNA IM: CPT

## 2022-09-07 PROCEDURE — G0008 ADMIN INFLUENZA VIRUS VAC: HCPCS

## 2022-09-07 PROCEDURE — 96372 THER/PROPH/DIAG INJ SC/IM: CPT | Performed by: FAMILY MEDICINE

## 2022-09-07 PROCEDURE — 99207 PR NO CHARGE NURSE ONLY: CPT

## 2022-09-07 RX ADMIN — CYANOCOBALAMIN 1000 MCG: 1000 INJECTION, SOLUTION INTRAMUSCULAR; SUBCUTANEOUS at 10:52

## 2022-09-07 NOTE — PROGRESS NOTES
Prior to immunization administration, verified patients identity using patient s name and date of birth. Please see Immunization Activity for additional information.     Screening Questionnaire for Adult Immunization    Are you sick today?   No   Do you have allergies to medications, food, a vaccine component or latex?   No   Have you ever had a serious reaction after receiving a vaccination?   No   Do you have a long-term health problem with heart, lung, kidney, or metabolic disease (e.g., diabetes), asthma, a blood disorder, no spleen, complement component deficiency, a cochlear implant, or a spinal fluid leak?  Are you on long-term aspirin therapy?   No   Do you have cancer, leukemia, HIV/AIDS, or any other immune system problem?   No   Do you have a parent, brother, or sister with an immune system problem?   No   In the past 3 months, have you taken medications that affect  your immune system, such as prednisone, other steroids, or anticancer drugs; drugs for the treatment of rheumatoid arthritis, Crohn s disease, or psoriasis; or have you had radiation treatments?   No   Have you had a seizure, or a brain or other nervous system problem?   No   During the past year, have you received a transfusion of blood or blood    products, or been given immune (gamma) globulin or antiviral drug?   No   For women: Are you pregnant or is there a chance you could become       pregnant during the next month?   No   Have you received any vaccinations in the past 4 weeks?   No     Immunization questionnaire answers were all negative.        Per orders of Dr. Mak, injection of flublok given by Joe Ritchie CMA. Patient instructed to remain in clinic for 15 minutes afterwards, and to report any adverse reaction to me immediately.       Screening performed by Joe Ritchie CMA on 9/7/2022 at 10:52 AM.

## 2022-10-05 ENCOUNTER — ALLIED HEALTH/NURSE VISIT (OUTPATIENT)
Dept: FAMILY MEDICINE | Facility: CLINIC | Age: 62
End: 2022-10-05
Payer: MEDICARE

## 2022-10-05 ENCOUNTER — OFFICE VISIT (OUTPATIENT)
Dept: FAMILY MEDICINE | Facility: CLINIC | Age: 62
End: 2022-10-05

## 2022-10-05 VITALS — RESPIRATION RATE: 16 BRPM

## 2022-10-05 DIAGNOSIS — E53.8 VITAMIN B12 DEFICIENCY: ICD-10-CM

## 2022-10-05 DIAGNOSIS — Q90.9 TRISOMY 21, DOWN SYNDROME: ICD-10-CM

## 2022-10-05 DIAGNOSIS — E53.8 VITAMIN B12 DEFICIENCY: Primary | ICD-10-CM

## 2022-10-05 DIAGNOSIS — H61.23 BILATERAL IMPACTED CERUMEN: Primary | ICD-10-CM

## 2022-10-05 PROCEDURE — 96372 THER/PROPH/DIAG INJ SC/IM: CPT | Performed by: FAMILY MEDICINE

## 2022-10-05 PROCEDURE — 99213 OFFICE O/P EST LOW 20 MIN: CPT | Mod: 25 | Performed by: FAMILY MEDICINE

## 2022-10-05 PROCEDURE — 69209 REMOVE IMPACTED EAR WAX UNI: CPT | Mod: RT | Performed by: FAMILY MEDICINE

## 2022-10-05 PROCEDURE — 99207 PR NO CHARGE NURSE ONLY: CPT

## 2022-10-05 RX ADMIN — CYANOCOBALAMIN 1000 MCG: 1000 INJECTION, SOLUTION INTRAMUSCULAR; SUBCUTANEOUS at 10:07

## 2022-10-05 ASSESSMENT — PAIN SCALES - GENERAL: PAINLEVEL: NO PAIN (0)

## 2022-10-05 NOTE — PROGRESS NOTES
Bilateral impacted cerumen  I personally removed a large amount of cerumen from both ear canals which she tolerated well.  He will continue to follow-up with his audiologist regarding hearing loss and his hearing aid.  - [place] REMOVAL IMPACTED CERUMEN IRRIGATION/LVG UNILAT     Trisomy 21, Down syndrome  This issue is stable.  He lives in a group home.      Vitamin B12 deficiency (non anemic)  He has a history of vitamin B12 deficiency.  We have been spacing out the B12 injections to every 3 months since his levels have been so high.    Subjective   Jhoan is a 62 year old, presenting for the following health issues:  Ear Problem (Cerumen blockage)      HPI           He has a history of Down syndrome, vitamin B12 deficiency, hearing loss and cerumen impaction.  He comes in approximately once a month to have his ears cleaned due to the significant wax buildup that seems to quickly accumulate.  The group home staff member is present and reports that his hearing seems worse over the past couple of weeks.    Review of Systems   Constitutional, HEENT, cardiovascular, pulmonary, gi and gu systems are negative, except as otherwise noted.      Objective    Resp 16   There is no height or weight on file to calculate BMI.  Physical Exam     GENERAL: healthy, alert and no distress  EYES: PERRL and conjunctivae and sclerae normal  HENT: Both canals impacted with cerumen.  Once this was removed the canals and tympanic membranes appeared clear.  Nose and mouth without ulcers or lesions  NECK: no adenopathy, no asymmetry, masses, or scars and thyroid normal to palpation  RESP: lungs clear to auscultation - no rales, rhonchi or wheezes  CV: regular rate and rhythm, normal S1 S2, no S3 or S4, no murmur, click or rub, no peripheral edema and peripheral pulses strong  SKIN: no suspicious lesions or rashes

## 2022-10-05 NOTE — PROGRESS NOTES
Clinic Administered Medication Documentation    Administrations This Visit     cyanocobalamin injection 1,000 mcg     Admin Date  10/05/2022 Action  Given Dose  1,000 mcg Route  Intramuscular Site   Administered By  Amber Luz RN    Ordering Provider: Christian Albarran MD    Patient Supplied?: No                  Injectable Medication Documentation    Patient was given Cyanocobalamin (B-12). Prior to medication administration, verified patients identity using patient s name and date of birth. Please see MAR and medication order for additional information. Patient instructed to remain in clinic for 15 minutes and report any adverse reaction to staff immediately .      Was entire vial of medication used? Yes  Vial/Syringe: Single dose vial  Expiration Date:  01/2024  Was this medication supplied by the patient? No

## 2022-10-31 ENCOUNTER — TELEPHONE (OUTPATIENT)
Dept: FAMILY MEDICINE | Facility: CLINIC | Age: 62
End: 2022-10-31

## 2022-10-31 NOTE — TELEPHONE ENCOUNTER
Forms/Letter Request    Type of form/letter: [facility] [facility]-to review medications     Have you been seen for this request: N/A    Do we have the form/letter: Yes: faxed in    When is form/letter needed by: asap    How would you like the form/letter returned: Fax    Patient Notified form requests are processed in 3-5 business days:No    Okay to leave a detailed message?: No at Other phone number:  [phone] # for [facility]

## 2022-11-02 ENCOUNTER — OFFICE VISIT (OUTPATIENT)
Dept: FAMILY MEDICINE | Facility: CLINIC | Age: 62
End: 2022-11-02
Payer: MEDICARE

## 2022-11-02 ENCOUNTER — MEDICAL CORRESPONDENCE (OUTPATIENT)
Dept: HEALTH INFORMATION MANAGEMENT | Facility: CLINIC | Age: 62
End: 2022-11-02

## 2022-11-02 VITALS
DIASTOLIC BLOOD PRESSURE: 62 MMHG | SYSTOLIC BLOOD PRESSURE: 97 MMHG | OXYGEN SATURATION: 96 % | TEMPERATURE: 98.3 F | WEIGHT: 265.9 LBS | HEART RATE: 64 BPM | RESPIRATION RATE: 16 BRPM | BODY MASS INDEX: 41.65 KG/M2

## 2022-11-02 DIAGNOSIS — Q90.9 TRISOMY 21, DOWN SYNDROME: ICD-10-CM

## 2022-11-02 DIAGNOSIS — H61.23 BILATERAL IMPACTED CERUMEN: Primary | ICD-10-CM

## 2022-11-02 DIAGNOSIS — Z12.11 SCREEN FOR COLON CANCER: ICD-10-CM

## 2022-11-02 DIAGNOSIS — E53.8 VITAMIN B12 DEFICIENCY: ICD-10-CM

## 2022-11-02 PROCEDURE — 99213 OFFICE O/P EST LOW 20 MIN: CPT | Mod: 25 | Performed by: FAMILY MEDICINE

## 2022-11-02 PROCEDURE — 69209 REMOVE IMPACTED EAR WAX UNI: CPT | Performed by: FAMILY MEDICINE

## 2022-11-02 ASSESSMENT — PAIN SCALES - GENERAL: PAINLEVEL: MILD PAIN (3)

## 2022-11-02 NOTE — PROGRESS NOTES
Assessment & Plan     Bilateral impacted cerumen  I personally removed a large amount of cerumen from both ear canals which he tolerated well.  He will continue to follow-up with his audiologist regarding hearing loss and his hearing aid.  - [place] REMOVAL IMPACTED CERUMEN IRRIGATION/LVG UNILAT     Trisomy 21, Down syndrome  This issue is stable.  He lives in a group home.      Vitamin B12 deficiency (non anemic)  He has a history of vitamin B12 deficiency.  We have been spacing out the B12 injections to every 3 months since his levels have been so high.  He will be due for his next vitamin B12 injection in January.    Screen for colon cancer  - Fecal colorectal cancer screen FIT - Future (S+30); Future                 Return in about 4 weeks (around 11/30/2022) for Follow up.    Clifford Padilla [facility]    Melina Staples is a 62 year old, presenting for the following health issues:  Ear Problem and Medication Injection (B12)      History of Present Illness       Reason for visit:  Ear cleaning & b-12 shot    He eats 2-3 servings of fruits and vegetables daily.He consumes 0 sweetened beverage(s) daily.He exercises with enough effort to increase his heart rate 20 to 29 minutes per day.  He exercises with enough effort to increase his heart rate 4 days per week.   He is taking medications regularly.             He has a history of Down syndrome, vitamin B12 deficiency, hearing loss and cerumen impaction.  He comes in approximately once a month to have his ears cleaned due to the significant wax buildup that seems to quickly accumulate.  The group home staff member is present and reports that his hearing seems worse over the past couple of weeks.    Review of Systems   Constitutional, HEENT, cardiovascular, pulmonary, gi and gu systems are negative, except as otherwise noted.      Objective    BP 97/62   Pulse 64   Temp 98.3  F (36.8  C) (Oral)   Resp 16   Wt 120.6 kg (265 lb 14.4  oz)   SpO2 96%   BMI 41.65 kg/m    Body mass index is 41.65 kg/m .  Physical Exam     GENERAL: healthy, alert and no distress  EYES: PERRL and conjunctivae and sclerae normal  HENT: Both canals impacted with cerumen.  Once this was removed the canals and tympanic membranes appeared clear.  Nose and mouth without ulcers or lesions  NECK: no adenopathy, no asymmetry, masses, or scars and thyroid normal to palpation  RESP: lungs clear to auscultation - no rales, rhonchi or wheezes  CV: regular rate and rhythm, normal S1 S2, no S3 or S4, no murmur, click or rub, no peripheral edema and peripheral pulses strong  SKIN: no suspicious lesions or rashes

## 2022-12-07 ENCOUNTER — OFFICE VISIT (OUTPATIENT)
Dept: FAMILY MEDICINE | Facility: CLINIC | Age: 62
End: 2022-12-07
Payer: MEDICARE

## 2022-12-07 VITALS
RESPIRATION RATE: 16 BRPM | TEMPERATURE: 98.2 F | SYSTOLIC BLOOD PRESSURE: 91 MMHG | DIASTOLIC BLOOD PRESSURE: 61 MMHG | WEIGHT: 263 LBS | OXYGEN SATURATION: 98 % | BODY MASS INDEX: 41.19 KG/M2 | HEART RATE: 64 BPM

## 2022-12-07 DIAGNOSIS — E53.8 VITAMIN B12 DEFICIENCY: ICD-10-CM

## 2022-12-07 DIAGNOSIS — H61.23 BILATERAL IMPACTED CERUMEN: Primary | ICD-10-CM

## 2022-12-07 DIAGNOSIS — Q90.9 TRISOMY 21, DOWN SYNDROME: ICD-10-CM

## 2022-12-07 PROCEDURE — 0134A COVID-19 VACCINE BIVALENT BOOSTER 18+ (MODERNA): CPT | Performed by: FAMILY MEDICINE

## 2022-12-07 PROCEDURE — 91313 COVID-19 VACCINE BIVALENT BOOSTER 18+ (MODERNA): CPT | Performed by: FAMILY MEDICINE

## 2022-12-07 PROCEDURE — 69210 REMOVE IMPACTED EAR WAX UNI: CPT | Performed by: FAMILY MEDICINE

## 2022-12-07 PROCEDURE — 99213 OFFICE O/P EST LOW 20 MIN: CPT | Mod: 25 | Performed by: FAMILY MEDICINE

## 2022-12-07 ASSESSMENT — PAIN SCALES - GENERAL: PAINLEVEL: MODERATE PAIN (4)

## 2022-12-07 NOTE — PROGRESS NOTES
Bilateral impacted cerumen  I personally removed a large amount of cerumen from both ear canals using the lighted curette which he tolerated well.  He will continue to follow-up with his audiologist regarding hearing loss and his hearing aid.  - [place] REMOVAL IMPACTED CERUMEN IRRIGATION/LVG UNILAT     Trisomy 21, Down syndrome  This issue is stable.  He lives in a group home.      Vitamin B12 deficiency (non anemic)  He has a history of vitamin B12 deficiency.  We have been spacing out the B12 injections to every 3 months since his levels have been so high.  He will be due for his next vitamin B12 injection in January Subjective   Jhoan is a 62 year old, presenting for the following health issues:  Ear Problem      HPI           He has a history of Down syndrome, vitamin B12 deficiency, hearing loss and cerumen impaction.  He comes in approximately once a month to have his ears cleaned due to the significant wax buildup that seems to quickly accumulate.  The group home staff member is present and reports that his hearing seems worse over the past couple of weeks.    Review of Systems   Constitutional, HEENT, cardiovascular, pulmonary, GI, , musculoskeletal, neuro, skin, endocrine and psych systems are negative, except as otherwise noted.      Objective    BP 91/61   Pulse 64   Temp 98.2  F (36.8  C) (Oral)   Resp 16   Wt 119.3 kg (263 lb)   SpO2 98%   BMI 41.19 kg/m    Body mass index is 41.19 kg/m .  Physical Exam       GENERAL: healthy, alert and no distress  EYES: PERRL and conjunctivae and sclerae normal  HENT: Both canals impacted with cerumen.  Once this was removed the canals and tympanic membranes appeared clear.  Nose and mouth without ulcers or lesions  NECK: no adenopathy, no asymmetry, masses, or scars and thyroid normal to palpation  RESP: lungs clear to auscultation - no rales, rhonchi or wheezes  CV: regular rate and rhythm, normal S1 S2, no S3 or S4, no murmur, click or rub, no peripheral  edema and peripheral pulses strong  SKIN: no suspicious lesions or rashes

## 2023-01-13 ENCOUNTER — VIRTUAL VISIT (OUTPATIENT)
Dept: URGENT CARE | Facility: CLINIC | Age: 63
End: 2023-01-13
Payer: MEDICARE

## 2023-01-13 ENCOUNTER — TELEPHONE (OUTPATIENT)
Dept: FAMILY MEDICINE | Facility: CLINIC | Age: 63
End: 2023-01-13

## 2023-01-13 DIAGNOSIS — U07.1 COVID-19: Primary | ICD-10-CM

## 2023-01-13 PROCEDURE — 99443 PR PHYSICIAN TELEPHONE EVALUATION 21-30 MIN: CPT | Mod: CS

## 2023-01-13 NOTE — TELEPHONE ENCOUNTER
Tracy manager for group home calling to [facility] about patient having covid and to see about eligibility for paxlovid.     RN COVID TREATMENT VISIT  01/13/23    Christian Bobby  62 year old  Current weight? 120.6kg.    Has the patient been seen by a primary care provider at an [facility] or [facility] within the past two years? Yes.   Have you been in close proximity to/do you have a known exposure to a person with a confirmed case of influenza? No.     Date of positive COVID test (PCR or at home)?  1/12    Current COVID symptoms: cough and fatigue    Date COVID symptoms began: 1/10    Do you have any of the following conditions that place you at risk of being very sick from COVID-19? disabilities    Is patient eligible to continue? Yes, established patient, 12 years or older weighing at least 88.2 lbs, who has COVID symptoms that started in the past 5 days and is at risk for being very sick from COVID-19.       Have you received monoclonal antibodies or oral antiviral medications since testing positive to COVID-19? No    Are you currently hospitalized for COVID-19? No    Do you have a history of hepatitis? No    Are you currently pregnant or nursing? No    Do you have a clinically significant hypersensitivity to nirmatrelvir, ritonavir, or molnupiravir? No    Do you have any history of severe renal impairment (eGFR < 30mL/min)? No    Do you have any history of hepatic impairment or abnormalities (e.g. hepatic panel, ALT, AST, ALK Phos, bilirubin)? No    Have you had a coronary stent placed in the previous 6 months? No    Is patient eligible to continue?   Yes, patient meets all eligibility requirements for the RN COVID treatment (as denoted by all no responses above).     Current Outpatient Medications   Medication Sig Dispense Refill     allopurinol (ZYLOPRIM) 100 MG tablet Take 1 tablet (100 mg) by mouth daily 90 tablet 3     erythromycin (ROMYCIN) 5 MG/GM ophthalmic ointment APPLY A  SMALL AMOUNT IN BOTH EYES QAM.(5 MINUTES AFTER EYE DROPS) 3.5 g 11     famotidine (PEPCID) 20 MG tablet Take 1 tablet (20 mg) by mouth 2 times daily 180 tablet 3     hypromellose (GENTEAL SEVERE) ophthalmic gel 0.3% Administer to both eyes 2 (two) times a day.       latanoprost (XALATAN) 0.005 % ophthalmic solution INSTILL 1 DROP INTO THE RIGHT EYE EVERY EVENING       levothyroxine (SYNTHROID/LEVOTHROID) 75 MCG tablet Take 1 tablet (75 mcg) by mouth daily 90 tablet 3     polyvinyl alcohol (LIQUIFILM TEARS) 1.4 % ophthalmic solution 1 drop into both eyes 2 times daily.       triamcinolone (KENALOG) 0.1 % external cream APPLY TOPICALLY TO AFFECTED AREA(S) ONCE DAILY AS NEEDED FOR DRY SKIN 45 g 3       Medications from List 1 of the standing order (on medications that exclude the use of Paxlovid) that patient is taking: NONE. Is patient taking [place]'s Wort? No  Is patient taking [place]'s Wort or any meds from List 1? No.   Medications from List 2 of the standing order (on meds that provider needs to adjust) that patient is taking: erythromycin (Erythrocin, E.E.S.), explained a provider visit is necessary to discuss medication adjustments while taking Paxlovid. Is patient on any of the meds from List 2? Yes.Roberto Villaseñor RN        Tracy at [facility] callback- [phone].   Roberto Villaseñor RN   [facility]

## 2023-01-13 NOTE — PROGRESS NOTES
Jhoan is a 62 year old who is being evaluated via a billable telephone visit.        Assessment & Plan     (U07.1) COVID-19  (primary encounter diagnosis)    Plan: nirmatrelvir and ritonavir (PAXLOVID) therapy         pack  Normal kidney function  No drug interactions        DENEEN Armando CNP  Virtual Urgent Care  [facility] VIRTUAL URGENT CARE    Subjective   Jhoan is a 62 year old presenting for the following health issues:  COVID    HPI   Di  Sx 3 days ago and consist of sore throat fatigue runny nose  Diagnosed covid at home (lives in group home)  No fever sob wheezing chest pain  Hydrated        Objective         Vitals:  No vitals were obtained today due to virtual visit.    Physical Exam   GENERAL: Healthy, alert and no distress  EYES: Eyes grossly normal to inspection.  No discharge or erythema, or obvious scleral/conjunctival abnormalities.  RESP: No audible wheeze, cough, or visible cyanosis.  No visible retractions or increased work of breathing.    SKIN: Visible skin clear. No significant rash, abnormal pigmentation or lesions.  NEURO: Cranial nerves grossly intact.  Mentation and speech appropriate for age.  PSYCH: Mentation appears normal, affect normal/bright, judgement and insight intact, normal speech and appearance well-groomed.      Telephone time spent 25 minutes    DENEEN Armando CNP

## 2023-01-13 NOTE — TELEPHONE ENCOUNTER
Called Tracy at group home.  States RN she spoke with did not transfer to  to make appointment.    See message below from 1:10 pm.    Gave tracy phone number to schedule treatment visit.    Glendy Benton RN       ED Provider Note    ER PROVIDER NOTE        CHIEF COMPLAINT  Chief Complaint   Patient presents with   • ALOC     Pt was normal last night, but found this morning naked and shivering.  He is also very confused.  Speaking clear words, but not making sense with his sentences.  Temp = 38.3, RA saturation = 89% per EMS driver.       HPI  Mani Louise is a 89 y.o. male who presents to the emergency department complaining of altered mental status and fever.  Patient is unable to provide any cogent history but wife provides the following.  Patient was in normal state of health yesterday alert, went for a walk, no real symptoms.  She awoke last night to him naked wandering around in the house and mumbling he appeared to have fallen as he did have some blood on his temple.  She notes no recent cough or difficulty breathing, no vomiting or diarrhea.  No seizure activity    Patient denies any focal complaints of pain although history is limited.    Patient with history of bladder surgery in November for tumor in [place], also takes Coumadin for past history of PE    REVIEW OF SYSTEMS  Pertinent positives include fever, altered mental status. Pertinent negatives include no vomiting. See HPI for details.  History is limited due to patient's altered mental status  PAST MEDICAL HISTORY   has a past medical history of Pulmonary embolism (HCC).    SURGICAL HISTORY  patient denies any surgical history    FAMILY HISTORY  History reviewed. No pertinent family history.    SOCIAL HISTORY  Social History     Social History   • Marital status:      Spouse name: N/A   • Number of children: N/A   • Years of education: N/A     Social History Main Topics   • Smoking status: Never Smoker   • Smokeless tobacco: Not on file   • Alcohol use No   • Drug use: No   • Sexual activity: Not on file     Other Topics Concern   • Not on file     Social History Narrative   • No narrative on file      History   Drug Use No       CURRENT  "MEDICATIONS  Home Medications     Reviewed by Jess Moreno R.N. (Registered Nurse) on 02/02/19 at 0609  Med List Status: Partial   Medication Last Dose Status   DUTASTERIDE PO  Active   rizatriptan (MAXALT) 5 MG tablet  Active   triazolam (HALCION) 0.25 MG TABS 2/1/2019 Active   Warfarin Sodium (COUMADIN PO) 2/1/2019 Active                ALLERGIES  No Known Allergies    PHYSICAL EXAM  VITAL SIGNS: Pulse 95   Temp (!) 38.3 °C (100.9 °F) (Temporal)   Resp 18   Ht 1.727 m (5' 8\")   Wt 74.9 kg (165 lb 2 oz)   BMI 25.11 kg/m²   Pulse ox interpretation: I interpret this pulse ox as normal.    Constitutional: Alert shaking, ill-appearing  HENT: Small abrasion to the left temple, no Cuevas's, no raccoons, Bilateral external ears normal, Nose normal.   Eyes: Pupils are equal and reactive, Conjunctiva normal, Non-icteric.   Neck: Normal range of motion, No tenderness, Supple, No stridor.   Lymphatic: No lymphadenopathy noted.   Cardiovascular: Tachycardic, regular rhythm, no murmurs.   Thorax & Lungs: Rhonchorous breath sounds, No respiratory distress, No wheezing, No chest tenderness.   Abdomen: Bowel sounds normal, Soft, No tenderness, No masses, No pulsatile masses. No peritoneal signs.  Skin: Warm, Dry, No erythema, No rash.   Back: No bony tenderness, No CVA tenderness.   Extremities: Intact distal pulses, No edema, No tenderness, No cyanosis, Negative Ro's sign.  Musculoskeletal: Good range of motion in all major joints. No tenderness to palpation or major deformities noted.   Neurologic: Alert , patient states words but is confused, will move all extremities to command but unable to participate in more meaningful neurologic exam  Psychiatric: Affect normal, Judgment normal, Mood normal.     DIAGNOSTIC STUDIES / PROCEDURES        LABS  Labs Reviewed   LACTIC ACID - Abnormal; Notable for the following:        Result Value    Lactic Acid 2.1 (*)     All other components within normal limits   LACTIC ACID - " "Abnormal; Notable for the following:     Lactic Acid 2.8 (*)     All other components within normal limits   CBC WITH DIFFERENTIAL - Abnormal; Notable for the following:     WBC 11.9 (*)     RBC 3.76 (*)     Hematocrit 39.5 (*)     .1 (*)     MCH 37.5 (*)     MCHC 35.7 (*)     RDW 50.5 (*)     Neutrophils-Polys 84.40 (*)     Lymphocytes 9.70 (*)     Neutrophils (Absolute) 10.02 (*)     All other components within normal limits   COMP METABOLIC PANEL - Abnormal; Notable for the following:     Sodium 134 (*)     Glucose 111 (*)     All other components within normal limits   URINALYSIS - Abnormal; Notable for the following:     Protein 100 (*)     Occult Blood Moderate (*)     All other components within normal limits    Narrative:     Indication for culture:->Emergency Room Patient   URINE MICROSCOPIC (W/UA) - Abnormal; Notable for the following:     WBC 2-5 (*)     RBC 20-50 (*)     All other components within normal limits    Narrative:     Indication for culture:->Emergency Room Patient   URINE CULTURE(NEW)    Narrative:     Indication for culture:->Emergency Room Patient   BLOOD CULTURE    Narrative:     Per Hospital Policy: Only change Specimen Src: to \"Line\" if  specified by physician order.   BLOOD CULTURE    Narrative:     Per Hospital Policy: Only change Specimen Src: to \"Line\" if  specified by physician order.   INFLUENZA A/B BY PCR    Narrative:     Indication for culture:->Emergency Room Patient   ESTIMATED GFR   PROTHROMBIN TIME   APTT       All labs reviewed by me.    RADIOLOGY  DX-CHEST-PORTABLE (1 VIEW)   Final Result         1.  Mild pulmonary edema and/or infiltrates   2.  Cardiomegaly   3.  Atherosclerosis      CT-HEAD W/O   Final Result         1.  No acute intracranial abnormality is identified, there are nonspecific white matter changes, commonly associated with small vessel ischemic disease.  Associated mild cerebral atrophy is noted.   2.  Atherosclerosis.        The radiologist's " interpretation of all radiological studies have been reviewed by me.    COURSE & MEDICAL DECISION MAKING  Nursing notes, VS, PMSFHx reviewed in chart.    6:36 AM Patient seen and examined at bedside. Patient will be treated with IV fluids and ceftriaxone. Ordered for sepsis bundle, CT head to evaluate his symptoms.     HYDRATION: Based on the patient's presentation of Sepsis the patient was given IV fluids. IV Hydration was used because oral hydration was not as rapid as required. Upon recheck following hydration, the patient was Improved.     7:14 AM  Patient reevaluated, updated patient and wife on results, additional antibiotics ordered with his chest x-ray    Discussed case with hospitalist for admission        Decision Making:  This is a 89 y.o. male presenting with fever and altered mental status.  On exam he does have some rhonchorous breath sounds consistent with his checks x-ray demonstrating pneumonia.  He is treated with ceftriaxone and azithromycin for community-acquired pneumonia and sepsis.  He has a slight lactic acidosis but no hypotension, and has been given fluid resuscitation for this.  No other focal findings to suggest other source for his infection, negative urinalysis, negative influenza.  Given he is on Coumadin and did fall and hit his head I did obtain CT of his head which was negative as well for any intracranial bleed    Patient admitted in guarded condition    FINAL IMPRESSION  1. Sepsis, due to unspecified organism (HCC)    2. Pneumonia due to infectious organism, unspecified laterality, unspecified part of lung    3. Altered mental status, unspecified altered mental status type         The note accurately reflects work and decisions made by me.  Mani Mo  2/2/2019  7:30 AM

## 2023-01-26 ENCOUNTER — OFFICE VISIT (OUTPATIENT)
Dept: FAMILY MEDICINE | Facility: CLINIC | Age: 63
End: 2023-01-26
Payer: MEDICARE

## 2023-01-26 VITALS
WEIGHT: 257.3 LBS | RESPIRATION RATE: 20 BRPM | SYSTOLIC BLOOD PRESSURE: 106 MMHG | TEMPERATURE: 97.7 F | HEIGHT: 67 IN | BODY MASS INDEX: 40.38 KG/M2 | OXYGEN SATURATION: 98 % | HEART RATE: 51 BPM | DIASTOLIC BLOOD PRESSURE: 65 MMHG

## 2023-01-26 DIAGNOSIS — Z12.11 SCREEN FOR COLON CANCER: ICD-10-CM

## 2023-01-26 DIAGNOSIS — E66.01 MORBID OBESITY (H): ICD-10-CM

## 2023-01-26 DIAGNOSIS — H90.6 MIXED CONDUCTIVE AND SENSORINEURAL HEARING LOSS, BILATERAL: ICD-10-CM

## 2023-01-26 DIAGNOSIS — M1A.9XX0 CHRONIC GOUT WITHOUT TOPHUS, UNSPECIFIED CAUSE, UNSPECIFIED SITE: ICD-10-CM

## 2023-01-26 DIAGNOSIS — E03.9 ACQUIRED HYPOTHYROIDISM: ICD-10-CM

## 2023-01-26 DIAGNOSIS — Q90.9 TRISOMY 21, DOWN SYNDROME: ICD-10-CM

## 2023-01-26 DIAGNOSIS — H61.23 BILATERAL IMPACTED CERUMEN: ICD-10-CM

## 2023-01-26 DIAGNOSIS — M17.0 OSTEOARTHRITIS OF BOTH KNEES, UNSPECIFIED OSTEOARTHRITIS TYPE: ICD-10-CM

## 2023-01-26 DIAGNOSIS — K21.9 GASTROESOPHAGEAL REFLUX DISEASE, UNSPECIFIED WHETHER ESOPHAGITIS PRESENT: ICD-10-CM

## 2023-01-26 DIAGNOSIS — E53.8 VITAMIN B12 DEFICIENCY: ICD-10-CM

## 2023-01-26 DIAGNOSIS — Z13.220 SCREENING CHOLESTEROL LEVEL: ICD-10-CM

## 2023-01-26 DIAGNOSIS — Z11.4 SCREENING FOR HIV (HUMAN IMMUNODEFICIENCY VIRUS): ICD-10-CM

## 2023-01-26 DIAGNOSIS — Z00.00 ENCOUNTER FOR MEDICARE ANNUAL WELLNESS EXAM: Primary | ICD-10-CM

## 2023-01-26 DIAGNOSIS — G47.33 OSA (OBSTRUCTIVE SLEEP APNEA): ICD-10-CM

## 2023-01-26 LAB
ANION GAP SERPL CALCULATED.3IONS-SCNC: 10 MMOL/L (ref 7–15)
BUN SERPL-MCNC: 15.2 MG/DL (ref 8–23)
CALCIUM SERPL-MCNC: 9.1 MG/DL (ref 8.8–10.2)
CHLORIDE SERPL-SCNC: 103 MMOL/L (ref 98–107)
CHOLEST SERPL-MCNC: 152 MG/DL
CREAT SERPL-MCNC: 0.92 MG/DL (ref 0.67–1.17)
DEPRECATED HCO3 PLAS-SCNC: 28 MMOL/L (ref 22–29)
ERYTHROCYTE [DISTWIDTH] IN BLOOD BY AUTOMATED COUNT: 14 % (ref 10–15)
GFR SERPL CREATININE-BSD FRML MDRD: >90 ML/MIN/1.73M2
GLUCOSE SERPL-MCNC: 89 MG/DL (ref 70–99)
HCT VFR BLD AUTO: 48.2 % (ref 40–53)
HDLC SERPL-MCNC: 57 MG/DL
HGB BLD-MCNC: 16 G/DL (ref 13.3–17.7)
HIV 1+2 AB+HIV1 P24 AG SERPL QL IA: NONREACTIVE
LDLC SERPL CALC-MCNC: 83 MG/DL
MCH RBC QN AUTO: 33.1 PG (ref 26.5–33)
MCHC RBC AUTO-ENTMCNC: 33.2 G/DL (ref 31.5–36.5)
MCV RBC AUTO: 100 FL (ref 78–100)
NONHDLC SERPL-MCNC: 95 MG/DL
PLATELET # BLD AUTO: 203 10E3/UL (ref 150–450)
POTASSIUM SERPL-SCNC: 4.5 MMOL/L (ref 3.4–5.3)
RBC # BLD AUTO: 4.84 10E6/UL (ref 4.4–5.9)
SODIUM SERPL-SCNC: 141 MMOL/L (ref 136–145)
TRIGL SERPL-MCNC: 61 MG/DL
TSH SERPL DL<=0.005 MIU/L-ACNC: 1.78 UIU/ML (ref 0.3–4.2)
URATE SERPL-MCNC: 6 MG/DL (ref 3.4–7)
VIT B12 SERPL-MCNC: 387 PG/ML (ref 232–1245)
WBC # BLD AUTO: 4.8 10E3/UL (ref 4–11)

## 2023-01-26 PROCEDURE — 84443 ASSAY THYROID STIM HORMONE: CPT | Performed by: FAMILY MEDICINE

## 2023-01-26 PROCEDURE — G0439 PPPS, SUBSEQ VISIT: HCPCS | Performed by: FAMILY MEDICINE

## 2023-01-26 PROCEDURE — 80061 LIPID PANEL: CPT | Performed by: FAMILY MEDICINE

## 2023-01-26 PROCEDURE — 69210 REMOVE IMPACTED EAR WAX UNI: CPT | Performed by: FAMILY MEDICINE

## 2023-01-26 PROCEDURE — 36415 COLL VENOUS BLD VENIPUNCTURE: CPT | Performed by: FAMILY MEDICINE

## 2023-01-26 PROCEDURE — 84550 ASSAY OF BLOOD/URIC ACID: CPT | Performed by: FAMILY MEDICINE

## 2023-01-26 PROCEDURE — 87389 HIV-1 AG W/HIV-1&-2 AB AG IA: CPT | Performed by: FAMILY MEDICINE

## 2023-01-26 PROCEDURE — 82607 VITAMIN B-12: CPT | Performed by: FAMILY MEDICINE

## 2023-01-26 PROCEDURE — 99214 OFFICE O/P EST MOD 30 MIN: CPT | Mod: 25 | Performed by: FAMILY MEDICINE

## 2023-01-26 PROCEDURE — 80048 BASIC METABOLIC PNL TOTAL CA: CPT | Performed by: FAMILY MEDICINE

## 2023-01-26 PROCEDURE — 85027 COMPLETE CBC AUTOMATED: CPT | Performed by: FAMILY MEDICINE

## 2023-01-26 ASSESSMENT — ENCOUNTER SYMPTOMS
PALPITATIONS: 0
FREQUENCY: 0
FEVER: 0
PARESTHESIAS: 0
NERVOUS/ANXIOUS: 0
COUGH: 0
DYSURIA: 0
CHILLS: 0
HEMATOCHEZIA: 0
HEARTBURN: 0
SHORTNESS OF BREATH: 0
DIARRHEA: 0
HEMATURIA: 0
EYE PAIN: 0
ARTHRALGIAS: 0
JOINT SWELLING: 0
HEADACHES: 0
NAUSEA: 0
ABDOMINAL PAIN: 0
WEAKNESS: 0
DIZZINESS: 0
MYALGIAS: 0
CONSTIPATION: 0
SORE THROAT: 0

## 2023-01-26 ASSESSMENT — ACTIVITIES OF DAILY LIVING (ADL)
CURRENT_FUNCTION: MEDICATION ADMINISTRATION REQUIRES ASSISTANCE
CURRENT_FUNCTION: TRANSPORTATION REQUIRES ASSISTANCE
CURRENT_FUNCTION: PREPARING MEALS REQUIRES ASSISTANCE
CURRENT_FUNCTION: TELEPHONE REQUIRES ASSISTANCE
CURRENT_FUNCTION: HOUSEWORK REQUIRES ASSISTANCE
CURRENT_FUNCTION: LAUNDRY REQUIRES ASSISTANCE

## 2023-01-26 ASSESSMENT — PAIN SCALES - GENERAL: PAINLEVEL: NO PAIN (0)

## 2023-01-26 NOTE — PROGRESS NOTES
"SUBJECTIVE:   Jhoan is a 62 year old who presents for Preventive Visit.  Patient has been advised of split billing requirements and indicates understanding: Yes  Are you in the first 12 months of your Medicare coverage?  No    Healthy Habits:     In general, how would you rate your overall health?  Good    Frequency of exercise:  None    Do you usually eat at least 4 servings of fruit and vegetables a day, include whole grains    & fiber and avoid regularly eating high fat or \"junk\" foods?  Yes    Taking medications regularly:  Yes    Medication side effects:  None    Ability to successfully perform activities of daily living:  Telephone requires assistance, transportation requires assistance, preparing meals requires assistance, housework requires assistance, laundry requires assistance and medication administration requires assistance    Home Safety:  No safety concerns identified    Hearing Impairment:  No hearing concerns    In the past 6 months, have you been bothered by leaking of urine?  No    In general, how would you rate your overall mental or emotional health?  Good      PHQ-2 Total Score: 0    Additional concerns today:  No    He has a medical history significant for trisomy 21 (Down syndrome), dementia, morbid obesity, hypothyroidism, osteoarthritis of both knees, gout, vitamin B12 deficiency, GERD, obstructive sleep apnea on BiPAP, hearing loss and bilateral impacted cerumen.  He lives in a group home and they report that things seem to be going well for him.  He continues to struggle with knee pain symptoms which have been worse over the past few weeks.  He has had a good response to steroid injections in the past.      Have you ever done Advance Care Planning? (For example, a Health Directive, POLST, or a discussion with a medical provider or your loved ones about your wishes): Yes, advance care planning is on file.       Fall risk  Fallen 2 or more times in the past year?: Yes  Any fall with injury in " the past year?: Yes    Cognitive Screening   1) Repeat 3 items (Leader, Season, Table)    2) Clock draw: Unable to perform  3) 3 item recall: Unable to perform  Results: Unable to perform      Mini-CogTM Copyright ALEXANDRA Roldan. Licensed by the author for use in [facility]; reprinted with permission (johnny@[facility]). All rights reserved.      Do you have sleep apnea, excessive snoring or daytime drowsiness?: yes    Reviewed and updated as needed this visit by clinical staff   Tobacco  Allergies  Meds              Reviewed and updated as needed this visit by Provider                 Social History     Tobacco Use     Smoking status: Never     Smokeless tobacco: Never   Substance Use Topics     Alcohol use: Never     If you drink alcohol do you typically have >3 drinks per day or >7 drinks per week? No    Alcohol Use 1/26/2023   Prescreen: >3 drinks/day or >7 drinks/week? No   Prescreen: >3 drinks/day or >7 drinks/week? -   No flowsheet data found.            Current providers sharing in care for this patient include:   Patient Care Team:  Clifford Wooten DO as PCP - General (Family Medicine)  Clifford Wooten DO as Assigned PCP    The following health maintenance items are reviewed in Epic and correct as of today:  Health Maintenance   Topic Date Due     HIV SCREENING  Never done     COLORECTAL CANCER SCREENING  05/24/2020     MEDICARE ANNUAL WELLNESS VISIT  01/11/2023     ANNUAL REVIEW OF HM ORDERS  01/26/2024     LIPID  12/31/2025     ADVANCE CARE PLANNING  01/26/2028     HEPATITIS C SCREENING  Completed     PHQ-2 (once per calendar year)  Completed     INFLUENZA VACCINE  Completed     ZOSTER IMMUNIZATION  Completed     COVID-19 Vaccine  Completed     Pneumococcal Vaccine: Pediatrics (0 to 5 Years) and At-Risk Patients (6 to 64 Years)  Aged Out     IPV IMMUNIZATION  Aged Out     MENINGITIS IMMUNIZATION  Aged Out     DTAP/TDAP/TD IMMUNIZATION  Discontinued     Lab work is in process  Labs  "reviewed in EPIC          Review of Systems   Constitutional: Negative for chills and fever.   HENT: Negative for congestion, ear pain, hearing loss and sore throat.    Eyes: Negative for pain and visual disturbance.   Respiratory: Negative for cough and shortness of breath.    Cardiovascular: Negative for chest pain, palpitations and peripheral edema.   Gastrointestinal: Negative for abdominal pain, constipation, diarrhea, heartburn, hematochezia and nausea.   Genitourinary: Negative for dysuria, frequency, genital sores, hematuria, impotence, penile discharge and urgency.   Musculoskeletal: Negative for arthralgias, joint swelling and myalgias.   Skin: Negative for rash.   Neurological: Negative for dizziness, weakness, headaches and paresthesias.   Psychiatric/Behavioral: Negative for mood changes. The patient is not nervous/anxious.      Constitutional, HEENT, cardiovascular, pulmonary, GI, , musculoskeletal, neuro, skin, endocrine and psych systems are negative, except as otherwise noted.    OBJECTIVE:   /65   Pulse 51   Temp 97.7  F (36.5  C) (Temporal)   Resp 20   Ht 1.702 m (5' 7\")   Wt 116.7 kg (257 lb 4.8 oz)   SpO2 98%   BMI 40.30 kg/m   Estimated body mass index is 40.3 kg/m  as calculated from the following:    Height as of this encounter: 1.702 m (5' 7\").    Weight as of this encounter: 116.7 kg (257 lb 4.8 oz).  Physical Exam  GENERAL: healthy, alert and no distress  EYES: Eyes grossly normal to inspection, PERRL and conjunctivae and sclerae normal  HENT: Both canals impacted with cerumen.  Once this was removed the canals and tympanic membranes appeared clear.  Nose and mouth without ulcers or lesions  NECK: no adenopathy, no asymmetry, masses, or scars and thyroid normal to palpation  RESP: lungs clear to auscultation - no rales, rhonchi or wheezes  CV: regular rate and rhythm, normal S1 S2, no S3 or S4, no murmur, click or rub, no peripheral edema and peripheral pulses " strong  ABDOMEN: soft, nontender, no hepatosplenomegaly, no masses and bowel sounds normal  MS: He has some difficulty ambulating secondary to knee pain symptoms.  SKIN: no suspicious lesions or rashes  NEURO: Normal strength and tone  PSYCH: affect normal/bright    Diagnostic Test Results:  Labs reviewed in Epic    ASSESSMENT / PLAN:   1. Encounter for Medicare annual wellness exam  I encouraged him to get regular physical activity and eat a healthy diet.  We are going to check nonfasting labs as listed below.    2. Trisomy 21, Down syndrome  This issue is stable.  He is currently living in a group home.    3. Morbid obesity (H)  We discussed lifestyle modifications to help with weight loss.  - Basic metabolic panel  (Ca, Cl, CO2, Creat, Gluc, K, Na, BUN); Future  - Basic metabolic panel  (Ca, Cl, CO2, Creat, Gluc, K, Na, BUN)    4. Bilateral impacted cerumen  I personally removed a large amount of cerumen from both ear canals using the lighted curette.  He tolerated this procedure well.  - MD REMOVAL IMPACTED CERUMEN IRRIGATION/LVG UNILAT  - MD REMOVAL IMPACTED CERUMEN IRRIGATION/LVG UNILAT    5. MADELAINE (obstructive sleep apnea)  Continue BiPAP    6. Mixed conductive and sensorineural hearing loss, bilateral  Has hearing aids and follows with audiology.    7. Gastroesophageal reflux disease, unspecified whether esophagitis present  Stable on Pepcid 20 mg twice daily.    8. Acquired hypothyroidism  We are going to recheck a TSH level today.  We can make adjustments to the levothyroxine once the results are back.  He is currently on levothyroxine 75 mcg daily.  - TSH with free T4 reflex; Future  - TSH with free T4 reflex    9. Chronic gout without tophus, unspecified cause, unspecified site  This issue has been stable on allopurinol 100 mg daily.  He is due to have his uric acid level rechecked.  - Uric acid; Future  - Uric acid    10. Osteoarthritis of both knees, unspecified osteoarthritis type  We discussed  "treatment options and reviewed x-ray results of the knees that he has had in the past which are consistent with osteoarthritis.  He is planning to schedule follow-up appointment to discuss repeating corticosteroid injections which have been helpful in the past..    11. Vitamin B12 deficiency  We are going to recheck his vitamin B12 level and a CBC today.  - Vitamin B12; Future  - CBC with platelets; Future  - Vitamin B12  - CBC with platelets    12. Screening cholesterol level  - Lipid Profile (Chol, Trig, HDL, LDL calc); Future  - Lipid Profile (Chol, Trig, HDL, LDL calc)    13. Screen for colon cancer  - Fecal colorectal cancer screen FIT - Future (S+30); Future  - Fecal colorectal cancer screen FIT - Future (S+30)    14. Screening for HIV (human immunodeficiency virus)  - HIV Antigen Antibody Combo; Future  - HIV Antigen Antibody Combo      Patient has been advised of split billing requirements and indicates understanding: Yes      COUNSELING:  Reviewed preventive health counseling, as reflected in patient instructions       Regular exercise       Healthy diet/nutrition       HIV screening for high risk patient       Colon cancer screening      BMI:   Estimated body mass index is 40.3 kg/m  as calculated from the following:    Height as of this encounter: 1.702 m (5' 7\").    Weight as of this encounter: 116.7 kg (257 lb 4.8 oz).   Weight management plan: Discussed healthy diet and exercise guidelines      He reports that he has never smoked. He has never used smokeless tobacco.      Appropriate preventive services were discussed with this patient, including applicable screening as appropriate for cardiovascular disease, diabetes, osteopenia/osteoporosis, and glaucoma.  As appropriate for age/gender, discussed screening for colorectal cancer, prostate cancer, breast cancer, and cervical cancer. Checklist reviewing preventive services available has been given to the patient.    Reviewed patients plan of care and " provided an AVS. The Basic Care Plan (routine screening as documented in Health Maintenance) for Christian meets the Care Plan requirement. This Care Plan has been established and reviewed with the Patient and caregiver.          Clifford Padilla [facility]    Identified Health Risks:

## 2023-01-26 NOTE — LETTER
January 31, 2023      Jhoan Bobby  [address] [facility] [place] [address]        Dear ,    We are writing to inform you of your test results.    Jhoan, your labs are looking great!  Keep up the good work!  No medication adjustments are needed at this time.  -DE    Resulted Orders   HIV Antigen Antibody Combo   Result Value Ref Range    HIV Antigen Antibody Combo Nonreactive Nonreactive      Comment:      HIV-1 p24 Ag & HIV-1/HIV-2 Ab Not Detected   TSH with free T4 reflex   Result Value Ref Range    TSH 1.78 0.30 - 4.20 uIU/mL   Uric acid   Result Value Ref Range    Uric Acid 6.0 3.4 - 7.0 mg/dL   Vitamin B12   Result Value Ref Range    Vitamin B12 387 232 - 1,245 pg/mL   Lipid Profile (Chol, Trig, HDL, LDL calc)   Result Value Ref Range    Cholesterol 152 <200 mg/dL    Triglycerides 61 <150 mg/dL    Direct Measure HDL 57 >=40 mg/dL    LDL Cholesterol Calculated 83 <=100 mg/dL    Non HDL Cholesterol 95 <130 mg/dL    Narrative    Cholesterol  Desirable:  <200 mg/dL    Triglycerides  Normal:  Less than 150 mg/dL  Borderline High:  150-199 mg/dL  High:  200-499 mg/dL  Very High:  Greater than or equal to 500 mg/dL    Direct Measure HDL  Female:  Greater than or equal to 50 mg/dL   Male:  Greater than or equal to 40 mg/dL    LDL Cholesterol  Desirable:  <100mg/dL  Above Desirable:  100-129 mg/dL   Borderline High:  130-159 mg/dL   High:  160-189 mg/dL   Very High:  >= 190 mg/dL    Non HDL Cholesterol  Desirable:  130 mg/dL  Above Desirable:  130-159 mg/dL  Borderline High:  160-189 mg/dL  High:  190-219 mg/dL  Very High:  Greater than or equal to 220 mg/dL   Basic metabolic panel  (Ca, Cl, CO2, Creat, Gluc, K, Na, BUN)   Result Value Ref Range    Sodium 141 136 - 145 mmol/L    Potassium 4.5 3.4 - 5.3 mmol/L    Chloride 103 98 - 107 mmol/L    Carbon Dioxide (CO2) 28 22 - 29 mmol/L    Anion Gap 10 7 - 15 mmol/L    Urea Nitrogen 15.2 8.0 - 23.0 mg/dL    Creatinine 0.92 0.67 - 1.17 mg/dL    Calcium 9.1 8.8 - 10.2  mg/dL    Glucose 89 70 - 99 mg/dL    GFR Estimate >90 >60 mL/min/1.73m2      Comment:      eGFR calculated using 2021 CKD-EPI equation.   CBC with platelets   Result Value Ref Range    WBC Count 4.8 4.0 - 11.0 10e3/uL    RBC Count 4.84 4.40 - 5.90 10e6/uL    Hemoglobin 16.0 13.3 - 17.7 g/dL    Hematocrit 48.2 40.0 - 53.0 %     78 - 100 fL    MCH 33.1 (H) 26.5 - 33.0 pg    MCHC 33.2 31.5 - 36.5 g/dL    RDW 14.0 10.0 - 15.0 %    Platelet Count 203 150 - 450 10e3/uL       If you have any questions or concerns, please call the clinic at the number listed above.       Sincerely,      Clifford Padilla, DO

## 2023-02-01 ENCOUNTER — OFFICE VISIT (OUTPATIENT)
Dept: FAMILY MEDICINE | Facility: CLINIC | Age: 63
End: 2023-02-01
Payer: MEDICARE

## 2023-02-01 VITALS
WEIGHT: 257 LBS | OXYGEN SATURATION: 95 % | SYSTOLIC BLOOD PRESSURE: 92 MMHG | HEART RATE: 62 BPM | RESPIRATION RATE: 18 BRPM | DIASTOLIC BLOOD PRESSURE: 59 MMHG | BODY MASS INDEX: 40.25 KG/M2 | TEMPERATURE: 98.1 F

## 2023-02-01 DIAGNOSIS — H61.23 BILATERAL IMPACTED CERUMEN: ICD-10-CM

## 2023-02-01 DIAGNOSIS — E53.8 VITAMIN B12 DEFICIENCY: ICD-10-CM

## 2023-02-01 DIAGNOSIS — M17.0 OSTEOARTHRITIS OF BOTH KNEES, UNSPECIFIED OSTEOARTHRITIS TYPE: Primary | ICD-10-CM

## 2023-02-01 PROCEDURE — 20610 DRAIN/INJ JOINT/BURSA W/O US: CPT | Mod: LT | Performed by: FAMILY MEDICINE

## 2023-02-01 PROCEDURE — 69210 REMOVE IMPACTED EAR WAX UNI: CPT | Performed by: FAMILY MEDICINE

## 2023-02-01 PROCEDURE — 96372 THER/PROPH/DIAG INJ SC/IM: CPT | Performed by: FAMILY MEDICINE

## 2023-02-01 RX ORDER — TRIAMCINOLONE ACETONIDE 40 MG/ML
40 INJECTION, SUSPENSION INTRA-ARTICULAR; INTRAMUSCULAR ONCE
Status: COMPLETED | OUTPATIENT
Start: 2023-02-01 | End: 2023-02-01

## 2023-02-01 RX ORDER — METHYLPREDNISOLONE ACETATE 40 MG/ML
40 INJECTION, SUSPENSION INTRA-ARTICULAR; INTRALESIONAL; INTRAMUSCULAR; SOFT TISSUE ONCE
Status: DISCONTINUED | OUTPATIENT
Start: 2023-02-01 | End: 2023-02-01

## 2023-02-01 RX ADMIN — CYANOCOBALAMIN 1000 MCG: 1000 INJECTION, SOLUTION INTRAMUSCULAR; SUBCUTANEOUS at 10:52

## 2023-02-01 RX ADMIN — TRIAMCINOLONE ACETONIDE 40 MG: 40 INJECTION, SUSPENSION INTRA-ARTICULAR; INTRAMUSCULAR at 12:50

## 2023-02-01 RX ADMIN — CYANOCOBALAMIN 1000 MCG: 1000 INJECTION, SOLUTION INTRAMUSCULAR; SUBCUTANEOUS at 10:50

## 2023-02-01 RX ADMIN — TRIAMCINOLONE ACETONIDE 40 MG: 40 INJECTION, SUSPENSION INTRA-ARTICULAR; INTRAMUSCULAR at 12:51

## 2023-02-01 NOTE — PROGRESS NOTES
Assessment & Plan     Osteoarthritis of both knees, unspecified osteoarthritis type  We discussed treatment options and decided to proceed with corticosteroid injections which have been helpful for him in the past.  The last treatment was on 6/29/2022.  Potential risks of the procedure were discussed and explained. I cleaned the area with iodine and alcohol swabs.  I injected 5 mL of lidocaine with 40 mg of Kenalog into each knee joint using an anterior-lateral approach.  He tolerated the procedure well.  - DRAIN/INJECT LARGE JOINT/BURSA  - DRAIN/INJECT LARGE JOINT/BURSA  - triamcinolone (KENALOG-40) injection 40 mg  - triamcinolone (KENALOG-40) injection 40 mg    Bilateral impacted cerumen  I personally removed the impacted cerumen from both canals using the lighted curette in the office today.  He tolerated this procedure well.  He comes in regularly to have the cerumen removed.  - OK REMOVAL IMPACTED CERUMEN IRRIGATION/LVG UNILAT    Vitamin B12 deficiency  He was given his next vitamin B12 injection.  His recent vitamin B12 level was in the low end of the normal range at 387 (1/26/2023)                   Return in about 4 weeks (around 3/1/2023) for Follow up.    Clifford Padilla, [facility]   Jhoan is a 62 year old, presenting for the following health issues:  Musculoskeletal Problem      History of Present Illness       Reason for visit:  Knee pain etc    He eats 2-3 servings of fruits and vegetables daily.He consumes 0 sweetened beverage(s) daily.He exercises with enough effort to increase his heart rate 10 to 19 minutes per day.  He exercises with enough effort to increase his heart rate 3 or less days per week.   He is taking medications regularly.             He has a history of bilateral cerumen impaction, hearing loss, trisomy 21, vitamin B12 deficiency, gout, hypothyroidism, obesity and bilateral osteoarthritis of the knees.    He was cussed in for physical  exam and had several labs checked which all looked good.  He comes in approximately once a month to have his ears cleaned out which seems to help with the frequent wax buildup issues he has.    He has also been complaining of worsening pain in his knees.  This makes it difficult for him to ambulate.  Occasionally he takes Tylenol for this, but it does not seem to help much recently.  X-rays in the past were consistent with osteoarthritis.  He has done well with corticosteroid injections in the past and he is interested in having this treatment again if possible.  His last injections were in June 2022.    Review of Systems   Constitutional, HEENT, cardiovascular, pulmonary, GI, , musculoskeletal, neuro, skin, endocrine and psych systems are negative, except as otherwise noted.      Objective    BP 92/59 (BP Location: Left arm, Patient Position: Sitting, Cuff Size: Adult Large)   Pulse 62   Temp 98.1  F (36.7  C) (Temporal)   Resp 18   Wt 116.6 kg (257 lb)   SpO2 95%   BMI 40.25 kg/m    Body mass index is 40.25 kg/m .  Physical Exam     GENERAL: healthy, alert and no distress  EYES: Eyes grossly normal to inspection, PERRL and conjunctivae and sclerae normal  HENT: Both canals are impacted with cerumen.  Once this was removed the canals and tympanic membranes appeared clear.  NECK: no adenopathy, no asymmetry, masses, or scars and thyroid normal to palpation  RESP: lungs clear to auscultation - no rales, rhonchi or wheezes  CV: regular rate and rhythm, normal S1 S2, no S3 or S4, no murmur, click or rub, no peripheral edema and peripheral pulses strong  MS: There is crepitation and pain with flexion and extension of both knees.  Gait is mildly antalgic.  SKIN: no suspicious lesions or rashes   NEURO: Normal strength and tone, mentation intact and speech normal  PSYCH: mentation appears normal, affect normal/bright

## 2023-02-15 ENCOUNTER — TELEPHONE (OUTPATIENT)
Dept: FAMILY MEDICINE | Facility: CLINIC | Age: 63
End: 2023-02-15
Payer: MEDICARE

## 2023-02-15 NOTE — TELEPHONE ENCOUNTER
Forms/Letter Request    Type of form/letter: fax received from [facility] [place].  Requesting a list of Patient's current medications, to be signed by the Dr. It Is needed before the patient can attend a new day program.   The fax with printed med last placed in provider's office.     Have you been seen for this request: N/A    Do we have the form/letter: Yes: faxed    When is form/letter needed by: asap    How would you like the form/letter returned: Fax    Patient Notified form requests are processed in 3-5 business days:No    Okay to leave a detailed message?: No at Other phone number:  Adam Carrasco, [phone] office or cell  [phone]

## 2023-02-16 ENCOUNTER — TELEPHONE (OUTPATIENT)
Dept: FAMILY MEDICINE | Facility: CLINIC | Age: 63
End: 2023-02-16
Payer: MEDICARE

## 2023-02-16 DIAGNOSIS — E66.01 MORBID OBESITY (H): Primary | ICD-10-CM

## 2023-02-16 NOTE — TELEPHONE ENCOUNTER
Forms faxed to [employer] fax # [phone] and copy sent to stat scan.     Alia ONTIVEROS

## 2023-02-16 NOTE — TELEPHONE ENCOUNTER
Sarah pelletier stating needs new order for JOVANY stockings as [facility] told her it will  next month.    Pended reorder for JOVANY stockings.    Roberto Villaseñor RN   [facility]

## 2023-03-13 ENCOUNTER — OFFICE VISIT (OUTPATIENT)
Dept: FAMILY MEDICINE | Facility: CLINIC | Age: 63
End: 2023-03-13
Payer: MEDICARE

## 2023-03-13 VITALS
OXYGEN SATURATION: 100 % | SYSTOLIC BLOOD PRESSURE: 97 MMHG | BODY MASS INDEX: 40.1 KG/M2 | TEMPERATURE: 98.4 F | HEART RATE: 59 BPM | WEIGHT: 256 LBS | DIASTOLIC BLOOD PRESSURE: 57 MMHG | RESPIRATION RATE: 16 BRPM

## 2023-03-13 DIAGNOSIS — Q90.9 TRISOMY 21, DOWN SYNDROME: ICD-10-CM

## 2023-03-13 DIAGNOSIS — E53.8 VITAMIN B12 DEFICIENCY: ICD-10-CM

## 2023-03-13 DIAGNOSIS — H61.23 BILATERAL IMPACTED CERUMEN: Primary | ICD-10-CM

## 2023-03-13 PROCEDURE — 99213 OFFICE O/P EST LOW 20 MIN: CPT | Mod: 25 | Performed by: FAMILY MEDICINE

## 2023-03-13 PROCEDURE — 96372 THER/PROPH/DIAG INJ SC/IM: CPT | Performed by: FAMILY MEDICINE

## 2023-03-13 PROCEDURE — 69210 REMOVE IMPACTED EAR WAX UNI: CPT | Performed by: FAMILY MEDICINE

## 2023-03-13 RX ADMIN — CYANOCOBALAMIN 1000 MCG: 1000 INJECTION, SOLUTION INTRAMUSCULAR; SUBCUTANEOUS at 15:44

## 2023-03-13 NOTE — PROGRESS NOTES
Bilateral impacted cerumen  I personally removed a large amount of cerumen from both ear canals using the lighted curette which he tolerated well.  He will continue to follow-up with his audiologist regarding hearing loss and his hearing aid.  - ID REMOVAL IMPACTED CERUMEN IRRIGATION/LVG UNILAT     Trisomy 21, Down syndrome  This issue is stable.  He lives in a group home.      Vitamin B12 deficiency (non anemic)  He has a history of vitamin B12 deficiency.  We have been spacing out the B12 injections to every 3 months since his levels have been so high.    Melina Staples is a 62 year old, presenting for the following health issues:  Ear Problem and Consult (b12)      HPI     Concern - Ear ax and B12        He has a history of Down syndrome, vitamin B12 deficiency, hearing loss and cerumen impaction.  He comes in approximately once a month to have his ears cleaned due to the significant wax buildup that seems to quickly accumulate.  The group home staff member is present and reports that his hearing seems worse over the past couple of weeks    Review of Systems   Constitutional, HEENT, cardiovascular, pulmonary, GI, , musculoskeletal, neuro, skin, endocrine and psych systems are negative, except as otherwise noted.      Objective    BP 97/57   Pulse 59   Temp 98.4  F (36.9  C) (Temporal)   Resp 16   Wt 116.1 kg (256 lb)   SpO2 100%   BMI 40.10 kg/m    Body mass index is 40.1 kg/m .  Physical Exam     GENERAL: healthy, alert and no distress  EYES: PERRL and conjunctivae and sclerae normal  HENT: Both canals impacted with cerumen.  Once this was removed the canals and tympanic membranes appeared clear.  Nose and mouth without ulcers or lesions  NECK: no adenopathy, no asymmetry, masses, or scars and thyroid normal to palpation  RESP: lungs clear to auscultation - no rales, rhonchi or wheezes  CV: regular rate and rhythm, normal S1 S2, no S3 or S4, no murmur, click or rub, no peripheral edema and peripheral  pulses strong  SKIN: no suspicious lesions or rashes

## 2023-03-13 NOTE — NURSING NOTE
Clinic Administered Medication Documentation    Administrations This Visit     cyanocobalamin injection 1,000 mcg     Admin Date  03/13/2023 Action  $Given Dose  1,000 mcg Route  Intramuscular Site   Administered By  Amber Luz RN    Ordering Provider: Christian Albarran MD    Patient Supplied?: No                  Injectable Medication Documentation    Patient was given Cyanocobalamin (B-12). Prior to medication administration, verified patients identity using patient s name and date of birth. Please see MAR and medication order for additional information. Patient instructed to remain in clinic for 15 minutes and report any adverse reaction to staff immediately .      Was entire vial of medication used? Yes  Vial/Syringe: Single dose vial  Expiration Date:  04/2024  Was this medication supplied by the patient? No    Amber Luz RN

## 2023-04-06 ENCOUNTER — TELEPHONE (OUTPATIENT)
Dept: FAMILY MEDICINE | Facility: CLINIC | Age: 63
End: 2023-04-06
Payer: MEDICARE

## 2023-04-06 NOTE — TELEPHONE ENCOUNTER
Forms/Letter Request    Type of form/letter: icd 10 form    Have you been seen for this request: Yes     Do we have the form/letter: Yes:  form    Who is the form from? [facility]    (if other please explain)    Where did/will the form come from? form was faxed in    When is form/letter needed by:  asap    How would you like the form/letter returned: Fax : [phone]    Patient Notified form requests are processed in 3-5 business days:Yes    Okay to leave a detailed message?: Yes at Other phone number:  Raphael ., [phone]

## 2023-04-10 NOTE — TELEPHONE ENCOUNTER
Forms faxed to [employer] fax # [phone] and copy sent to stat scan.     Alia ONTIVEROS

## 2023-04-19 RX ORDER — CYANOCOBALAMIN 1000 UG/ML
1 INJECTION, SOLUTION INTRAMUSCULAR; SUBCUTANEOUS
Qty: 1 ML | Refills: 0 | OUTPATIENT
Start: 2023-04-19

## 2023-05-07 ENCOUNTER — HOSPITAL ENCOUNTER (EMERGENCY)
Facility: HOSPITAL | Age: 63
Discharge: HOME | End: 2023-05-07
Attending: EMERGENCY MEDICINE | Admitting: EMERGENCY MEDICINE
Payer: MEDICARE

## 2023-05-07 VITALS
HEART RATE: 69 BPM | SYSTOLIC BLOOD PRESSURE: 106 MMHG | OXYGEN SATURATION: 98 % | TEMPERATURE: 98 F | DIASTOLIC BLOOD PRESSURE: 67 MMHG | RESPIRATION RATE: 16 BRPM

## 2023-05-07 DIAGNOSIS — Z02.83 ENCOUNTER FOR DRUG SCREENING: ICD-10-CM

## 2023-05-07 LAB
AMPHETAMINES UR QL SCN: NORMAL
BARBITURATES UR QL SCN: NORMAL
BENZODIAZ UR QL SCN: NORMAL
BZE UR QL SCN: NORMAL
CANNABINOIDS UR QL SCN: NORMAL
OPIATES UR QL SCN: NORMAL
PCP QUAL URINE (ROCHE): NORMAL

## 2023-05-07 PROCEDURE — 80307 DRUG TEST PRSMV CHEM ANLYZR: CPT | Performed by: EMERGENCY MEDICINE

## 2023-05-07 PROCEDURE — 99283 EMERGENCY DEPT VISIT LOW MDM: CPT

## 2023-05-07 ASSESSMENT — ACTIVITIES OF DAILY LIVING (ADL): ADLS_ACUITY_SCORE: 35

## 2023-05-07 NOTE — ED TRIAGE NOTES
Here with staff from [facility]. Left home on his own and was unaccounted for for 12 hours. Staff would like him to have a drug screen and a physical. Was seen at urgent care and was sent here.      Triage Assessment     Row Name 05/07/23 1540       Triage Assessment (Adult)    Airway WDL WDL

## 2023-05-07 NOTE — ED PROVIDER NOTES
EMERGENCY DEPARTMENT ENCOUNTER            IMPRESSION:  Evaluation for substance - urine drug screen negative        MEDICAL DECISION MAKING:  It was my pleasure to provide care for Christian Bobby who was brought in by his caretaker for drug screen.  Patient has history of mental disability.  He is a resident of a group home.  Last night he eloped for several hours and the group home staff requesting assessment.  He was seen in clinic earlier today and had negative work-up including blood sugar testing he was sent here for drug screen    On my exam patient is pleasant and cooperative.  No evidence of distress.  Vital signs are normal.  Physical exam notable for no evidence of trauma.  He is at his baseline mental status.     Significant laboratory findings include negative drug screen.       Patient is apparently at his baseline.  No evidence of toxic encephalopathy.  He was discharged with his care manager    =================================================================  CHIEF COMPLAINT:  Chief Complaint   Patient presents with     Drug Screen         HPI  Christian Bobby is a 62 year old male with a history of trisomy 21 down syndrome, dementia, morbid obesity, hypothyroidism, prediabetes, cataract, corneal disorder, and GERD who presents to the ED by private car with  for evaluation of drug screen.    Staff member reports that patient left his group home yesterday at around 1445. Staff notes that for 12 hours he was unaccounted for. At 0300 patient was brought back to group home by the police. Staff is now here in order to make sure patient was not involved with any drugs while unsupervised. Staff does mention they were at urgent care earlier today where his blood glucose and other labs were tested which came back normal. They did refer him here for further testing. Staff denies any injuries, just drowsy/sleepy and tired. Staff reports patient having a large soda and two sandwiches before coming here  today.    Otherwise in normal state of health. No further concerns at this time.        REVIEW OF SYSTEMS   Constitutional: Does not report chills, unintentional weight loss. Increased fatigue. sleepiness.   Eyes: Does not report visual changes or discharge    HENT: Does not report sore throat, ear pain or neck pain  Respiratory: Does not report cough or shortness of breath    Cardiovascular: Does not report chest pain, palpitations or leg swelling  GI: Does not report abdominal pain, nausea, vomiting, or dark, bloody stools.    : Does not report hematuria, dysuria, or flank pain  Musculoskeletal: Does not report any new musculoskeletal pain or new muscle/joint pains  Skin: Does not report rash or wound  Neurologic: Does not report current headache, new weakness, focal weakness, or sensory changes        Remainder of systems reviewed, unless noted in HPI all others negative.      PAST MEDICAL HISTORY:  History reviewed. No pertinent past medical history.    PAST SURGICAL HISTORY:  Past Surgical History:   Procedure Laterality Date     CA EXCIS THYROID DUCT CYST/SINUS      Description: Thyroglossal Duct Cyst Excision;  Recorded: 05/01/2012;         CURRENT MEDICATIONS:    allopurinol (ZYLOPRIM) 100 MG tablet  erythromycin (ROMYCIN) 5 MG/GM ophthalmic ointment  famotidine (PEPCID) 20 MG tablet  hypromellose (GENTEAL SEVERE) ophthalmic gel 0.3%  latanoprost (XALATAN) 0.005 % ophthalmic solution  levothyroxine (SYNTHROID/LEVOTHROID) 75 MCG tablet  polyvinyl alcohol (LIQUIFILM TEARS) 1.4 % ophthalmic solution  triamcinolone (KENALOG) 0.1 % external cream        ALLERGIES:  Allergies   Allergen Reactions     Gluten Meal      Diarrhea       FAMILY HISTORY:  No family history on file.    SOCIAL HISTORY:   Social History     Socioeconomic History     Marital status: Single   Tobacco Use     Smoking status: Never     Smokeless tobacco: Never   Vaping Use     Vaping status: Never Used   Substance and Sexual Activity      Alcohol use: Never     Drug use: Never     Sexual activity: Never       PHYSICAL EXAM:    /67   Pulse 69   Temp 98  F (36.7  C) (Oral)   Resp 16   SpO2 98%     Constitutional: He is awake alert and responsive  Head: Down syndrome morphology  ENT: Mucous membranes are moist.  No pallor.   Eyes: Pupils are reactive.  No discoloration.  Neck: No lymphadenopathy, no stridor, supple, no soft tissue swelling  Chest: No tenderness   Respiratory: Respirations even, unlabored. Lungs clear to ascultation bilaterally, in no acute respiratory distress.  Cardiovascular: Regular rate and rhythm.  Good overall perfusion.  Upper and lower extremity pulses are equal.  GI: Abdomen soft, non-tender to palpation.  No guarding or rebound. Bowel sounds present throughout.   Back: No CVA tenderness.    Musculoskeletal: Moves all 4 extremities equally, full function and capacity no peripheral edema.   Integument: Warm, dry. No rash. No bruising or petechiae.  Neurologic: Alert & oriented   Psychiatric: Normal mood and affect.  Appropriate judgement.    ED COURSE:  3:54 PM I met with the patient and performed my initial physical exam. I spoke with the patient about the plan going forward including drug screen.   5:15 PM I rechecked on patient and spoke with him and his staff about the lab results. I discussed discharge with them.      Medical Decision Making    History:    Supplemental history from: Care center staff.    External Record(s) reviewed: External medical records including care everywhere reviewed    Work Up:    EKG, laboratory and imaging studies as ordered were independently reviewed by the provid    The patient's presentation was of low complexity.       Complicating factors:    Patient has a complicated past medical history including trisomy 21 down syndrome, dementia, morbid obesity, hypothyroidism, prediabetes, cataract, corneal disorder, and GERD.    Care affected by social determinants of health: Access to  primary care -- Weekend.        LAB:  Laboratory results were independently reviewed and interpreted  Results for orders placed or performed during the hospital encounter of 05/07/23   Drug abuse screen 77 urine (FL, RH, SH)   Result Value Ref Range    Amphetamines Urine Screen Negative Screen Negative    Barbituates Urine Screen Negative Screen Negative    Benzodiazepine Urine Screen Negative Screen Negative    Cannabinoids Urine Screen Negative Screen Negative    Opiates Urine Screen Negative Screen Negative    PCP Urine Screen Negative Screen Negative    Cocaine Urine Screen Negative Screen Negative         NEW PRESCRIPTIONS STARTED AT TODAY'S ER VISIT:  New Prescriptions    No medications on file                FINAL DIAGNOSIS:    ICD-10-CM    1. Encounter for drug screening  Z02.83                  NAME: Christian Bobby  AGE: 62 year old male  YOB: 1960  MRN: [id number]  EVALUATION DATE & TIME: No admission date for patient encounter.    PCP: Clifford Wooten    ED PROVIDER: Sher Strange M.D.      Faby LAMBERT, am serving as a scribe to document services personally performed by Dr. Sher Strange based on my observation and the provider's statements to me. I, Sher Strange MD attest that Faby Willett is acting in a scribe capacity, has observed my performance of the services and has documented them in accordance with my direction.    Sher Strange M.D.  Emergency Medicine  [facility] EMERGENCY DEPARTMENT  [facility] [facility] [address]  [phone]  Dept: [phone]  5/7/2023         Sher Strange MD  05/07/23 2118

## 2023-05-17 ENCOUNTER — ALLIED HEALTH/NURSE VISIT (OUTPATIENT)
Dept: FAMILY MEDICINE | Facility: CLINIC | Age: 63
End: 2023-05-17
Payer: MEDICARE

## 2023-05-17 DIAGNOSIS — E53.8 VITAMIN B12 DEFICIENCY (NON ANEMIC): Primary | ICD-10-CM

## 2023-05-17 PROCEDURE — 96372 THER/PROPH/DIAG INJ SC/IM: CPT | Performed by: FAMILY MEDICINE

## 2023-05-17 PROCEDURE — 99207 PR NO CHARGE NURSE ONLY: CPT

## 2023-05-17 RX ADMIN — CYANOCOBALAMIN 1000 MCG: 1000 INJECTION, SOLUTION INTRAMUSCULAR; SUBCUTANEOUS at 08:34

## 2023-05-17 NOTE — PROGRESS NOTES
Clinic Administered Medication Documentation      Injectable Medication Documentation    Is there an active order (written within the past 365 days, with administrations remaining, not ) in the chart? Yes.     Patient was given Cyanocobalamin (B-12). Prior to medication administration, verified patient's identity using patient s name and date of birth. Please see MAR and medication order for additional information. Patient instructed to remain in clinic for 15 minutes, report any adverse reaction to staff immediately and B12 injection to be given monthly per orders .    Vial/Syringe: Single dose vial. Was entire vial of medication used? Yes  Was this medication supplied by the patient? No  Is this a medication the patient will need to receive again? Yes. Verified that the patient has refills remaining in their prescription.

## 2023-05-30 ENCOUNTER — TELEPHONE (OUTPATIENT)
Dept: FAMILY MEDICINE | Facility: CLINIC | Age: 63
End: 2023-05-30
Payer: MEDICARE

## 2023-05-30 NOTE — TELEPHONE ENCOUNTER
Forms/Letter Request    Type of form/letter:  Order from [facility] for a cane, folding black 250lb cap     Have you been seen for this request: N/A    Do we have the form/letter: Yes:     Who is the form from? [facility] (if other please explain)    Where did/will the form come from? form was faxed in    When is form/letter needed by: asap    How would you like the form/letter returned: Fax : [phone]    Patient Notified form requests are processed in 3-5 business days:No    Okay to leave a detailed message?: No at Other phone number:  [facility] # [phone]

## 2023-05-31 ENCOUNTER — MEDICAL CORRESPONDENCE (OUTPATIENT)
Dept: HEALTH INFORMATION MANAGEMENT | Facility: CLINIC | Age: 63
End: 2023-05-31
Payer: MEDICARE

## 2023-05-31 ENCOUNTER — TELEPHONE (OUTPATIENT)
Dept: FAMILY MEDICINE | Facility: CLINIC | Age: 63
End: 2023-05-31
Payer: MEDICARE

## 2023-05-31 NOTE — TELEPHONE ENCOUNTER
Forms/Letter Request    Type of form/letter: rx order    Have you been seen for this request: Yes    Do we have the form/letter: Yes:  order    Who is the form from?  (if other please explain)    Where did/will the form come from? form was faxed in    When is form/letter needed by:  asap    How would you like the form/letter returned: Fax : [phone]    Patient Notified form requests are processed in 3-5 business days:Yes    Okay to leave a detailed message?: Yes at Other phone number:  [phone]

## 2023-06-02 NOTE — TELEPHONE ENCOUNTER
Returned, missing ICD Code gave to Dr Mak to Add ICD Code  Then Refax back  [facility] Unit Coordinator

## 2023-06-08 ENCOUNTER — TRANSFERRED RECORDS (OUTPATIENT)
Dept: HEALTH INFORMATION MANAGEMENT | Facility: CLINIC | Age: 63
End: 2023-06-08
Payer: MEDICARE

## 2023-08-11 ENCOUNTER — TELEPHONE (OUTPATIENT)
Dept: FAMILY MEDICINE | Facility: CLINIC | Age: 63
End: 2023-08-11

## 2023-08-11 NOTE — TELEPHONE ENCOUNTER
Clifford SANTIAGO from [facility] called.    Patient has a waiver with [facility].  Needs a psych evaluation to renew the waiver.    Clifford asking if you would do this or should he she psych?    Clifford aware you are out of the office until Monday 8/14  ([phone])    Thank you,  Glendy Benton RN

## 2023-08-14 NOTE — TELEPHONE ENCOUNTER
Spoke w/ clifford.   Did fax over ICD-10 diagnosis form DE signed back in April to [phone].   Clifford will let us know if he needs anything else from our office.     Alai ONTIVEROS

## 2023-08-14 NOTE — TELEPHONE ENCOUNTER
I should be able to do this.  I filled out an ICD 10 diagnosis verification form for [facility] that is scanned into the media section of his chart from 4/11/2023.  If this is the form they need filled out you can pass it along to them.  Thank you, DE

## 2023-08-14 NOTE — TELEPHONE ENCOUNTER
TC's,      Please see messages below.    Can someone please call Clifford from Home [facility]?      Thank you,  Glendy Benton RN

## 2023-11-24 ENCOUNTER — TELEPHONE (OUTPATIENT)
Dept: FAMILY MEDICINE | Facility: CLINIC | Age: 63
End: 2023-11-24
Payer: MEDICARE

## 2023-11-24 NOTE — TELEPHONE ENCOUNTER
Forms/Letter Request    Type of form/letter:   ICD-10 Diagnosis verification form    Have you been seen for this request: N/A    Do we have the form/letter:   Placed in Dr. Aubree Padilal's office bin    Who is the form from?   [facility]    Where did/will the form come from? form was faxed in    When is form/letter needed by: n/a    How would you like the form/letter returned:   Fax: [phone]    Patient Notified form requests are processed in 3-5 business days:No    Okay to leave a detailed message?:  n/a

## 2023-11-24 NOTE — TELEPHONE ENCOUNTER
Forms/Letter Request    Type of form/letter:   Medical Records/physician orders  11/24/2023  Medication list review    Have you been seen for this request: N/A    Do we have the form/letter:   Placed in Dr. Aubree Padilla office bin    Who is the form from?   [facility]    Where did/will the form come from? form was faxed in    When is form/letter needed by: n/a    How would you like the form/letter returned:   Fax: [phone]    Patient Notified form requests are processed in 3-5 business days:No    Okay to leave a detailed message?:n/a

## 2023-11-27 ENCOUNTER — MEDICAL CORRESPONDENCE (OUTPATIENT)
Dept: HEALTH INFORMATION MANAGEMENT | Facility: CLINIC | Age: 63
End: 2023-11-27
Payer: MEDICARE

## 2023-11-27 NOTE — TELEPHONE ENCOUNTER
Forms faxed to [employer] [facility] fax # [phone] and copy sent to stat scan.     Alia ONTIVEROS

## 2024-01-04 ENCOUNTER — PATIENT OUTREACH (OUTPATIENT)
Dept: CARE COORDINATION | Facility: CLINIC | Age: 64
End: 2024-01-04
Payer: MEDICARE

## 2024-01-18 ENCOUNTER — PATIENT OUTREACH (OUTPATIENT)
Dept: CARE COORDINATION | Facility: CLINIC | Age: 64
End: 2024-01-18
Payer: MEDICARE

## 2024-02-22 ENCOUNTER — APPOINTMENT (OUTPATIENT)
Dept: CT IMAGING | Facility: HOSPITAL | Age: 64
End: 2024-02-22
Attending: PHYSICIAN ASSISTANT
Payer: MEDICARE

## 2024-02-22 ENCOUNTER — HOSPITAL ENCOUNTER (EMERGENCY)
Facility: HOSPITAL | Age: 64
Discharge: HOME | End: 2024-02-22
Admitting: PHYSICIAN ASSISTANT
Payer: MEDICARE

## 2024-02-22 VITALS
OXYGEN SATURATION: 98 % | WEIGHT: 256 LBS | SYSTOLIC BLOOD PRESSURE: 121 MMHG | DIASTOLIC BLOOD PRESSURE: 67 MMHG | BODY MASS INDEX: 40.18 KG/M2 | HEIGHT: 67 IN | HEART RATE: 72 BPM | RESPIRATION RATE: 18 BRPM | TEMPERATURE: 98.9 F

## 2024-02-22 DIAGNOSIS — N32.89 BLADDER WALL THICKENING: ICD-10-CM

## 2024-02-22 DIAGNOSIS — R31.0 GROSS HEMATURIA: ICD-10-CM

## 2024-02-22 LAB
ABO/RH(D): NORMAL
ALBUMIN SERPL BCG-MCNC: 3.8 G/DL (ref 3.5–5.2)
ALBUMIN UR-MCNC: NEGATIVE MG/DL
ALP SERPL-CCNC: 133 U/L (ref 40–150)
ALT SERPL W P-5'-P-CCNC: 16 U/L (ref 0–70)
ANION GAP SERPL CALCULATED.3IONS-SCNC: 9 MMOL/L (ref 7–15)
ANTIBODY SCREEN: NEGATIVE
APPEARANCE UR: CLEAR
AST SERPL W P-5'-P-CCNC: 27 U/L (ref 0–45)
BASOPHILS # BLD AUTO: 0.1 10E3/UL (ref 0–0.2)
BASOPHILS NFR BLD AUTO: 1 %
BILIRUB SERPL-MCNC: 0.7 MG/DL
BILIRUB UR QL STRIP: NEGATIVE
BUN SERPL-MCNC: 10.8 MG/DL (ref 8–23)
CALCIUM SERPL-MCNC: 8.9 MG/DL (ref 8.8–10.2)
CHLORIDE SERPL-SCNC: 102 MMOL/L (ref 98–107)
COLOR UR AUTO: COLORLESS
CREAT SERPL-MCNC: 1.04 MG/DL (ref 0.67–1.17)
CRP SERPL-MCNC: 6.8 MG/L
DEPRECATED HCO3 PLAS-SCNC: 29 MMOL/L (ref 22–29)
EGFRCR SERPLBLD CKD-EPI 2021: 82 ML/MIN/1.73M2
EOSINOPHIL # BLD AUTO: 0.2 10E3/UL (ref 0–0.7)
EOSINOPHIL NFR BLD AUTO: 3 %
ERYTHROCYTE [DISTWIDTH] IN BLOOD BY AUTOMATED COUNT: 13.6 % (ref 10–15)
GLUCOSE SERPL-MCNC: 81 MG/DL (ref 70–99)
GLUCOSE UR STRIP-MCNC: NEGATIVE MG/DL
HCT VFR BLD AUTO: 49.8 % (ref 40–53)
HGB BLD-MCNC: 17.2 G/DL (ref 13.3–17.7)
HGB UR QL STRIP: ABNORMAL
IMM GRANULOCYTES # BLD: 0 10E3/UL
IMM GRANULOCYTES NFR BLD: 1 %
INR PPP: 1.02 (ref 0.85–1.15)
KETONES UR STRIP-MCNC: NEGATIVE MG/DL
LEUKOCYTE ESTERASE UR QL STRIP: NEGATIVE
LYMPHOCYTES # BLD AUTO: 2 10E3/UL (ref 0.8–5.3)
LYMPHOCYTES NFR BLD AUTO: 33 %
MCH RBC QN AUTO: 33.7 PG (ref 26.5–33)
MCHC RBC AUTO-ENTMCNC: 34.5 G/DL (ref 31.5–36.5)
MCV RBC AUTO: 98 FL (ref 78–100)
MONOCYTES # BLD AUTO: 0.7 10E3/UL (ref 0–1.3)
MONOCYTES NFR BLD AUTO: 12 %
NEUTROPHILS # BLD AUTO: 3.1 10E3/UL (ref 1.6–8.3)
NEUTROPHILS NFR BLD AUTO: 50 %
NITRATE UR QL: NEGATIVE
NRBC # BLD AUTO: 0 10E3/UL
NRBC BLD AUTO-RTO: 0 /100
PH UR STRIP: 6.5 [PH] (ref 5–7)
PLATELET # BLD AUTO: 171 10E3/UL (ref 150–450)
POTASSIUM SERPL-SCNC: 4.3 MMOL/L (ref 3.4–5.3)
PROT SERPL-MCNC: 7.6 G/DL (ref 6.4–8.3)
RBC # BLD AUTO: 5.11 10E6/UL (ref 4.4–5.9)
RBC URINE: 5 /HPF
SODIUM SERPL-SCNC: 140 MMOL/L (ref 135–145)
SP GR UR STRIP: 1 (ref 1–1.03)
SPECIMEN EXPIRATION DATE: NORMAL
UROBILINOGEN UR STRIP-MCNC: <2 MG/DL
WBC # BLD AUTO: 6.1 10E3/UL (ref 4–11)
WBC URINE: <1 /HPF

## 2024-02-22 PROCEDURE — 80053 COMPREHEN METABOLIC PANEL: CPT | Performed by: STUDENT IN AN ORGANIZED HEALTH CARE EDUCATION/TRAINING PROGRAM

## 2024-02-22 PROCEDURE — 85610 PROTHROMBIN TIME: CPT | Performed by: STUDENT IN AN ORGANIZED HEALTH CARE EDUCATION/TRAINING PROGRAM

## 2024-02-22 PROCEDURE — 86140 C-REACTIVE PROTEIN: CPT | Performed by: EMERGENCY MEDICINE

## 2024-02-22 PROCEDURE — G1010 CDSM STANSON: HCPCS

## 2024-02-22 PROCEDURE — 36415 COLL VENOUS BLD VENIPUNCTURE: CPT | Performed by: STUDENT IN AN ORGANIZED HEALTH CARE EDUCATION/TRAINING PROGRAM

## 2024-02-22 PROCEDURE — 81001 URINALYSIS AUTO W/SCOPE: CPT | Performed by: STUDENT IN AN ORGANIZED HEALTH CARE EDUCATION/TRAINING PROGRAM

## 2024-02-22 PROCEDURE — 250N000011 HC RX IP 250 OP 636: Performed by: PHYSICIAN ASSISTANT

## 2024-02-22 PROCEDURE — 99285 EMERGENCY DEPT VISIT HI MDM: CPT | Mod: 25

## 2024-02-22 PROCEDURE — 85025 COMPLETE CBC W/AUTO DIFF WBC: CPT | Performed by: STUDENT IN AN ORGANIZED HEALTH CARE EDUCATION/TRAINING PROGRAM

## 2024-02-22 PROCEDURE — 74177 CT ABD & PELVIS W/CONTRAST: CPT | Mod: MG

## 2024-02-22 PROCEDURE — 86900 BLOOD TYPING SEROLOGIC ABO: CPT | Performed by: STUDENT IN AN ORGANIZED HEALTH CARE EDUCATION/TRAINING PROGRAM

## 2024-02-22 RX ORDER — IOPAMIDOL 755 MG/ML
90 INJECTION, SOLUTION INTRAVASCULAR ONCE
Status: COMPLETED | OUTPATIENT
Start: 2024-02-22 | End: 2024-02-22

## 2024-02-22 RX ADMIN — IOPAMIDOL 90 ML: 755 INJECTION, SOLUTION INTRAVENOUS at 20:59

## 2024-02-22 ASSESSMENT — COLUMBIA-SUICIDE SEVERITY RATING SCALE - C-SSRS
1. IN THE PAST MONTH, HAVE YOU WISHED YOU WERE DEAD OR WISHED YOU COULD GO TO SLEEP AND NOT WAKE UP?: NO
6. HAVE YOU EVER DONE ANYTHING, STARTED TO DO ANYTHING, OR PREPARED TO DO ANYTHING TO END YOUR LIFE?: NO
2. HAVE YOU ACTUALLY HAD ANY THOUGHTS OF KILLING YOURSELF IN THE PAST MONTH?: NO

## 2024-02-23 NOTE — ED PROVIDER NOTES
Emergency Department Encounter   NAME: Christian Bobby ; AGE: 63 year old male ; YOB: 1960 ; MRN: [id number] ; PCP: Clifford Wooten   ED PROVIDER: Bee Solis PA-C    Evaluation Date & Time:   No admission date for patient encounter.    CHIEF COMPLAINT:  Dysuria (/) and Hematuria      Impression and Plan   MDM:   Christian Bobby is a 63 year old male with a pertinent history of dementia, Down syndrome, and GERD, who presents to the ED for evaluation of hematuria.  The patient presents to the emergency department with his group home staff member for evaluation of blood in his urine.  He reportedly had blood in his stool 2 months ago, had 1 episode of blood in his stool this week, and then had noted blood in his urine by his  staff today.  The current staff member that is with him in the emergency department has not noticed any blood in his stool or urine.  He is not anticoagulated.  He does have a legal guardian, however unfortunately was unable to reach her as she is currently on the cell phone service for her family member.  Did leave a voicemail.  The patient is generally well-appearing, very pleasant, and in no acute distress.  Afebrile vitally stable.  No pallor.  Given no recent rectal bleeding, my clinical suspicion for an emergent/life-threatening GI bleed is extremely low, though will assess blood counts today.  Primary complaint was the blood visualized in his urine earlier, however he provided a urine sample here in the ED with no visible blood.  He reports frequency with urination though no dysuria.  His abdomen is benign and no CVA tenderness.  We discussed plan for laboratory workup, urine evaluation, and CT of abdomen and pelvis to further evaluate.    Nursing staff performed a postvoid bladder scan and he only has a 36 cc residual.  No concern for acute urinary retention.  He denies any rectal pain, no fevers or evidence of infection in the urine to suggest acute  bacterial prostatitis.  UA does show some blood with 5 RBCs though there are no nitrates, leukocytes, WBCs or bacteria to suggest acute cystitis.  Did send a urine culture out of precaution.  INR within normal range.  Hemoglobin and hematocrit are within normal limits.  No JULIANNE or hepatic dysfunction.  CRP mildly elevated though nonspecific.  CT shows that the bladder wall appears thickened, though no evidence of cystitis on his UA.  Kidneys and ureters without acute abnormalities.  No secondarily evidence of diverticulitis, colitis or any findings to explain his recent episode of rectal bleeding.  At this time, no emergent source of his symptoms though hematuria with thickened bladder.  Certainly requires close follow-up with urology especially to rule out malignancy.  I expressed my concerns to his group home staff member and she understands need for close follow-up.  Referral to urology placed and phone number for [place] [facility] provided to staff member.  They will connect with patient's legal guardian when she returns within cell phone range.  As blood in his urine has cleared and he has not had any clots, no indication for CBI or concern for impending bladder outlet obstruction.  Plan for close follow-up with PCP and urology and we reviewed concerning signs and symptoms to return to the emergency department.  Staff member verbalized understanding he was discharged home in stable condition.    Medical Decision Making    History:  Supplemental history from: Yes - group home staff member   External Record(s) reviewed: phone call with PCP on 2/19/2024    Work Up:  Chart documentation includes differential considered and any EKGs or imaging independently interpreted by provider, where specified.  In additional to work up documented, I considered the following work up: Documented in chart, if applicable.    External consultation:  Discussion of management with another provider: Documented in chart, if  applicable    Complicating factors:  Care impacted by chronic illness: Dementia and Other: Trisomy 21  Care affected by social determinants of health: Access to Medical Care    Disposition considerations: Discharge. No recommendations on prescription strength medication(s). See documentation for any additional details.      ED COURSE:  7:56 PM I met and introduced myself to the patient. I gathered initial history and performed my physical exam. We discussed plan for initial workup.   10:00 PM Called legal guardian and left a message on her cell. Then called her home phone where her daughter picked up the phone and informed me that the legal guardian is in the [place] and is out of cell service.   10:06 PM I rechecked the patient and discussed results, discharge, follow up, and reasons to return to the ED.     At the conclusion of the encounter I discussed the results of all the tests and the disposition. The questions were answered. The patient or family acknowledged understanding and was agreeable with the care plan.    FINAL IMPRESSION:    ICD-10-CM    1. Gross hematuria  R31.0 Adult Urology  Referral      2. Bladder wall thickening  N32.89 Adult Urology  Referral            MEDICATIONS GIVEN IN THE EMERGENCY DEPARTMENT:  Medications   iopamidol (ISOVUE-370) solution 90 mL (90 mLs Intravenous $Given 2/22/24 2059)         NEW PRESCRIPTIONS STARTED AT TODAY'S ED VISIT:  Discharge Medication List as of 2/22/2024 10:25 PM            HPI   Patient information was obtained from: the patient and his group home staff member   Use of Intrepreter: N/A     Christian Bobby is a 63 year old male with a pertinent history of dementia, Down syndrome, and GERD, who presents to the ED for evaluation of hematuria.     The patient has a history of rectal bleeding in the past. His group home staffer reports that there may have been another episode a couple days ago. Today at the patient's day program, staff  "observed blood in the patient's urine. The group home staff member that is present in the ED today has not seen any hematuria. When asked, the patient stated that he was experiencing urinary frequency, but no dysuria. No blood thinner use. The patient has not had fever, chills, abdominal pain, back pain, nausea, vomiting, or any other symptoms recently. He does not take any blood thinning medications. This has never happened to him before.     REVIEW OF SYSTEMS:  Pertinent positive and negative symptoms per HPI.       Medical History     No past medical history on file.    Past Surgical History:   Procedure Laterality Date    WY EXCIS THYROID DUCT CYST/SINUS      Description: Thyroglossal Duct Cyst Excision;  Recorded: 05/01/2012;       No family history on file.    Social History     Tobacco Use    Smoking status: Never    Smokeless tobacco: Never   Vaping Use    Vaping Use: Never used   Substance Use Topics    Alcohol use: Never    Drug use: Never       allopurinol (ZYLOPRIM) 100 MG tablet  erythromycin (ROMYCIN) 5 MG/GM ophthalmic ointment  famotidine (PEPCID) 20 MG tablet  hypromellose (GENTEAL SEVERE) ophthalmic gel 0.3%  latanoprost (XALATAN) 0.005 % ophthalmic solution  levothyroxine (SYNTHROID/LEVOTHROID) 75 MCG tablet  polyvinyl alcohol (LIQUIFILM TEARS) 1.4 % ophthalmic solution  triamcinolone (KENALOG) 0.1 % external cream          Physical Exam     First Vitals:  Patient Vitals for the past 24 hrs:   BP Temp Temp src Pulse Resp SpO2 Height Weight   02/22/24 2227 121/67 -- -- 72 -- -- -- --   02/22/24 1813 127/84 98.9  F (37.2  C) Oral 71 18 98 % 1.702 m (5' 7\") 116.1 kg (256 lb)         PHYSICAL EXAM:   Physical Exam  Vitals and nursing note reviewed.   Constitutional:       General: He is not in acute distress.     Appearance: He is not toxic-appearing.      Comments: Repetitively asking to have his IV removed.    Eyes:      Conjunctiva/sclera: Conjunctivae normal.   Cardiovascular:      Rate and " Rhythm: Normal rate and regular rhythm.      Heart sounds: Normal heart sounds.   Pulmonary:      Effort: Pulmonary effort is normal.      Breath sounds: Normal breath sounds.   Abdominal:      General: Abdomen is flat. Bowel sounds are normal. There is no distension.      Palpations: Abdomen is soft.      Tenderness: There is no abdominal tenderness. There is no right CVA tenderness, left CVA tenderness, guarding or rebound.   Skin:     General: Skin is warm and dry.      Coloration: Skin is not pale.   Neurological:      Mental Status: He is alert.             Results     LAB:  All pertinent labs reviewed and interpreted  Labs Ordered and Resulted from Time of ED Arrival to Time of ED Departure   ROUTINE UA WITH MICROSCOPIC REFLEX TO CULTURE - Abnormal       Result Value    Color Urine Colorless      Appearance Urine Clear      Glucose Urine Negative      Bilirubin Urine Negative      Ketones Urine Negative      Specific Gravity Urine 1.003      Blood Urine 1.0 mg/dL (*)     pH Urine 6.5      Protein Albumin Urine Negative      Urobilinogen Urine <2.0      Nitrite Urine Negative      Leukocyte Esterase Urine Negative      RBC Urine 5 (*)     WBC Urine <1     CBC WITH PLATELETS AND DIFFERENTIAL - Abnormal    WBC Count 6.1      RBC Count 5.11      Hemoglobin 17.2      Hematocrit 49.8      MCV 98      MCH 33.7 (*)     MCHC 34.5      RDW 13.6      Platelet Count 171      % Neutrophils 50      % Lymphocytes 33      % Monocytes 12      % Eosinophils 3      % Basophils 1      % Immature Granulocytes 1      NRBCs per 100 WBC 0      Absolute Neutrophils 3.1      Absolute Lymphocytes 2.0      Absolute Monocytes 0.7      Absolute Eosinophils 0.2      Absolute Basophils 0.1      Absolute Immature Granulocytes 0.0      Absolute NRBCs 0.0     CRP INFLAMMATION - Abnormal    CRP Inflammation 6.80 (*)    INR - Normal    INR 1.02     COMPREHENSIVE METABOLIC PANEL - Normal    Sodium 140      Potassium 4.3      Carbon Dioxide (CO2)  29      Anion Gap 9      Urea Nitrogen 10.8      Creatinine 1.04      GFR Estimate 82      Calcium 8.9      Chloride 102      Glucose 81      Alkaline Phosphatase 133      AST 27      ALT 16      Protein Total 7.6      Albumin 3.8      Bilirubin Total 0.7     TYPE AND SCREEN, ADULT    ABO/RH(D) O POS      Antibody Screen Negative      SPECIMEN EXPIRATION DATE [id number]     URINE CULTURE   ABO/RH TYPE AND SCREEN       RADIOLOGY:  CT Abdomen Pelvis w Contrast   Final Result   IMPRESSION:    1.  Bladder wall is thickened which may be related to a cystitis. Kidneys and ureters appear to be within normal limits.              I, Karina Daley, am serving as a scribe to document services personally performed by Bee Solis PA-C, based on my observation and the provider's statements to me. I, Bee Solis PA-C attest that Karina Daley is acting in a scribe capacity, has observed my performance of the services and has documented them in accordance with my direction.       Bee Solis PA-C   Emergency Medicine   [facility] EMERGENCY DEPARTMENT       Bee Solis PA-C  02/22/24 8444

## 2024-02-23 NOTE — ED TRIAGE NOTES
Patient arrives for evaluation of blood in urine, states that at day program today staff noticed that patient had blood in his urine and seemed to complain of painful urination. Patient also was referred to ED by PCP after complaining of having an occurrence of bright red blood in stool 2 months ago. Patient's staff at group home did notice that patient had another occurrence of bloody stool this past week and states that his PCP would like a CT scan done.

## 2024-02-23 NOTE — DISCHARGE INSTRUCTIONS
As we discussed, his bladder appears thickened on the CT scan and given the blood, he will need follow-up with a urologist.  We need to make sure he does not have bladder cancer or something more concerning going on.  I have placed a referral and provided the phone number above.  Please call them to schedule follow-up.  They will likely want to do a cystoscopy with a look at his bladder with a camera.  In the meantime, if it anytime he has fever, chills, abdominal pain, return of grossly bloody urine or clots, inability to urinate, heavy rectal bleeding please return to the ER for further evaluation.

## 2024-02-23 NOTE — ED NOTES
"Called lab and let them know that the urine sent to the lab has now been \"collected\" on the computer so they can process it now. Gave the tech the pt's MRN to verify his name  "

## 2024-02-29 ENCOUNTER — OFFICE VISIT (OUTPATIENT)
Dept: FAMILY MEDICINE | Facility: CLINIC | Age: 64
End: 2024-02-29
Payer: MEDICARE

## 2024-02-29 VITALS
HEART RATE: 63 BPM | DIASTOLIC BLOOD PRESSURE: 61 MMHG | HEIGHT: 67 IN | WEIGHT: 250 LBS | TEMPERATURE: 97.5 F | BODY MASS INDEX: 39.24 KG/M2 | OXYGEN SATURATION: 99 % | SYSTOLIC BLOOD PRESSURE: 95 MMHG | RESPIRATION RATE: 16 BRPM

## 2024-02-29 DIAGNOSIS — N32.89 BLADDER WALL THICKENING: ICD-10-CM

## 2024-02-29 DIAGNOSIS — R31.9 HEMATURIA, UNSPECIFIED TYPE: Primary | ICD-10-CM

## 2024-02-29 DIAGNOSIS — K62.5 BRBPR (BRIGHT RED BLOOD PER RECTUM): ICD-10-CM

## 2024-02-29 DIAGNOSIS — Z12.11 SCREEN FOR COLON CANCER: ICD-10-CM

## 2024-02-29 PROCEDURE — 99215 OFFICE O/P EST HI 40 MIN: CPT | Performed by: FAMILY MEDICINE

## 2024-02-29 RX ORDER — RESPIRATORY SYNCYTIAL VIRUS VACCINE 120MCG/0.5
0.5 KIT INTRAMUSCULAR ONCE
Qty: 1 EACH | Refills: 0 | Status: CANCELLED | OUTPATIENT
Start: 2024-02-29 | End: 2024-02-29

## 2024-02-29 NOTE — PROGRESS NOTES
Assessment & Plan     Hematuria, unspecified type  I personally reviewed the records from the emergency department with Jhoan and his sister who is his guardian and provides some of the history.  I explained that the urine test showed some evidence of blood, but no evidence of infection and the CT scan results showed bladder wall thickening, but was otherwise normal.  We discussed options moving forward and I recommended that Jhoan have a consultation with urology to see if any further workup/testing would be helpful.  The main goal, according to his sister, is to keep him comfortable which he appears to be.  They would like to avoid any invasive procedures like cystoscopies or colonoscopies.  We did not repeat any blood testing today since the labs when he was in the emergency department last week were essentially stable and he does not appear to be in any acute distress at this time.  They will stay in touch with me if symptoms change and seek medical attention as needed moving forward.  - Adult Urology  Referral; Future    Bladder wall thickening  - Adult Urology  Referral; Future    BRBPR (bright red blood per rectum)  The underlying cause of the blood that was noticed in the stool recently is still unclear, but this could be due to hemorrhoids.  There is some evidence that he has had hemorrhoids on the rectal exam.  It is reassuring that the CT scan does not show any significant GI abnormalities such as a mass or colitis    Screen for colon cancer  I put in an order for a fit test that can be done at their convenience.  - Fecal colorectal cancer screen (FIT); Future      40 minutes spent by me on the date of the encounter doing chart review, review of outside records, review of test results, interpretation of tests, patient visit, documentation, and discussion with family     MED REC REQUIRED  Post Medication Reconciliation Status:               Subjective   Jhoan is a 63 year old, presenting for  "the following health issues:  Hospital F/U        2/29/2024    11:55 AM   Additional Questions   Roomed by Mingo GRIFFITH     [facility]       ED/UC Followup:    Facility:  [facility] Emergency Department   Date of visit: 2/22/2024   Reason for visit: Gross hematuria   Current Status: Okay, still having symptoms      On 2/22/2024 he was seen in the emergency department due to concerns about hematuria and blood in his stools.  The workup included a urinalysis which showed 5 RBCs, although no nitrites, leukocytes WBCs or bacteria.  The CRP was elevated at 6.80.  His INR was in the normal range.  Hemoglobin and hematocrit levels were in the normal range.  The CMP results were normal.    They checked a CT scan of the abdomen and pelvis which showed bladder wall thickening, but was otherwise normal.    His sister is with him today and provides some of the history.  Jhoan has not been complaining of any pain with urination or in the abdomen or back.  He does not seem ill.  He is not having fevers, nausea or vomiting symptoms.  He otherwise seems to be stooling and voiding normally.  The stooling and urinary history is difficult to obtain since Jhoan is not able to provide this information for us and the group home and day treatment program has not been able to provide clear insight either.      Review of Systems  Constitutional, HEENT, cardiovascular, pulmonary, GI, , musculoskeletal, neuro, skin, endocrine and psych systems are negative, except as otherwise noted.      Objective    BP 95/61   Pulse 63   Temp 97.5  F (36.4  C) (Temporal)   Resp 16   Ht 1.702 m (5' 7\")   Wt 113.4 kg (250 lb)   SpO2 99%   BMI 39.16 kg/m    Body mass index is 39.16 kg/m .  Physical Exam   GENERAL: alert and no distress  HENT: nose and mouth without ulcers or lesions  NECK: no adenopathy, no asymmetry, masses, or scars  RESP: lungs clear to auscultation - no rales, rhonchi or wheezes  CV: regular rate and rhythm, normal S1 " S2, no S3 or S4, no murmur, click or rub, no peripheral edema  ABDOMEN: soft, nontender, no hepatosplenomegaly, no masses and bowel sounds normal  RECTAL: There are a few anal skin tags, likely from previous external hemorrhoids.  No active bleeding.  No fissures seen.  The digital rectal exam was deferred   MS: no gross musculoskeletal defects noted, no edema.  No CVA tenderness  SKIN: no suspicious lesions or rashes  NEURO: Normal strength and tone  PSYCH: affect normal            Signed Electronically by: Clifford Padilla DO

## 2024-03-30 ENCOUNTER — HEALTH MAINTENANCE LETTER (OUTPATIENT)
Age: 64
End: 2024-03-30

## 2024-04-02 ENCOUNTER — OFFICE VISIT (OUTPATIENT)
Dept: FAMILY MEDICINE | Facility: CLINIC | Age: 64
End: 2024-04-02
Payer: MEDICARE

## 2024-04-02 VITALS
BODY MASS INDEX: 35.34 KG/M2 | SYSTOLIC BLOOD PRESSURE: 102 MMHG | TEMPERATURE: 97.7 F | OXYGEN SATURATION: 98 % | WEIGHT: 219.9 LBS | DIASTOLIC BLOOD PRESSURE: 57 MMHG | HEART RATE: 65 BPM | HEIGHT: 66 IN | RESPIRATION RATE: 16 BRPM

## 2024-04-02 DIAGNOSIS — H40.50X0 GLAUCOMA ASSOCIATED WITH ANOMALY OF IRIS: ICD-10-CM

## 2024-04-02 DIAGNOSIS — E53.8 VITAMIN B12 DEFICIENCY: ICD-10-CM

## 2024-04-02 DIAGNOSIS — E66.01 MORBID OBESITY (H): ICD-10-CM

## 2024-04-02 DIAGNOSIS — G47.33 OSA (OBSTRUCTIVE SLEEP APNEA): ICD-10-CM

## 2024-04-02 DIAGNOSIS — Z13.220 SCREENING CHOLESTEROL LEVEL: ICD-10-CM

## 2024-04-02 DIAGNOSIS — Q13.2 GLAUCOMA ASSOCIATED WITH ANOMALY OF IRIS: ICD-10-CM

## 2024-04-02 DIAGNOSIS — E03.9 ACQUIRED HYPOTHYROIDISM: ICD-10-CM

## 2024-04-02 DIAGNOSIS — K21.9 GASTROESOPHAGEAL REFLUX DISEASE, UNSPECIFIED WHETHER ESOPHAGITIS PRESENT: ICD-10-CM

## 2024-04-02 DIAGNOSIS — Z29.11 NEED FOR VACCINATION AGAINST RESPIRATORY SYNCYTIAL VIRUS: ICD-10-CM

## 2024-04-02 DIAGNOSIS — Z00.00 ENCOUNTER FOR MEDICARE ANNUAL WELLNESS EXAM: Primary | ICD-10-CM

## 2024-04-02 DIAGNOSIS — H90.6 MIXED CONDUCTIVE AND SENSORINEURAL HEARING LOSS, BILATERAL: ICD-10-CM

## 2024-04-02 DIAGNOSIS — M17.0 OSTEOARTHRITIS OF BOTH KNEES, UNSPECIFIED OSTEOARTHRITIS TYPE: ICD-10-CM

## 2024-04-02 DIAGNOSIS — Q90.9 TRISOMY 21, DOWN SYNDROME: ICD-10-CM

## 2024-04-02 DIAGNOSIS — M1A.9XX0 CHRONIC GOUT WITHOUT TOPHUS, UNSPECIFIED CAUSE, UNSPECIFIED SITE: ICD-10-CM

## 2024-04-02 LAB
CHOLEST SERPL-MCNC: 155 MG/DL
FASTING STATUS PATIENT QL REPORTED: YES
HDLC SERPL-MCNC: 66 MG/DL
LDLC SERPL CALC-MCNC: 77 MG/DL
NONHDLC SERPL-MCNC: 89 MG/DL
TRIGL SERPL-MCNC: 58 MG/DL
TSH SERPL DL<=0.005 MIU/L-ACNC: 1.83 UIU/ML (ref 0.3–4.2)
URATE SERPL-MCNC: 6.2 MG/DL (ref 3.4–7)
VIT B12 SERPL-MCNC: 308 PG/ML (ref 232–1245)

## 2024-04-02 PROCEDURE — 90686 IIV4 VACC NO PRSV 0.5 ML IM: CPT | Performed by: FAMILY MEDICINE

## 2024-04-02 PROCEDURE — 90480 ADMN SARSCOV2 VAC 1/ONLY CMP: CPT | Performed by: FAMILY MEDICINE

## 2024-04-02 PROCEDURE — 84550 ASSAY OF BLOOD/URIC ACID: CPT | Performed by: FAMILY MEDICINE

## 2024-04-02 PROCEDURE — 99214 OFFICE O/P EST MOD 30 MIN: CPT | Mod: 25 | Performed by: FAMILY MEDICINE

## 2024-04-02 PROCEDURE — 80061 LIPID PANEL: CPT | Performed by: FAMILY MEDICINE

## 2024-04-02 PROCEDURE — 91320 SARSCV2 VAC 30MCG TRS-SUC IM: CPT | Performed by: FAMILY MEDICINE

## 2024-04-02 PROCEDURE — 84443 ASSAY THYROID STIM HORMONE: CPT | Performed by: FAMILY MEDICINE

## 2024-04-02 PROCEDURE — G0008 ADMIN INFLUENZA VIRUS VAC: HCPCS | Performed by: FAMILY MEDICINE

## 2024-04-02 PROCEDURE — 36415 COLL VENOUS BLD VENIPUNCTURE: CPT | Performed by: FAMILY MEDICINE

## 2024-04-02 PROCEDURE — G0439 PPPS, SUBSEQ VISIT: HCPCS | Performed by: FAMILY MEDICINE

## 2024-04-02 PROCEDURE — 82607 VITAMIN B-12: CPT | Performed by: FAMILY MEDICINE

## 2024-04-02 RX ORDER — RESPIRATORY SYNCYTIAL VIRUS VACCINE 120MCG/0.5
0.5 KIT INTRAMUSCULAR ONCE
Qty: 1 EACH | Refills: 0 | Status: SHIPPED | OUTPATIENT
Start: 2024-04-02 | End: 2024-04-02

## 2024-04-02 RX ORDER — LEVOTHYROXINE SODIUM 75 UG/1
75 TABLET ORAL DAILY
Qty: 90 TABLET | Refills: 3 | Status: SHIPPED | OUTPATIENT
Start: 2024-04-02

## 2024-04-02 RX ORDER — ALLOPURINOL 100 MG/1
100 TABLET ORAL DAILY
Qty: 90 TABLET | Refills: 3 | Status: SHIPPED | OUTPATIENT
Start: 2024-04-02

## 2024-04-02 RX ORDER — LATANOPROST 50 UG/ML
SOLUTION/ DROPS OPHTHALMIC
Qty: 7.5 ML | Refills: 3 | Status: SHIPPED | OUTPATIENT
Start: 2024-04-02

## 2024-04-02 SDOH — HEALTH STABILITY: PHYSICAL HEALTH: ON AVERAGE, HOW MANY DAYS PER WEEK DO YOU ENGAGE IN MODERATE TO STRENUOUS EXERCISE (LIKE A BRISK WALK)?: 0 DAYS

## 2024-04-02 ASSESSMENT — SOCIAL DETERMINANTS OF HEALTH (SDOH): HOW OFTEN DO YOU GET TOGETHER WITH FRIENDS OR RELATIVES?: TWICE A WEEK

## 2024-04-02 ASSESSMENT — PAIN SCALES - GENERAL: PAINLEVEL: NO PAIN (0)

## 2024-04-02 NOTE — COMMUNITY RESOURCES LIST (ENGLISH)
April 2, 2024           YOUR PERSONALIZED LIST OF SERVICES & PROGRAMS           & RECREATION    Sports      [employer] - Sports clubs and recreational activities  [address] [place] [address] (Distance: 2.1 miles)  Phone: [phone]  Website: http://www.[employer]Mojo  Language: English  Fee: Self pay  Accessibility: [place] accessible, Translation services      [employer] [place] - Sports clubs and recreational activities  [address] [place] [address] (Distance: 2.1 miles)  Language: English  Fee: Free, Self pay  Accessibility: Ada accessible      [employer]ASE[employer] AND SOFTBALL  Website: http://www.Roseonly.org    Classes/Groups      [employer] - Group fitness classes  [address] [place] [address] (Distance: 0.8 miles)  Phone: [phone]  Website: http://[email or web address]/  Language: English, Amharic  Fee: Sliding scale, Self pay  Accessibility: [place] accessible, Deaf or hard of hearing, Blind [facility] - Senior Strolls  [address]   [place] [address] (Distance: 0.6 miles)  Phone: [phone]  Website: http://www.Microm[employer].Pax8  Language: English  Fee: Free  Accessibility: Ada accessible      [facility] - Adult Enrichment  Phone: [phone]  Website: [email or web address]/adults-seniors/adult-enrichment/  Language: English  Hours: Mon 7:30 AM - 4:00 PM Tue 7:30 AM - 4:00 PM Wed 7:30 AM - 4:00 PM Thu 7:30 AM - 4:00 PM Fri 7:30 AM - 4:00 PM               IMPORTANT NUMBERS & WEBSITES        Emergency Services  911  .   United Way  211 http://211unitedway.org  .   Poison Control  (573) 591-8750 http://[place]poison.org http://wis[place]poison.org  .     Suicide and Crisis Lifeline  988 http://988lifeline.org  .   Childhelp National Child Abuse Hotline  968.674.2533 http://Childhelphotline.org   .   National Sexual Assault Hotline  (352) 231-8996 ([place])  http://Rainn.org   .     National Runaway Safeline  (362) 514-4747 ([place]) http://Sting CommunicationsrunaJotvine.com.org  .   Pregnancy & Postpartum Support  Call/text 673-745-6639  [place]: http://ppsupport[place].org  [place]: http://psichapters.com/wi  .   Substance Abuse National Helpline ([facility])  686-572-HELP (8323) http://Findtreatment.gov   .                DISCLAIMER: These resources have been generated via the [employer] Platform. [employer] does not endorse any service providers mentioned in this resource list. [employer] does not guarantee that the services mentioned in this resource list will be available to you or will improve your health or wellness.    [facility]

## 2024-04-02 NOTE — PROGRESS NOTES
Preventive Care Visit  [facility]  Clifford Padilla DO, Family Medicine  Apr 2, 2024  {Provider  Link to SmartSet :663070}    {PROVIDER CHARTING PREFERENCE:233274}    Subjective   Jhoan is a 63 year old, presenting for the following:  No chief complaint on file.    {(!) Visit Details have not yet been documented.  Please enter Visit Details and then use this list to pull in documentation. (Optional):577055}  {ROOMER if patient is in their first year of Medicare a vision screen is required click here to document the Vison screen and then refresh the note to pull in results  :638938}    Health Care Directive  Patient has a Health Care Directive on file  {(AWV REQUIRED) Advanced Care Planning Reviewed:173711}    [facility]    Wellness Visit Notes:  -Last colon cancer screening done 5/2019, due today, order previously placed for FIT by Dr. Aubree Padilla on 2/2024 (impression: negative)  -Last PSA done 1/2022 (impression: 0.22)  -Immunizations: RSV - Pt verbalized *** receive at pharmacy, COVID/flu - Pt *** to complete today      ***  {MA/LPN/RN Pre-Provider Visit Orders- hCG/UA/Strep (Optional):640188}  {SUPERLIST (Optional):196032}  {additonal problems for provider to add (Optional):961601}      1/26/2023   General Health   How would you rate your overall physical health? Good         1/26/2023   Nutrition   At least 4 servings of fruits and vegetables/day Yes         1/26/2023   Exercise   Frequency of exercise: None            1/26/2023   Fall Risk   Fallen 2 or more times in the past year? Yes          1/26/2023   Activities of Daily Living- Home Safety   Needs help with the following daily activites Telephone use    Transportation    Preparing meals    Housework    Laundry    Medication administration   Safety concerns in the home None of the above          No data to display                  1/26/2023   Hearing Screening   Hearing concerns? No concerns            No data to display                    {Rooming Staff Patient needs a PHQ as part of the AWV.  Use this link to complete and then refresh the note to pull results Link to PHQ2 Assessment :567009}  {USE TO PULL IN PHQ RESULTS FOR TODAY:363778}      1/26/2023   Substance Use   Alcohol more than 3/day or more than 7/wk No     Social History     Tobacco Use    Smoking status: Never    Smokeless tobacco: Never   Vaping Use    Vaping Use: Never used   Substance Use Topics    Alcohol use: Never    Drug use: Never     {Provider  If there are gaps in the social history shown above, please follow the link to update and then refresh the note Link to Social and Substance History :429985}  Last PSA:   PSA   Date Value Ref Range Status   12/31/2020 0.16 0 - 4 ug/L Final     Comment:     Assay Method:  Chemiluminescence using Siemens Vista analyzer     Prostate Specific Antigen Screen   Date Value Ref Range Status   01/11/2022 0.22 0.00 - 3.50 ug/L Final     ASCVD Risk   The 10-year ASCVD risk score (Any DK, et al., 2019) is: 4%    Values used to calculate the score:      Age: 63 years      Sex: Male      Is Non- : No      Diabetic: No      Tobacco smoker: No      Systolic Blood Pressure: 95 mmHg      Is BP treated: No      HDL Cholesterol: 57 mg/dL      Total Cholesterol: 152 mg/dL    {Link to Fracture Risk Assessment Tool (Optional):242394}    {Provider  Use the storyboard to review patient history, after sections have been marked as reviewed, refresh note to capture documentation:259904}  {Provider   REQUIRED AWV use this link to review and update sexual activity history  after section has been marked as reviewed, refresh note to capture documentation:545989}  Reviewed and updated as needed this visit by Provider                    {HISTORY OPTIONS (Optional):479807}  Current providers sharing in care for this patient include:  Patient Care Team:  Clifford Wooten DO as PCP - General (Family Medicine)  [place]  "Clifford Padilla DO as Assigned PCP    The following health maintenance items are reviewed in Epic and correct as of today:  Health Maintenance   Topic Date Due    COLORECTAL CANCER SCREENING  05/24/2020    RSV VACCINE (Pregnancy & 60+) (1 - 1-dose 60+ series) Never done    INFLUENZA VACCINE (1) 09/01/2023    COVID-19 Vaccine (6 - 2023-24 season) 09/01/2023    MEDICARE ANNUAL WELLNESS VISIT  01/26/2024    TSH W/FREE T4 REFLEX  01/26/2024    ANNUAL REVIEW OF HM ORDERS  02/28/2025    GLUCOSE  02/22/2027    LIPID  01/26/2028    ADVANCE CARE PLANNING  01/26/2028    HEPATITIS C SCREENING  Completed    HIV SCREENING  Completed    PHQ-2 (once per calendar year)  Completed    ZOSTER IMMUNIZATION  Completed    Pneumococcal Vaccine: Pediatrics (0 to 5 Years) and At-Risk Patients (6 to 64 Years)  Aged Out    IPV IMMUNIZATION  Aged Out    HPV IMMUNIZATION  Aged Out    MENINGITIS IMMUNIZATION  Aged Out    RSV MONOCLONAL ANTIBODY  Aged Out    DTAP/TDAP/TD IMMUNIZATION  Discontinued       {ROS Picklists (Optional):415578}     Objective    Exam  There were no vitals taken for this visit.   Estimated body mass index is 39.16 kg/m  as calculated from the following:    Height as of 2/29/24: 1.702 m (5' 7\").    Weight as of 2/29/24: 113.4 kg (250 lb).    Physical Exam  {Exam Choices (Optional):876889}  {Provider  The Mini-Cog is incomplete, use link to complete and refresh note Link to Mini-Cog :029973}       No data to display              {A Mini-Cog total score of 0-2 suggests the possibility of dementia, score of 3-5 suggests no dementia:558085}         Signed Electronically by: Clifford Padilla DO  {Email feedback regarding this note to [email or web address]   :479271}  "

## 2024-04-02 NOTE — PROGRESS NOTES
Preventive Care Visit  [facility]  Clifford Padilla DO, Family Medicine  Apr 2, 2024      Assessment & Plan     Encounter for Medicare annual wellness exam  I encouraged him to get regular physical activity and eat a healthy diet. We are going to check nonfasting labs as listed below.     Trisomy 21, Down syndrome  This issue is stable. He is currently living in a group home.     Morbid obesity (H)  We discussed dietary modifications to help with weight loss.    Acquired hypothyroidism  This issue appears to be stable.  He is due to have his thyroid labs rechecked.  - TSH WITH FREE T4 REFLEX; Future  - levothyroxine (SYNTHROID/LEVOTHROID) 75 MCG tablet; Take 1 tablet (75 mcg) by mouth daily    Osteoarthritis of both knees, unspecified osteoarthritis type  He feels like this issue is stable.  Steroid injections have been helpful for this in the past.    Vitamin B12 deficiency  He recently had a normal CBC and he is due to have his vitamin B12 levels rechecked.  - Vitamin B12; Future    MADELAINE (obstructive sleep apnea)  Stable on BiPAP.    Chronic gout without tophus, unspecified cause, unspecified site  Stable on allopurinol.  - Uric acid; Future  - allopurinol (ZYLOPRIM) 100 MG tablet; Take 1 tablet (100 mg) by mouth daily    Mixed conductive and sensorineural hearing loss, bilateral  He has hearing aids and follows closely with audiology.    Gastroesophageal reflux disease, unspecified whether esophagitis present  Stable on Pepcid.    Screening cholesterol level  - Lipid Profile (Chol, Trig, HDL, LDL calc); Future    Need for vaccination against respiratory syncytial virus  - respiratory syncytial virus vaccine, bivalent (ABRYSVO) injection; Inject 0.5 mLs into the muscle once for 1 dose    Glaucoma associated with anomaly of iris  - latanoprost (XALATAN) 0.005 % ophthalmic solution; INSTILL 1 DROP INTO THE RIGHT EYE EVERY EVENING    Patient has been advised of split billing requirements  "and indicates understanding: Yes          BMI  Estimated body mass index is 35.49 kg/m  as calculated from the following:    Height as of this encounter: 1.676 m (5' 6\").    Weight as of this encounter: 99.7 kg (219 lb 14.4 oz).   Weight management plan: Discussed healthy diet and exercise guidelines    Counseling  Appropriate preventive services were discussed with this patient, including applicable screening as appropriate for fall prevention, nutrition, physical activity, Tobacco-use cessation, weight loss and cognition.  Checklist reviewing preventive services available has been given to the patient.  Reviewed patient's diet, addressing concerns and/or questions.   Discussed possible causes of fatigue. Updated plan of care.  Patient reported difficulty with activities of daily living were addressed today.        Subjective   Jhoan is a 63 year old, presenting for the following:  Physical (AWV)        4/2/2024    11:09 AM   Additional Questions   Roomed by YAEL Oleary   Accompanied by Roger - staff from [facility]   Failed to redirect to the Timeline version of the [place] SmartLink.      Health Care Directive  Patient has a Health Care Directive on file  Advance care planning document is on file but is outdated.  Patient encouraged to updated.    HPI    Wellness Visit Notes:  -Last colon cancer screening done 5/2019, due today, order previously placed for FIT by Dr. Aubree Padilla on 2/2024 (impression: negative)  -Last PSA done 1/2022 (impression: 0.22)  -Immunizations: RSV - Pt reports not interested at this time, COVID/flu - Pt verbalized would like to discuss with provider      He has a medical history significant for trisomy 21 (Down syndrome), dementia, morbid obesity, hypothyroidism, osteoarthritis of both knees, gout, vitamin B12 deficiency, GERD, obstructive sleep apnea on BiPAP, hearing loss and bilateral impacted cerumen.  He lives in a group home and they report that things seem to be going well for " him.                4/2/2024   General Health   How would you rate your overall physical health? Good   Feel stress (tense, anxious, or unable to sleep) To some extent   (!) STRESS CONCERN      4/2/2024   Nutrition   Diet: Gluten-free/reduced         4/2/2024   Exercise   Days per week of moderate/strenous exercise 0 days   (!) EXERCISE CONCERN      4/2/2024   Social Factors   Frequency of gathering with friends or relatives Twice a week   Worry food won't last until get money to buy more Patient declined   Food not last or not have enough money for food? Patient declined   Do you have housing?  Yes   Are you worried about losing your housing? Patient declined   Lack of transportation? No   Unable to get utilities (heat,electricity)? No         4/2/2024   Fall Risk   Fallen 2 or more times in the past year? Yes   Trouble with walking or balance? Yes   Gait Speed Test (Document in seconds) 9.5   Gait Speed Test Interpretation Greater than 5.01 seconds - ABNORMAL          4/2/2024   Activities of Daily Living- Home Safety   Needs help with the following daily activites Preparing meals    Medication administration    Money management   Safety concerns in the home None of the above         4/2/2024   Dental   Dentist two times every year? (!) DECLINE         4/2/2024   Hearing Screening   Hearing concerns? None of the above         4/2/2024   Driving Risk Screening   Patient/family members have concerns about driving (!) DECLINE         4/2/2024   General Alertness/Fatigue Screening   Have you been more tired than usual lately? (!) YES         4/2/2024   Urinary Incontinence Screening   Bothered by leaking urine in past 6 months No            Today's PHQ-2 Score:       4/2/2024    11:01 AM   PHQ-2 ( 1999 Pf[employer])   Q1: Little interest or pleasure in doing things 1   Q2: Feeling down, depressed or hopeless 1   PHQ-2 Score 2   Q1: Little interest or pleasure in doing things Several days   Q2: Feeling down, depressed or  hopeless Several days   PHQ-2 Score 2           4/2/2024   Substance Use   Alcohol more than 3/day or more than 7/wk Not Applicable   Do you have a current opioid prescription? No   How severe/bad is pain from 1 to 10? 0/10 (No Pain)   Do you use any other substances recreationally? No     Social History     Tobacco Use    Smoking status: Never    Smokeless tobacco: Never   Vaping Use    Vaping Use: Never used   Substance Use Topics    Alcohol use: Never    Drug use: Never       Last PSA:   PSA   Date Value Ref Range Status   12/31/2020 0.16 0 - 4 ug/L Final     Comment:     Assay Method:  Chemiluminescence using Siemens Vista analyzer     Prostate Specific Antigen Screen   Date Value Ref Range Status   01/11/2022 0.22 0.00 - 3.50 ug/L Final     ASCVD Risk   The 10-year ASCVD risk score (Any SAHU, et al., 2019) is: 4.6%    Values used to calculate the score:      Age: 63 years      Sex: Male      Is Non- : No      Diabetic: No      Tobacco smoker: No      Systolic Blood Pressure: 102 mmHg      Is BP treated: No      HDL Cholesterol: 57 mg/dL      Total Cholesterol: 152 mg/dL            Reviewed and updated as needed this visit by Provider                      Current providers sharing in care for this patient include:  Patient Care Team:  Clifford Wooten DO as PCP - General (Family Medicine)  Clifford Wooten DO as Assigned PCP    The following health maintenance items are reviewed in Epic and correct as of today:  Health Maintenance   Topic Date Due    COLORECTAL CANCER SCREENING  05/24/2020    RSV VACCINE (Pregnancy & 60+) (1 - 1-dose 60+ series) Never done    INFLUENZA VACCINE (1) 09/01/2023    COVID-19 Vaccine (6 - 2023-24 season) 09/01/2023    TSH W/FREE T4 REFLEX  01/26/2024    ANNUAL REVIEW OF HM ORDERS  02/28/2025    MEDICARE ANNUAL WELLNESS VISIT  04/02/2025    GLUCOSE  02/22/2027    LIPID  01/26/2028    ADVANCE CARE PLANNING  04/02/2029    HEPATITIS C  "SCREENING  Completed    HIV SCREENING  Completed    PHQ-2 (once per calendar year)  Completed    ZOSTER IMMUNIZATION  Completed    Pneumococcal Vaccine: Pediatrics (0 to 5 Years) and At-Risk Patients (6 to 64 Years)  Aged Out    IPV IMMUNIZATION  Aged Out    HPV IMMUNIZATION  Aged Out    MENINGITIS IMMUNIZATION  Aged Out    RSV MONOCLONAL ANTIBODY  Aged Out    DTAP/TDAP/TD IMMUNIZATION  Discontinued         Review of Systems  Constitutional, HEENT, cardiovascular, pulmonary, GI, , musculoskeletal, neuro, skin, endocrine and psych systems are negative, except as otherwise noted.     Objective    Exam  /57 (BP Location: Left arm, Patient Position: Sitting, Cuff Size: Adult Regular)   Pulse 65   Temp 97.7  F (36.5  C) (Temporal)   Resp 16   Ht 1.676 m (5' 6\")   Wt 99.7 kg (219 lb 14.4 oz)   SpO2 98%   BMI 35.49 kg/m     Estimated body mass index is 35.49 kg/m  as calculated from the following:    Height as of this encounter: 1.676 m (5' 6\").    Weight as of this encounter: 99.7 kg (219 lb 14.4 oz).    Physical Exam      GENERAL: healthy, alert and no distress  EYES: Eyes grossly normal to inspection, PERRL and conjunctivae and sclerae normal  HENT: Nose and mouth without ulcers or lesions  NECK: no adenopathy, no asymmetry, masses, or scars and thyroid normal to palpation  RESP: lungs clear to auscultation - no rales, rhonchi or wheezes  CV: regular rate and rhythm, normal S1 S2, no S3 or S4, no murmur, click or rub, no peripheral edema and peripheral pulses strong  ABDOMEN: soft, nontender, no hepatosplenomegaly, no masses and bowel sounds normal  MS: He has some difficulty ambulating secondary to knee pain symptoms.  SKIN: no suspicious lesions or rashes  NEURO: Normal strength and tone  PSYCH: affect normal/bright          4/2/2024   Mini Cog   Mini-Cog Not Completed (choose reason) Mental handicap              Signed Electronically by: Clifford Padilla,     "

## 2024-04-02 NOTE — PATIENT INSTRUCTIONS
Preventive Care Advice   This is general advice given by our system to help you stay healthy. However, your care team may have specific advice just for you. Please talk to your care team about your preventive care needs.  Nutrition  Eat 5 or more servings of fruits and vegetables each day.  Try wheat bread, brown rice and whole grain pasta (instead of white bread, rice, and pasta).  Get enough calcium and vitamin D. Check the label on foods and aim for 100% of the RDA (recommended daily allowance).  Lifestyle  Exercise at least 150 minutes each week   (30 minutes a day, 5 days a week).  Do muscle strengthening activities 2 days a week. These help control your weight and prevent disease.  No smoking.  Wear sunscreen to prevent skin cancer.  Have a dental exam and cleaning every 6 months.  Yearly exams  See your health care team every year to talk about:  Any changes in your health.  Any medicines your care team has prescribed.  Preventive care, family planning, and ways to prevent chronic diseases.  Shots (vaccines)   HPV shots (up to age 26), if you've never had them before.  Hepatitis B shots (up to age 59), if you've never had them before.  COVID-19 shot: Get this shot when it's due.  Flu shot: Get a flu shot every year.  Tetanus shot: Get a tetanus shot every 10 years.  Pneumococcal, hepatitis A, and RSV shots: Ask your care team if you need these based on your risk.  Shingles shot (for age 50 and up).  General health tests  Diabetes screening:  Starting at age 35, Get screened for diabetes at least every 3 years.  If you are younger than age 35, ask your care team if you should be screened for diabetes.  Cholesterol test: At age 39, start having a cholesterol test every 5 years, or more often if advised.  Bone density scan (DEXA): At age 50, ask your care team if you should have this scan for osteoporosis (brittle bones).  Hepatitis C: Get tested at least once in your life.  STIs (sexually transmitted  infections)  Before age 24: Ask your care team if you should be screened for STIs.  After age 24: Get screened for STIs if you're at risk. You are at risk for STIs (including HIV) if:  You are sexually active with more than one person.  You don't use condoms every time.  You or a partner was diagnosed with a sexually transmitted infection.  If you are at risk for HIV, ask about PrEP medicine to prevent HIV.  Get tested for HIV at least once in your life, whether you are at risk for HIV or not.  Cancer screening tests  Cervical cancer screening: If you have a cervix, begin getting regular cervical cancer screening tests at age 21. Most people who have regular screenings with normal results can stop after age 65. Talk about this with your provider.  Breast cancer scan (mammogram): If you've ever had breasts, begin having regular mammograms starting at age 40. This is a scan to check for breast cancer.  Colon cancer screening: It is important to start screening for colon cancer at age 45.  Have a colonoscopy test every 10 years (or more often if you're at risk) Or, ask your provider about stool tests like a FIT test every year or Cologuard test every 3 years.  To learn more about your testing options, visit: https://[email or web address]/531175.pdf.  For help making a decision, visit: https://bit.ly/xs84377.  Prostate cancer screening test: If you have a prostate and are age 55 to 69, ask your provider if you would benefit from a yearly prostate cancer screening test.  Lung cancer screening: If you are a current or former smoker age 50 to 80, ask your care team if ongoing lung cancer screenings are right for you.  For informational purposes only. Not to replace the advice of your health care provider. Copyright   2023 [employer]. All rights reserved. Clinically reviewed by the [facility]itions Program. [employer] 429689 - REV 01/24.    Learning About Activities of Daily Living  What are activities  of daily living?     Activities of daily living (ADLs) are the basic self-care tasks you do every day. These include eating, bathing, dressing, and moving around.  As you age, and if you have health problems, you may find that it's harder to do some of these tasks. If so, your doctor can suggest ideas that may help.  To measure what kind of help you may need, your doctor will ask how well you are able to do ADLs. Let your doctor know if there are any tasks that you are having trouble doing. This is an important first step to getting help. And when you have the help you need, you can stay as independent as possible.  How will a doctor assess your ADLs?  Asking about ADLs is part of a routine health checkup your doctor will likely do as you age. Your health check might be done in a doctor's office, in your home, or at a hospital. The goal is to find out if you are having any problems that could make it hard to care for yourself or that make it unsafe for you to be on your own.  To measure your ADLs, your doctor will ask how hard it is for you to do routine tasks. Your doctor may also want to know if you have changed the way you do a task because of a health problem. Your doctor may watch how you:  Walk back and forth.  Keep your balance while you stand or walk.  Move from sitting to standing or from a bed to a chair.  Button or unbutton a shirt or sweater.  Remove and put on your shoes.  It's common to feel a little worried or anxious if you find you can't do all the things you used to be able to do. Talking with your doctor about ADLs is a way to make sure you're as safe as possible and able to care for yourself as well as you can. You may want to bring a caregiver, friend, or family member to your checkup. They can help you talk to your doctor.  Follow-up care is a key part of your treatment and safety. Be sure to make and go to all appointments, and call your doctor if you are having problems. It's also a good idea  to know your test results and keep a list of the medicines you take.  Current as of: October 24, 2023               Content Version: 14.0    9971-7028 [employer].   Care instructions adapted under license by your healthcare professional. If you have questions about a medical condition or this instruction, always ask your healthcare professional. [employer] disclaims any warranty or liability for your use of this information.      Learning About Stress  What is stress?     Stress is your body's response to a hard situation. Your body can have a physical, emotional, or mental response. Stress is a fact of life for most people, and it affects everyone differently. What causes stress for you may not be stressful for someone else.  A lot of things can cause stress. You may feel stress when you go on a job interview, take a test, or run a race. This kind of short-term stress is normal and even useful. It can help you if you need to work hard or react quickly. For example, stress can help you finish an important job on time.  Long-term stress is caused by ongoing stressful situations or events. Examples of long-term stress include long-term health problems, ongoing problems at work, or conflicts in your family. Long-term stress can harm your health.  How does stress affect your health?  When you are stressed, your body responds as though you are in danger. It makes hormones that speed up your heart, make you breathe faster, and give you a burst of energy. This is called the fight-or-flight stress response. If the stress is over quickly, your body goes back to normal and no harm is done.  But if stress happens too often or lasts too long, it can have bad effects. Long-term stress can make you more likely to get sick, and it can make symptoms of some diseases worse. If you tense up when you are stressed, you may develop neck, shoulder, or low back pain. Stress is linked to high blood pressure and  heart disease.  Stress also harms your emotional health. It can make you quach, tense, or depressed. Your relationships may suffer, and you may not do well at work or school.  What can you do to manage stress?  You can try these things to help manage stress:   Do something active. Exercise or activity can help reduce stress. Walking is a great way to get started. Even everyday activities such as housecleaning or yard work can help.  Try yoga or nallely chi. These techniques combine exercise and meditation. You may need some training at first to learn them.  Do something you enjoy. For example, listen to music or go to a movie. Practice your hobby or do volunteer work.  Meditate. This can help you relax, because you are not worrying about what happened before or what may happen in the future.  Do guided imagery. Imagine yourself in any setting that helps you feel calm. You can use online videos, books, or a teacher to guide you.  Do breathing exercises. For example:  From a standing position, bend forward from the waist with your knees slightly bent. Let your arms dangle close to the floor.  Breathe in slowly and deeply as you return to a standing position. Roll up slowly and lift your head last.  Hold your breath for just a few seconds in the standing position.  Breathe out slowly and bend forward from the waist.  Let your feelings out. Talk, laugh, cry, and express anger when you need to. Talking with supportive friends or family, a counselor, or a anita leader about your feelings is a healthy way to relieve stress. Avoid discussing your feelings with people who make you feel worse.  Write. It may help to write about things that are bothering you. This helps you find out how much stress you feel and what is causing it. When you know this, you can find better ways to cope.  What can you do to prevent stress?  You might try some of these things to help prevent stress:  Manage your time. This helps you find time to do the  "things you want and need to do.  Get enough sleep. Your body recovers from the stresses of the day while you are sleeping.  Get support. Your family, friends, and community can make a difference in how you experience stress.  Limit your news feed. Avoid or limit time on social media or news that may make you feel stressed.  Do something active. Exercise or activity can help reduce stress. Walking is a great way to get started.  Where can you learn more?  Go to https://www.[employer].net/patiented  Enter N032 in the search box to learn more about \"Learning About Stress.\"  Current as of: October 24, 2023               Content Version: 14.0    8978-1846 [employer].   Care instructions adapted under license by your healthcare professional. If you have questions about a medical condition or this instruction, always ask your healthcare professional. [employer] disclaims any warranty or liability for your use of this information.      Learning About Sleeping Well  What does sleeping well mean?     Sleeping well means getting enough sleep to feel good and stay healthy. How much sleep is enough varies among people.  The number of hours you sleep and how you feel when you wake up are both important. If you do not feel refreshed, you probably need more sleep. Another sign of not getting enough sleep is feeling tired during the day.  Experts recommend that adults get at least 7 or more hours of sleep per day. Children and older adults need more sleep.  Why is getting enough sleep important?  Getting enough quality sleep is a basic part of good health. When your sleep suffers, your physical health, mood, and your thoughts can suffer too. You may find yourself feeling more grumpy or stressed. Not getting enough sleep also can lead to serious problems, including injury, accidents, anxiety, and depression.  What might cause poor sleeping?  Many things can cause sleep problems, including:  Changes to your " "sleep schedule.  Stress. Stress can be caused by fear about a single event, such as giving a speech. Or you may have ongoing stress, such as worry about work or school.  Depression, anxiety, and other mental or emotional conditions.  Changes in your sleep habits or surroundings. This includes changes that happen where you sleep, such as noise, light, or sleeping in a different bed. It also includes changes in your sleep pattern, such as having jet lag or working a late shift.  Health problems, such as pain, breathing problems, and restless legs syndrome.  Lack of regular exercise.  Using alcohol, nicotine, or caffeine before bed.  How can you help yourself?  Here are some tips that may help you sleep more soundly and wake up feeling more refreshed.  Your sleeping area   Use your bedroom only for sleeping and sex. A bit of light reading may help you fall asleep. But if it doesn't, do your reading elsewhere in the house. Try not to use your TV, computer, smartphone, or tablet while you are in bed.  Be sure your bed is big enough to stretch out comfortably, especially if you have a sleep partner.  Keep your bedroom quiet, dark, and cool. Use curtains, blinds, or a sleep mask to block out light. To block out noise, use earplugs, soothing music, or a \"white noise\" machine.  Your evening and bedtime routine   Create a relaxing bedtime routine. You might want to take a warm shower or bath, or listen to soothing music.  Go to bed at the same time every night. And get up at the same time every morning, even if you feel tired.  What to avoid   Limit caffeine (coffee, tea, caffeinated sodas) during the day, and don't have any for at least 6 hours before bedtime.  Avoid drinking alcohol before bedtime. Alcohol can cause you to wake up more often during the night.  Try not to smoke or use tobacco, especially in the evening. Nicotine can keep you awake.  Limit naps during the day, especially close to bedtime.  Avoid lying in bed " "awake for too long. If you can't fall asleep or if you wake up in the middle of the night and can't get back to sleep within about 20 minutes, get out of bed and go to another room until you feel sleepy.  Avoid taking medicine right before bed that may keep you awake or make you feel hyper or energized. Your doctor can tell you if your medicine may do this and if you can take it earlier in the day.  If you can't sleep   Imagine yourself in a peaceful, pleasant scene. Focus on the details and feelings of being in a place that is relaxing.  Get up and do a quiet or boring activity until you feel sleepy.  Avoid drinking any liquids before going to bed to help prevent waking up often to use the bathroom.  Where can you learn more?  Go to https://www.[employer].net/patiented  Enter J942 in the search box to learn more about \"Learning About Sleeping Well.\"  Current as of: July 10, 2023               Content Version: 14.0    0837-3683 [employer].   Care instructions adapted under license by your healthcare professional. If you have questions about a medical condition or this instruction, always ask your healthcare professional. [employer] disclaims any warranty or liability for your use of this information.      "

## 2024-05-29 ENCOUNTER — OFFICE VISIT (OUTPATIENT)
Dept: FAMILY MEDICINE | Facility: CLINIC | Age: 64
End: 2024-05-29
Payer: MEDICARE

## 2024-05-29 VITALS
RESPIRATION RATE: 16 BRPM | DIASTOLIC BLOOD PRESSURE: 65 MMHG | OXYGEN SATURATION: 96 % | TEMPERATURE: 98.9 F | HEART RATE: 66 BPM | HEIGHT: 66 IN | BODY MASS INDEX: 35.36 KG/M2 | SYSTOLIC BLOOD PRESSURE: 101 MMHG | WEIGHT: 220 LBS

## 2024-05-29 DIAGNOSIS — M17.0 OSTEOARTHRITIS OF BOTH KNEES, UNSPECIFIED OSTEOARTHRITIS TYPE: Primary | ICD-10-CM

## 2024-05-29 DIAGNOSIS — E66.01 MORBID OBESITY (H): ICD-10-CM

## 2024-05-29 DIAGNOSIS — H61.23 BILATERAL IMPACTED CERUMEN: ICD-10-CM

## 2024-05-29 DIAGNOSIS — L30.9 DERMATITIS: ICD-10-CM

## 2024-05-29 PROCEDURE — 69210 REMOVE IMPACTED EAR WAX UNI: CPT | Performed by: FAMILY MEDICINE

## 2024-05-29 PROCEDURE — 99213 OFFICE O/P EST LOW 20 MIN: CPT | Mod: 25 | Performed by: FAMILY MEDICINE

## 2024-05-29 RX ORDER — RESPIRATORY SYNCYTIAL VIRUS VACCINE 120MCG/0.5
0.5 KIT INTRAMUSCULAR ONCE
Qty: 1 EACH | Refills: 0 | Status: CANCELLED | OUTPATIENT
Start: 2024-05-29 | End: 2024-05-29

## 2024-05-29 RX ORDER — TRIAMCINOLONE ACETONIDE 1 MG/G
CREAM TOPICAL
Qty: 45 G | Refills: 3 | Status: SHIPPED | OUTPATIENT
Start: 2024-05-29

## 2024-05-29 ASSESSMENT — PAIN SCALES - GENERAL: PAINLEVEL: NO PAIN (0)
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
## 2024-11-05 ENCOUNTER — MEDICAL CORRESPONDENCE (OUTPATIENT)
Dept: HEALTH INFORMATION MANAGEMENT | Facility: CLINIC | Age: 64
End: 2024-11-05
Payer: MEDICARE

## 2024-11-05 ENCOUNTER — TELEPHONE (OUTPATIENT)
Dept: FAMILY MEDICINE | Facility: CLINIC | Age: 64
End: 2024-11-05
Payer: MEDICARE

## 2024-11-05 NOTE — TELEPHONE ENCOUNTER
Forms/Letter Request    Type of form/letter: OTHER: treatments  Famotidine 20 mg  And refresh celluvisc 1% oph soln       Do we have the form/letter: Yes: w/ DE    Who is the form from? [facility] (if other please explain)    Where did/will the form come from? form was faxed in    When is form/letter needed by: asap    How would you like the form/letter returned: Fax : [phone]

## 2024-11-29 ENCOUNTER — TELEPHONE (OUTPATIENT)
Dept: FAMILY MEDICINE | Facility: CLINIC | Age: 64
End: 2024-11-29
Payer: MEDICARE

## 2024-11-29 NOTE — TELEPHONE ENCOUNTER
Forms/Letter Request    Type of form/letter: OTHER: icd 10 verification form         Do we have the form/letter: Yes:  form    Who is the form from?  [facility]  (if other please explain)    Where did/will the form come from? form was faxed in    When is form/letter needed by:  asap will form to HRN for review    How would you like the form/letter returned: Fax : [phone]    Patient Notified form requests are processed in 5-7 business days:Yes    Could we send this information to you in [employer] or would you prefer to receive a phone call?:   No preference   Okay to leave a detailed message?: N/A at Other phone number:  [phone]

## 2024-12-02 NOTE — TELEPHONE ENCOUNTER
Form completed and signed, faxed and sent for scanning  [facility] Unit Coordinator

## 2024-12-02 NOTE — TELEPHONE ENCOUNTER
Form unable to be completed in full by HRN. Form prepped as able.   ICD-10 list printed and placed with forms for provider to review. Form placed on Dr. Aubree Padilla's desk and encounter routed to provider.     Anirudh Solis RN

## 2025-02-17 ENCOUNTER — OFFICE VISIT (OUTPATIENT)
Dept: FAMILY MEDICINE | Facility: CLINIC | Age: 65
End: 2025-02-17
Payer: MEDICARE

## 2025-02-17 VITALS
WEIGHT: 203.3 LBS | HEART RATE: 63 BPM | TEMPERATURE: 97.7 F | BODY MASS INDEX: 32.81 KG/M2 | DIASTOLIC BLOOD PRESSURE: 53 MMHG | OXYGEN SATURATION: 98 % | RESPIRATION RATE: 18 BRPM | SYSTOLIC BLOOD PRESSURE: 83 MMHG

## 2025-02-17 DIAGNOSIS — L03.119 CELLULITIS OF LOWER EXTREMITY, UNSPECIFIED LATERALITY: Primary | ICD-10-CM

## 2025-02-17 DIAGNOSIS — R79.89 OTHER SPECIFIED ABNORMAL FINDINGS OF BLOOD CHEMISTRY: ICD-10-CM

## 2025-02-17 DIAGNOSIS — E03.9 ACQUIRED HYPOTHYROIDISM: ICD-10-CM

## 2025-02-17 DIAGNOSIS — I89.0 LYMPHEDEMA: ICD-10-CM

## 2025-02-17 DIAGNOSIS — K21.9 GASTROESOPHAGEAL REFLUX DISEASE, UNSPECIFIED WHETHER ESOPHAGITIS PRESENT: ICD-10-CM

## 2025-02-17 DIAGNOSIS — I87.2 STASIS DERMATITIS OF BOTH LEGS: ICD-10-CM

## 2025-02-17 DIAGNOSIS — M1A.9XX0 CHRONIC GOUT WITHOUT TOPHUS, UNSPECIFIED CAUSE, UNSPECIFIED SITE: ICD-10-CM

## 2025-02-17 LAB
ERYTHROCYTE [DISTWIDTH] IN BLOOD BY AUTOMATED COUNT: 13.5 % (ref 10–15)
HCT VFR BLD AUTO: 44.1 % (ref 40–53)
HGB BLD-MCNC: 15 G/DL (ref 13.3–17.7)
MCH RBC QN AUTO: 32.1 PG (ref 26.5–33)
MCHC RBC AUTO-ENTMCNC: 34 G/DL (ref 31.5–36.5)
MCV RBC AUTO: 94 FL (ref 78–100)
PLATELET # BLD AUTO: 190 10E3/UL (ref 150–450)
RBC # BLD AUTO: 4.68 10E6/UL (ref 4.4–5.9)
WBC # BLD AUTO: 4.6 10E3/UL (ref 4–11)

## 2025-02-17 PROCEDURE — 85027 COMPLETE CBC AUTOMATED: CPT | Performed by: FAMILY MEDICINE

## 2025-02-17 PROCEDURE — 99214 OFFICE O/P EST MOD 30 MIN: CPT | Performed by: FAMILY MEDICINE

## 2025-02-17 PROCEDURE — 80048 BASIC METABOLIC PNL TOTAL CA: CPT | Performed by: FAMILY MEDICINE

## 2025-02-17 PROCEDURE — 36415 COLL VENOUS BLD VENIPUNCTURE: CPT | Performed by: FAMILY MEDICINE

## 2025-02-17 RX ORDER — TRIAMCINOLONE ACETONIDE 1 MG/G
CREAM TOPICAL
Qty: 45 G | Refills: 3 | Status: SHIPPED | OUTPATIENT
Start: 2025-02-17

## 2025-02-17 RX ADMIN — CYANOCOBALAMIN 1000 MCG: 1000 INJECTION, SOLUTION INTRAMUSCULAR; SUBCUTANEOUS at 15:55

## 2025-02-17 ASSESSMENT — PAIN SCALES - GENERAL: PAINLEVEL_OUTOF10: MODERATE PAIN (4)

## 2025-02-17 NOTE — PATIENT INSTRUCTIONS
1. Cellulitis of lower extremity, unspecified laterality (Primary)  - amoxicillin-clavulanate (AUGMENTIN) 875-125 MG tablet; Take 1 tablet by mouth 2 times daily.  Dispense: 14 tablet; Refill: 0    2. Stasis dermatitis of both legs  Us  to rule out DVT  - US Lower Extremity Venous Duplex Bilateral; Future  Encouraged gradual physical activity   Ace wrap  or compression stocking up to knee  Or the therapist at [facility] can provider wt  3. Lymphedema  - US Lower Extremity Venous Duplex Bilateral; Future  - Lymphedema Therapy  Referral; Future    4. Other specified abnormal findings of blood chemistry  - US Lower Extremity Venous Duplex Bilateral; Future

## 2025-02-17 NOTE — PROGRESS NOTES
{PROVIDER CHARTING PREFERENCE:478755}    Subjective   Jhoan is a 64 year old, presenting for the following health issues:  Leg Problem    History of Present Illness       Reason for visit:  Leg check    He eats 2-3 servings of fruits and vegetables daily.He consumes 3 sweetened beverage(s) daily.He exercises with enough effort to increase his heart rate 9 or less minutes per day.  He exercises with enough effort to increase his heart rate 3 or less days per week.   He is taking medications regularly.   [facility] care        Concern - Leg discoloration   Onset: 3-7 days ago   Description: Leg redness/purple discoloration   Location: Both legs   Pain in left   Mild Swelling   Fatigue   Shares that redness is typically normal but has noticed that it has worsened the past couple days   Intensity: moderate  Progression of Symptoms:  worsening  Accompanying Signs & Symptoms: None   Previous history of similar problem: Patient had an  visit on 10/28/24 for similar symptoms   Precipitating factors:        Worsened by: None   Alleviating factors:        Improved by:   Therapies tried and outcome: None    Here with support staff from  [facility] .  Legs always red  Now more swollen  He also complains of pain.both legs, and not specific to toes or feet.  Does not ambulate much  No chest pain  No fever       {additonal problems for provider to add (Optional):711734}    {ROS Picklists (Optional):397774}      Objective    There were no vitals taken for this visit.  There is no height or weight on file to calculate BMI.  Physical Exam   {Exam List (Optional):739535}    {Diagnostic Test Results (Optional):375242}        Signed Electronically by: Heather Hill MD  {Email feedback regarding this note to [email or web address]   :[id number]}   less minutes per day.  He exercises with enough effort to increase his heart rate 3 or less days per week.   He is taking medications regularly.   [facility] care        Concern - Leg discoloration   Onset: 3-7 days ago   Description: Leg redness/purple discoloration   Location: Both legs   Pain in left   Mild Swelling   Fatigue   Shares that redness is typically normal but has noticed that it has worsened the past couple days   Intensity: moderate  Progression of Symptoms:  worsening  Accompanying Signs & Symptoms: None   Previous history of similar problem: Patient had an  visit on 10/28/24 for similar symptoms   Precipitating factors:        Worsened by: None   Alleviating factors:        Improved by:   Therapies tried and outcome: None    Here with support staff from  [facility] .  Legs always red  Now more swollen  He also complains of pain.both legs, and not specific to toes or feet.  Does not ambulate much. Does not like to walk much and is ambulant with staff help.  No chest pain, No fever .    Has been using compression stocking on and off              Review of Systems  Constitutional, HEENT, cardiovascular, pulmonary, GI, , musculoskeletal, neuro, skin, endocrine and psych systems are negative, except as otherwise noted.      Objective    BP (!) 83/53 (BP Location: Left arm, Patient Position: Sitting, Cuff Size: Adult Regular)   Pulse 63   Temp 97.7  F (36.5  C) (Skin)   Resp 18   Wt 92.2 kg (203 lb 4.8 oz)   SpO2 98%   BMI 32.81 kg/m    Body mass index is 32.81 kg/m .  Physical Exam   GENERAL: alert and no distress  Extremities: edematous up to the knee. Moderate to sever pitting edema- with redness extending from  legs to the foot.  He is wearing compression stocking.  Does complains of pain - all over the legs  Pulses intact  NECK: no adenopathy, no asymmetry, masses, or scars  RESP: lungs clear to auscultation - no rales, rhonchi or wheezes  CV: regular rate and rhythm, normal S1 S2,  no S3 or S4, no murmur, click or rub, no peripheral edema  ABDOMEN: soft, nontender, no hepatosplenomegaly, no masses and bowel sounds normal  Rest if the MS: no gross musculoskeletal defects noted, no edema      Ordering of each unique test  Prescription drug management  I spent a total of 30 minutes on the day of the visit.   Time spent by me today doing chart review, history and exam, documentation and further activities per the note        Signed Electronically by: Heather Hill MD

## 2025-02-18 LAB
ANION GAP SERPL CALCULATED.3IONS-SCNC: 9 MMOL/L (ref 7–15)
BUN SERPL-MCNC: 18.3 MG/DL (ref 8–23)
CALCIUM SERPL-MCNC: 9.1 MG/DL (ref 8.8–10.4)
CHLORIDE SERPL-SCNC: 105 MMOL/L (ref 98–107)
CREAT SERPL-MCNC: 0.93 MG/DL (ref 0.67–1.17)
EGFRCR SERPLBLD CKD-EPI 2021: >90 ML/MIN/1.73M2
GLUCOSE SERPL-MCNC: 102 MG/DL (ref 70–99)
HCO3 SERPL-SCNC: 27 MMOL/L (ref 22–29)
POTASSIUM SERPL-SCNC: 4.5 MMOL/L (ref 3.4–5.3)
SODIUM SERPL-SCNC: 141 MMOL/L (ref 135–145)

## 2025-03-11 ENCOUNTER — THERAPY VISIT (OUTPATIENT)
Dept: PHYSICAL THERAPY | Facility: CLINIC | Age: 65
End: 2025-03-11
Attending: FAMILY MEDICINE
Payer: MEDICARE

## 2025-03-11 DIAGNOSIS — I89.0 LYMPHEDEMA: Primary | ICD-10-CM

## 2025-03-11 DIAGNOSIS — I87.2 CHRONIC VENOUS INSUFFICIENCY OF LOWER EXTREMITY: ICD-10-CM

## 2025-03-11 PROCEDURE — 97535 SELF CARE MNGMENT TRAINING: CPT | Mod: GP | Performed by: PHYSICAL THERAPIST

## 2025-03-11 PROCEDURE — 97161 PT EVAL LOW COMPLEX 20 MIN: CPT | Mod: GP | Performed by: PHYSICAL THERAPIST

## 2025-03-11 NOTE — PROGRESS NOTES
"PHYSICAL THERAPY EVALUATION  Type of Visit: Evaluation       Fall Risk Screen:  Fall screen completed by: PT  Have you fallen 2 or more times in the past year?: No  Have you fallen and had an injury in the past year?: No  Is patient a fall risk?: Yes  Fall screen comments: Fall yesterday, missed a step in the house and fell down 2 stairs. Only known fall in more than 1 year    Subjective         Presenting condition or subjective complaint: discoloration on legs  Date of onset: 02/17/25    Relevant medical history: Arthritis; Thyroid problems   Dates & types of surgery:      Prior diagnostic imaging/testing results: CT scan     Prior therapy history for the same diagnosis, illness or injury: No      Prior Level of Function  Transfers: Assistive equipment and person  Ambulation: Assistive equipment and person  ADL: Assistive equipment and person  -does shower and dress independently, but staff assist with setup  IADL:  Staff assist for all IADL    Living Environment  Social support: With a caregiver/helper   Type of home: House; Multi-level   Stairs to enter the home: Yes 2 Is there a railing: Yes     Ramp: No   Stairs inside the home: Yes 2 Is there a railing: Yes     Help at home: Self Cares (home health aide/personal care attendant, family, etc); Home management tasks (cooking, cleaning); Medication and/or finances; Home and Yard maintenance tasks; Assist for driving and community activities; Emergency call system  Equipment owned: Straight Cane; Walker     Employment: No    Hobbies/Interests: drawing listening to music watching tv    Patient goals for therapy:      Pain assessment:  Pain in knees from fall yesterday, some soreness to legs but \"not bad\"     Objective       EDEMA EVALUATION  Additional history:  Body part affected by edema: legs  If cancer related, treatment: na  If not cancer related, problems with veins or cause of swelling:    Distance able to walk: less than a mile  Time able to stand: 15 " mins  Sensation problems in hands/feet: No    Edema etiology: Chronic Venous Insufficiency, Infection    FUNCTIONAL SCALES   Lymphedema Life Impact Scale (LLIS):  pt unable to complete    Cognitive Status Examination  Orientation: Person   Level of Consciousness: Alert  Follows Commands and Answers Questions: 50% of the time  Personal Safety and Judgement: Impaired  Memory: Intact    EDEMA  Skin Condition: Dryness, Hemosiderin deposits, Intact, Non-pitting, Pitting, Venous distention  Scar: No  Capillary Refill: Symmetrical  Stemmer Sign: RLE: +; LLE -  Ulceration: No    GIRTH MEASUREMENTS: Refer to separate girth measurement flowsheet for specific measurements.    VOLUME LE  Right LE Total Volume (mL): 2512.2  Left LE Total Volume (mL): 2813.77  LE Limb Comparison:  Identify AFFECTED Limb Volume-Vi (ml): 2813.8  Identify UNAFFECTED Limb Volume-Vu (ml): 2512.2  % LE Swelling: 10.7  LE Limb % Difference: 12.01    RANGE OF MOTION: LE ROM WFL  STRENGTH: LE Strength WFL  POSTURE:  mildly forward flexed, WFL  PALPATION: Non-tender to BLE, skin as above  ACTIVITIES OF DAILY LIVING: SBA/ Mod I. Lives in group home has assist for meals, laundry and medication. Mod I for dressing and bathing with setup  BED MOBILITY: Independent  TRANSFERS: SBA  GAIT/LOCOMOTION: SBA, use of SEC in public often no device at home  BALANCE:  Not formally assessed, has falls risk factors noted  SENSATION:  Not formally assessed, likely mild deficits  VASCULAR: Vascular concerns, Rubor of dependency    Assessment & Plan   CLINICAL IMPRESSIONS  Medical Diagnosis: Lymphedema    Treatment Diagnosis: Lymphedema   Impression/Assessment: Patient is a 64 year old male with BLE edema and recurrent cellulitis infection complaints.  The following significant findings have been identified: Impaired sensation, Inflammation, Edema, and Impaired gait. These impairments interfere with their ability to perform recreational activities, household mobility,  community mobility, and fit of clothing  as compared to previous level of function.     Clinical Decision Making (Complexity):  Clinical Presentation: Stable/Uncomplicated  Clinical Presentation Rationale: based on medical and personal factors listed in PT evaluation  Clinical Decision Making (Complexity): Low complexity    PLAN OF CARE  Treatment Interventions:  Interventions: Manual Therapy, Therapeutic Exercise, Self-Care/Home Management, Gradient Compression Bandaging    Long Term Goals     PT Goal 1  Goal Identifier: Lymphedema Home Program  Goal Description: Pt with assist from group home staff will be independent with elevation, diet changes, and skin care to best manage swelling to decrease symptoms.  Target Date: 06/08/25  PT Goal 2  Goal Identifier: Volume  Goal Description: Pt will have stable or reduced volume for improved tissue integrity, decreased risk of infection, and improved fit of clothing  Target Date: 06/08/25  PT Goal 3  Goal Identifier: Maintenance  Goal Description: Pt will obtain and utilize appropriate compression garments as instructed AND home management tools/program for long term management of chronic condition to prevent tissue fibrosis, infection, wound and other secondary sequelae  Target Date: 06/08/25      Frequency of Treatment: up to 4 visits  Duration of Treatment: 90 days    Recommended Referrals to Other Professionals:   for velcro garments  Education Assessment:   Learner/Method: Patient;Caregiver;Listening;Reading;Demonstration  Education Comments: Edema management and compression    Risks and benefits of evaluation/treatment have been explained.   Patient/Family/caregiver agrees with Plan of Care.     Evaluation Time:     PT Eval, Low Complexity Minutes (00294): 16       Signing Clinician: Amber Bender, PT        [facility]                                                                                   OUTPATIENT PHYSICAL  THERAPY      PLAN OF TREATMENT FOR OUTPATIENT REHABILITATION   Patient's Last Name, First Name, Christian Guerra YOB: 1960   Provider's Name   [facility]   Medical Record No.  [id number]     Onset Date: 02/17/25  Start of Care Date: 03/11/25     Medical Diagnosis:  Lymphedema      PT Treatment Diagnosis:  Lymphedema Plan of Treatment  Frequency/Duration: up to 4 visits/ 90 days    Certification date from 03/11/25 to 06/08/25         See note for plan of treatment details and functional goals     Amber Bender, PT                         I CERTIFY THE NEED FOR THESE SERVICES FURNISHED UNDER        THIS PLAN OF TREATMENT AND WHILE UNDER MY CARE     (Physician attestation of this document indicates review and certification of the therapy plan).              Referring Provider:  Heather Hill    Initial Assessment  See Epic Evaluation- Start of Care Date: 03/11/25

## 2025-03-11 NOTE — PATIENT INSTRUCTIONS
Edema Recommendations:    Elevate legs at least 15 minutes 3x a day  Watch sodium intake (watch for salty snack foods and drinks)  Keep legs clean, dry and well lotioned  Try and keep well hydrated, encourage caffeine free drinks, low sugar, low salt. Water is best!  Try and keep good protein in your diet    *Please call to schedule a garment fitting for new velcro compression garments of the legs  - you can call to schedule this anytime        This is a 2 piece velcro compression garment. There is a base sock that has a slight compression to the foot, no compression to the upper leg. The velcro piece fits gently over the leg and the straps can be adjusted to increase compression the same way a compression sock would work, but is often more comfortable and much easier to get on.

## 2025-03-18 ENCOUNTER — OFFICE VISIT (OUTPATIENT)
Dept: FAMILY MEDICINE | Facility: CLINIC | Age: 65
End: 2025-03-18
Payer: MEDICARE

## 2025-03-18 VITALS
RESPIRATION RATE: 18 BRPM | HEIGHT: 67 IN | SYSTOLIC BLOOD PRESSURE: 115 MMHG | HEART RATE: 62 BPM | TEMPERATURE: 98.4 F | DIASTOLIC BLOOD PRESSURE: 66 MMHG | BODY MASS INDEX: 32.08 KG/M2 | OXYGEN SATURATION: 99 % | WEIGHT: 204.4 LBS

## 2025-03-18 DIAGNOSIS — M1A.9XX0 CHRONIC GOUT WITHOUT TOPHUS, UNSPECIFIED CAUSE, UNSPECIFIED SITE: ICD-10-CM

## 2025-03-18 DIAGNOSIS — K21.9 GASTROESOPHAGEAL REFLUX DISEASE, UNSPECIFIED WHETHER ESOPHAGITIS PRESENT: ICD-10-CM

## 2025-03-18 DIAGNOSIS — Z00.00 ENCOUNTER FOR MEDICARE ANNUAL WELLNESS EXAM: Primary | ICD-10-CM

## 2025-03-18 DIAGNOSIS — M17.0 OSTEOARTHRITIS OF BOTH KNEES, UNSPECIFIED OSTEOARTHRITIS TYPE: ICD-10-CM

## 2025-03-18 DIAGNOSIS — Z87.39 HISTORY OF GOUT: ICD-10-CM

## 2025-03-18 DIAGNOSIS — I87.2 STASIS DERMATITIS OF BOTH LEGS: ICD-10-CM

## 2025-03-18 DIAGNOSIS — R60.0 BILATERAL LOWER EXTREMITY EDEMA: ICD-10-CM

## 2025-03-18 DIAGNOSIS — E53.8 VITAMIN B12 DEFICIENCY (NON ANEMIC): ICD-10-CM

## 2025-03-18 DIAGNOSIS — E03.9 ACQUIRED HYPOTHYROIDISM: ICD-10-CM

## 2025-03-18 DIAGNOSIS — Z12.11 SCREEN FOR COLON CANCER: ICD-10-CM

## 2025-03-18 DIAGNOSIS — Q90.9 TRISOMY 21, DOWN SYNDROME: ICD-10-CM

## 2025-03-18 DIAGNOSIS — H61.23 BILATERAL IMPACTED CERUMEN: ICD-10-CM

## 2025-03-18 DIAGNOSIS — E66.01 MORBID OBESITY (H): ICD-10-CM

## 2025-03-18 PROCEDURE — 90677 PCV20 VACCINE IM: CPT | Performed by: FAMILY MEDICINE

## 2025-03-18 PROCEDURE — 3074F SYST BP LT 130 MM HG: CPT | Performed by: FAMILY MEDICINE

## 2025-03-18 PROCEDURE — 1125F AMNT PAIN NOTED PAIN PRSNT: CPT | Performed by: FAMILY MEDICINE

## 2025-03-18 PROCEDURE — 99214 OFFICE O/P EST MOD 30 MIN: CPT | Mod: 25 | Performed by: FAMILY MEDICINE

## 2025-03-18 PROCEDURE — G0009 ADMIN PNEUMOCOCCAL VACCINE: HCPCS | Performed by: FAMILY MEDICINE

## 2025-03-18 PROCEDURE — 3078F DIAST BP <80 MM HG: CPT | Performed by: FAMILY MEDICINE

## 2025-03-18 PROCEDURE — 36415 COLL VENOUS BLD VENIPUNCTURE: CPT | Performed by: FAMILY MEDICINE

## 2025-03-18 PROCEDURE — 90480 ADMN SARSCOV2 VAC 1/ONLY CMP: CPT | Performed by: FAMILY MEDICINE

## 2025-03-18 PROCEDURE — G0439 PPPS, SUBSEQ VISIT: HCPCS | Performed by: FAMILY MEDICINE

## 2025-03-18 PROCEDURE — 69210 REMOVE IMPACTED EAR WAX UNI: CPT | Mod: 50 | Performed by: FAMILY MEDICINE

## 2025-03-18 PROCEDURE — G0008 ADMIN INFLUENZA VIRUS VAC: HCPCS | Performed by: FAMILY MEDICINE

## 2025-03-18 PROCEDURE — 90673 RIV3 VACCINE NO PRESERV IM: CPT | Performed by: FAMILY MEDICINE

## 2025-03-18 PROCEDURE — 91320 SARSCV2 VAC 30MCG TRS-SUC IM: CPT | Performed by: FAMILY MEDICINE

## 2025-03-18 RX ORDER — LEVOTHYROXINE SODIUM 75 UG/1
75 TABLET ORAL DAILY
Qty: 90 TABLET | Refills: 3 | Status: SHIPPED | OUTPATIENT
Start: 2025-03-18

## 2025-03-18 RX ORDER — TRIAMCINOLONE ACETONIDE 1 MG/G
CREAM TOPICAL
Qty: 45 G | Refills: 3 | Status: SHIPPED | OUTPATIENT
Start: 2025-03-18

## 2025-03-18 RX ORDER — ALLOPURINOL 100 MG/1
100 TABLET ORAL DAILY
Qty: 90 TABLET | Refills: 3 | Status: SHIPPED | OUTPATIENT
Start: 2025-03-18

## 2025-03-18 SDOH — HEALTH STABILITY: PHYSICAL HEALTH: ON AVERAGE, HOW MANY DAYS PER WEEK DO YOU ENGAGE IN MODERATE TO STRENUOUS EXERCISE (LIKE A BRISK WALK)?: 1 DAY

## 2025-03-18 ASSESSMENT — PAIN SCALES - GENERAL: PAINLEVEL_OUTOF10: MILD PAIN (3)

## 2025-03-18 ASSESSMENT — SOCIAL DETERMINANTS OF HEALTH (SDOH): HOW OFTEN DO YOU GET TOGETHER WITH FRIENDS OR RELATIVES?: TWICE A WEEK

## 2025-03-18 NOTE — PATIENT INSTRUCTIONS
Patient Education   Preventive Care Advice   This is general advice given by our system to help you stay healthy. However, your care team may have specific advice just for you. Please talk to your care team about your preventive care needs.  Nutrition  Eat 5 or more servings of fruits and vegetables each day.  Try wheat bread, brown rice and whole grain pasta (instead of white bread, rice, and pasta).  Get enough calcium and vitamin D. Check the label on foods and aim for 100% of the RDA (recommended daily allowance).  Lifestyle  Exercise at least 150 minutes each week  (30 minutes a day, 5 days a week).  Do muscle strengthening activities 2 days a week. These help control your weight and prevent disease.  No smoking.  Wear sunscreen to prevent skin cancer.  Have a dental exam and cleaning every 6 months.  Yearly exams  See your health care team every year to talk about:  Any changes in your health.  Any medicines your care team has prescribed.  Preventive care, family planning, and ways to prevent chronic diseases.  Shots (vaccines)   HPV shots (up to age 26), if you've never had them before.  Hepatitis B shots (up to age 59), if you've never had them before.  COVID-19 shot: Get this shot when it's due.  Flu shot: Get a flu shot every year.  Tetanus shot: Get a tetanus shot every 10 years.  Pneumococcal, hepatitis A, and RSV shots: Ask your care team if you need these based on your risk.  Shingles shot (for age 50 and up)  General health tests  Diabetes screening:  Starting at age 35, Get screened for diabetes at least every 3 years.  If you are younger than age 35, ask your care team if you should be screened for diabetes.  Cholesterol test: At age 39, start having a cholesterol test every 5 years, or more often if advised.  Bone density scan (DEXA): At age 50, ask your care team if you should have this scan for osteoporosis (brittle bones).  Hepatitis C: Get tested at least once in your life.  STIs (sexually  transmitted infections)  Before age 24: Ask your care team if you should be screened for STIs.  After age 24: Get screened for STIs if you're at risk. You are at risk for STIs (including HIV) if:  You are sexually active with more than one person.  You don't use condoms every time.  You or a partner was diagnosed with a sexually transmitted infection.  If you are at risk for HIV, ask about PrEP medicine to prevent HIV.  Get tested for HIV at least once in your life, whether you are at risk for HIV or not.  Cancer screening tests  Cervical cancer screening: If you have a cervix, begin getting regular cervical cancer screening tests starting at age 21.  Breast cancer scan (mammogram): If you've ever had breasts, begin having regular mammograms starting at age 40. This is a scan to check for breast cancer.  Colon cancer screening: It is important to start screening for colon cancer at age 45.  Have a colonoscopy test every 10 years (or more often if you're at risk) Or, ask your provider about stool tests like a FIT test every year or Cologuard test every 3 years.  To learn more about your testing options, visit:   .  For help making a decision, visit:   https://bit.ly/wq43209.  Prostate cancer screening test: If you have a prostate, ask your care team if a prostate cancer screening test (PSA) at age 55 is right for you.  Lung cancer screening: If you are a current or former smoker ages 50 to 80, ask your care team if ongoing lung cancer screenings are right for you.  For informational purposes only. Not to replace the advice of your health care provider. Copyright   2023 [facility]. All rights reserved. Clinically reviewed by the [facility]. Riskified 054925 - REV 01/24.  Learning About Activities of Daily Living  What are activities of daily living?     Activities of daily living (ADLs) are the basic self-care tasks you do every day. These include eating, bathing, dressing,  and moving around.  As you age, and if you have health problems, you may find that it's harder to do some of these tasks. If so, your doctor can suggest ideas that may help.  To measure what kind of help you may need, your doctor will ask how well you are able to do ADLs. Let your doctor know if there are any tasks that you are having trouble doing. This is an important first step to getting help. And when you have the help you need, you can stay as independent as possible.  How will a doctor assess your ADLs?  Asking about ADLs is part of a routine health checkup your doctor will likely do as you age. Your health check might be done in a doctor's office, in your home, or at a hospital. The goal is to find out if you are having any problems that could make it hard to care for yourself or that make it unsafe for you to be on your own.  To measure your ADLs, your doctor will ask how hard it is for you to do routine tasks. Your doctor may also want to know if you have changed the way you do a task because of a health problem. Your doctor may watch how you:  Walk back and forth.  Keep your balance while you stand or walk.  Move from sitting to standing or from a bed to a chair.  Button or unbutton a shirt or sweater.  Remove and put on your shoes.  It's common to feel a little worried or anxious if you find you can't do all the things you used to be able to do. Talking with your doctor about ADLs is a way to make sure you're as safe as possible and able to care for yourself as well as you can. You may want to bring a caregiver, friend, or family member to your checkup. They can help you talk to your doctor.  Follow-up care is a key part of your treatment and safety. Be sure to make and go to all appointments, and call your doctor if you are having problems. It's also a good idea to know your test results and keep a list of the medicines you take.  Current as of: October 24, 2024  Content Version: 14.4    7847-7067  [employer].   Care instructions adapted under license by your healthcare professional. If you have questions about a medical condition or this instruction, always ask your healthcare professional. [employer] disclaims any warranty or liability for your use of this information.    Preventing Falls: Care Instructions  Injuries and health problems such as trouble walking or poor eyesight can increase your risk of falling. So can some medicines. But there are things you can do to help prevent falls. You can exercise to get stronger. You can also arrange your home to make it safer.    Talk to your doctor about the medicines you take. Ask if any of them increase the risk of falls and whether they can be changed or stopped.   Try to exercise regularly. It can help improve your strength and balance. This can help lower your risk of falling.         Practice fall safety and prevention.   Wear low-heeled shoes that fit well and give your feet good support. Talk to your doctor if you have foot problems that make this hard.  Carry a cellphone or wear a medical alert device that you can use to call for help.  Use stepladders instead of chairs to reach high objects. Don't climb if you're at risk for falls. Ask for help, if needed.  Wear the correct eyeglasses, if you need them.        Make your home safer.   Remove rugs, cords, clutter, and furniture from walkways.  Keep your house well lit. Use night-lights in hallways and bathrooms.  Install and use sturdy handrails on stairways.  Wear nonskid footwear, even inside. Don't walk barefoot or in socks without shoes.        Be safe outside.   Use handrails, curb cuts, and ramps whenever possible.  Keep your hands free by using a shoulder bag or backpack.  Try to walk in well-lit areas. Watch out for uneven ground, changes in pavement, and debris.  Be careful in the winter. Walk on the grass or gravel when sidewalks are slippery. Use de-icer on steps and  "walkways. Add non-slip devices to shoes.    Put grab bars and nonskid mats in your shower or tub and near the toilet. Try to use a shower chair or bath bench when bathing.   Get into a tub or shower by putting in your weaker leg first. Get out with your strong side first. Have a phone or medical alert device in the bathroom with you.   Where can you learn more?  Go to https://www.timeplazza.net/patiented  Enter G117 in the search box to learn more about \"Preventing Falls: Care Instructions.\"  Current as of: July 31, 2024  Content Version: 14.4    6209-0265 [employer].   Care instructions adapted under license by your healthcare professional. If you have questions about a medical condition or this instruction, always ask your healthcare professional. [employer] disclaims any warranty or liability for your use of this information.    Hearing Loss: Care Instructions  Overview     Hearing loss is a sudden or slow decrease in how well you hear. It can range from slight to profound. Permanent hearing loss can occur with aging. It also can happen when you are exposed long-term to loud noise. Examples include listening to loud music, riding motorcycles, or being around other loud machines.  Hearing loss can affect your work and home life. It can make you feel lonely or depressed. You may feel that you have lost your independence. But hearing aids and other devices can help you hear better and feel connected to others.  Follow-up care is a key part of your treatment and safety. Be sure to make and go to all appointments, and call your doctor if you are having problems. It's also a good idea to know your test results and keep a list of the medicines you take.  How can you care for yourself at home?  Avoid loud noises whenever possible. This helps keep your hearing from getting worse.  Always wear hearing protection around loud noises.  Wear a hearing aid as directed.  A professional can help you pick a " "hearing aid that will work best for you.  You can also get hearing aids over the counter for mild to moderate hearing loss.  Have hearing tests as your doctor suggests. They can show whether your hearing has changed. Your hearing aid may need to be adjusted.  Use other devices as needed. These may include:  Telephone amplifiers and hearing aids that can connect to a television, stereo, radio, or microphone.  Devices that use lights or vibrations. These alert you to the doorbell, a ringing telephone, or a baby monitor.  Television closed-captioning. This shows the words at the bottom of the screen. Most new TVs can do this.  TTY (text telephone). This lets you type messages back and forth on the telephone instead of talking or listening. These devices are also called TDD. When messages are typed on the keyboard, they are sent over the phone line to a receiving TTY. The message is shown on a monitor.  Use text messaging, social media, and email if it is hard for you to communicate by telephone.  Try to learn a listening technique called speechreading. It is not lipreading. You pay attention to people's gestures, expressions, posture, and tone of voice. These clues can help you understand what a person is saying. Face the person you are talking to, and have them face you. Make sure the lighting is good. You need to see the other person's face clearly.  Think about counseling if you need help to adjust to your hearing loss.  When should you call for help?  Watch closely for changes in your health, and be sure to contact your doctor if:    You think your hearing is getting worse.     You have new symptoms, such as dizziness or nausea.   Where can you learn more?  Go to https://www.healthBoxTone.net/patiented  Enter R798 in the search box to learn more about \"Hearing Loss: Care Instructions.\"  Current as of: October 27, 2024  Content Version: 14.4    6256-0129 [facility] [employer] [facility].   Care instructions adapted under license " by your healthcare professional. If you have questions about a medical condition or this instruction, always ask your healthcare professional. [employer] disclaims any warranty or liability for your use of this information.    Learning About Sleeping Well  What does sleeping well mean?     Sleeping well means getting enough sleep to feel good and stay healthy. How much sleep is enough varies among people.  The number of hours you sleep and how you feel when you wake up are both important. If you do not feel refreshed, you probably need more sleep. Another sign of not getting enough sleep is feeling tired during the day.  Experts recommend that adults get at least 7 or more hours of sleep per day. Children and older adults need more sleep.  Why is getting enough sleep important?  Getting enough quality sleep is a basic part of good health. When your sleep suffers, your physical health, mood, and your thoughts can suffer too. You may find yourself feeling more grumpy or stressed. Not getting enough sleep also can lead to serious problems, including injury, accidents, anxiety, and depression.  What might cause poor sleeping?  Many things can cause sleep problems, including:  Changes to your sleep schedule.  Stress. Stress can be caused by fear about a single event, such as giving a speech. Or you may have ongoing stress, such as worry about work or school.  Depression, anxiety, and other mental or emotional conditions.  Changes in your sleep habits or surroundings. This includes changes that happen where you sleep, such as noise, light, or sleeping in a different bed. It also includes changes in your sleep pattern, such as having jet lag or working a late shift.  Health problems, such as pain, breathing problems, and restless legs syndrome.  Lack of regular exercise.  Using alcohol, nicotine, or caffeine before bed.  How can you help yourself?  Here are some tips that may help you sleep more soundly and  "wake up feeling more refreshed.  Your sleeping area   Use your bedroom only for sleeping and sex. A bit of light reading may help you fall asleep. But if it doesn't, do your reading elsewhere in the house. Try not to use your TV, computer, smartphone, or tablet while you are in bed.  Be sure your bed is big enough to stretch out comfortably, especially if you have a sleep partner.  Keep your bedroom quiet, dark, and cool. Use curtains, blinds, or a sleep mask to block out light. To block out noise, use earplugs, soothing music, or a \"white noise\" machine.  Your evening and bedtime routine   Create a relaxing bedtime routine. You might want to take a warm shower or bath, or listen to soothing music.  Go to bed at the same time every night. And get up at the same time every morning, even if you feel tired.  What to avoid   Limit caffeine (coffee, tea, caffeinated sodas) during the day, and don't have any for at least 6 hours before bedtime.  Avoid drinking alcohol before bedtime. Alcohol can cause you to wake up more often during the night.  Try not to smoke or use tobacco, especially in the evening. Nicotine can keep you awake.  Limit naps during the day, especially close to bedtime.  Avoid lying in bed awake for too long. If you can't fall asleep or if you wake up in the middle of the night and can't get back to sleep within about 20 minutes, get out of bed and go to another room until you feel sleepy.  Avoid taking medicine right before bed that may keep you awake or make you feel hyper or energized. Your doctor can tell you if your medicine may do this and if you can take it earlier in the day.  If you can't sleep   Imagine yourself in a peaceful, pleasant scene. Focus on the details and feelings of being in a place that is relaxing.  Get up and do a quiet or boring activity until you feel sleepy.  Avoid drinking any liquids before going to bed to help prevent waking up often to use the bathroom.  Where can you " "learn more?  Go to https://www.ZPower.net/patiented  Enter J942 in the search box to learn more about \"Learning About Sleeping Well.\"  Current as of: July 31, 2024  Content Version: 14.4    5538-3910 [employer].   Care instructions adapted under license by your healthcare professional. If you have questions about a medical condition or this instruction, always ask your healthcare professional. [employer] disclaims any warranty or liability for your use of this information.       "

## 2025-03-19 LAB
ALBUMIN SERPL BCG-MCNC: 3.7 G/DL (ref 3.5–5.2)
ALP SERPL-CCNC: 142 U/L (ref 40–150)
ALT SERPL W P-5'-P-CCNC: 12 U/L (ref 0–70)
ANION GAP SERPL CALCULATED.3IONS-SCNC: 10 MMOL/L (ref 7–15)
AST SERPL W P-5'-P-CCNC: 29 U/L (ref 0–45)
BILIRUB SERPL-MCNC: 0.4 MG/DL
BUN SERPL-MCNC: 15.2 MG/DL (ref 8–23)
CALCIUM SERPL-MCNC: 9 MG/DL (ref 8.8–10.4)
CHLORIDE SERPL-SCNC: 103 MMOL/L (ref 98–107)
CREAT SERPL-MCNC: 0.9 MG/DL (ref 0.67–1.17)
EGFRCR SERPLBLD CKD-EPI 2021: >90 ML/MIN/1.73M2
GLUCOSE SERPL-MCNC: 75 MG/DL (ref 70–99)
HCO3 SERPL-SCNC: 27 MMOL/L (ref 22–29)
POTASSIUM SERPL-SCNC: 4.1 MMOL/L (ref 3.4–5.3)
PROT SERPL-MCNC: 7.6 G/DL (ref 6.4–8.3)
SODIUM SERPL-SCNC: 140 MMOL/L (ref 135–145)
TSH SERPL DL<=0.005 MIU/L-ACNC: 2.65 UIU/ML (ref 0.3–4.2)
URATE SERPL-MCNC: 5.9 MG/DL (ref 3.4–7)

## 2025-04-07 ENCOUNTER — TELEPHONE (OUTPATIENT)
Dept: FAMILY MEDICINE | Facility: CLINIC | Age: 65
End: 2025-04-07
Payer: MEDICARE

## 2025-04-07 NOTE — TELEPHONE ENCOUNTER
Forms/Letter Request    Type of form/letter:   Physician's admission orders and standing orders.    Do we have the form/letter: Yes    Who [facility] the [facility]    Where did/will the form come from? form was mailed in    When is form/letter needed by: n/a    How would you like the form/letter returned:   Return mail in the provided postage paid envelope.    Addressed to:    Alba Shepard  [address] [place] [address]    Patient Notified form requests are processed in 5-7 business days:No    Could we send this information to you in [employer] or would you prefer to receive a phone call?:   n/a    Placed in [employer] RN bin.

## 2025-04-07 NOTE — TELEPHONE ENCOUNTER
Form prepped by HRN. Form will require further review and signature from provider.   Last physical exam with provider was 03/18/2025. Patients medication list, problem list, and immunization record printed for provider to review.   Form placed on Dr. Aubree Padilla's desk and encounter routed to provider.     - Anirudh MORRIS RN

## 2025-04-10 ENCOUNTER — MEDICAL CORRESPONDENCE (OUTPATIENT)
Dept: HEALTH INFORMATION MANAGEMENT | Facility: CLINIC | Age: 65
End: 2025-04-10
Payer: MEDICARE

## 2025-04-10 NOTE — TELEPHONE ENCOUNTER
Forms completed, and signed Copied for scanning and Mailed back to Alba Shepard   [facility] Unit Coordinator

## 2025-05-19 ENCOUNTER — TELEPHONE (OUTPATIENT)
Dept: FAMILY MEDICINE | Facility: CLINIC | Age: 65
End: 2025-05-19
Payer: MEDICARE

## 2025-05-19 NOTE — TELEPHONE ENCOUNTER
Lucio SANTIAGO, , [facility] calling with medication question  Reports patient has not been using triamcinolone cream for the past 2 weeks - their records indicate this medication has been discontinued  Reports patient still experiencing skin irritation of the legs  Requesting if patient should restart this medication?  Appears currently active with refills on file   Triage to call  back with provider update if medication should be continued at this time  Call back: [phone]   Thanks,  Juju GRIGGS, RN

## 2025-05-20 NOTE — TELEPHONE ENCOUNTER
Please let them know that Jhoan may continue using the triamcinolone cream as needed for skin irritation in the legs. -DE

## 2025-05-20 NOTE — TELEPHONE ENCOUNTER
Called and updated group home on PCP recommendation  Group home requesting updated med list be faxed  Appears current script already notes PRN use  Group home did not have fax number with them at time of call  Stated would call back to provide fax number    When group home calls back:  Please fax patient med list or at least kenalog order to group home    ThanksDarcy RN

## 2025-05-25 ENCOUNTER — HEALTH MAINTENANCE LETTER (OUTPATIENT)
Age: 65
End: 2025-05-25

## 2025-06-06 ENCOUNTER — TELEPHONE (OUTPATIENT)
Dept: FAMILY MEDICINE | Facility: CLINIC | Age: 65
End: 2025-06-06
Payer: MEDICARE

## 2025-06-13 ENCOUNTER — MEDICAL CORRESPONDENCE (OUTPATIENT)
Dept: HEALTH INFORMATION MANAGEMENT | Facility: CLINIC | Age: 65
End: 2025-06-13
Payer: MEDICARE

## 2025-06-26 ENCOUNTER — MEDICAL CORRESPONDENCE (OUTPATIENT)
Dept: HEALTH INFORMATION MANAGEMENT | Facility: CLINIC | Age: 65
End: 2025-06-26
Payer: MEDICARE

## 2025-06-26 ENCOUNTER — TELEPHONE (OUTPATIENT)
Dept: FAMILY MEDICINE | Facility: CLINIC | Age: 65
End: 2025-06-26

## 2025-08-11 ENCOUNTER — HOSPITAL ENCOUNTER (OUTPATIENT)
Dept: ULTRASOUND IMAGING | Facility: HOSPITAL | Age: 65
Discharge: HOME | End: 2025-08-11
Attending: INTERNAL MEDICINE | Admitting: INTERNAL MEDICINE
Payer: MEDICARE

## 2025-08-11 ENCOUNTER — RESULTS FOLLOW-UP (OUTPATIENT)
Dept: INTERNAL MEDICINE | Facility: CLINIC | Age: 65
End: 2025-08-11

## 2025-08-11 ENCOUNTER — OFFICE VISIT (OUTPATIENT)
Dept: INTERNAL MEDICINE | Facility: CLINIC | Age: 65
End: 2025-08-11
Payer: MEDICARE

## 2025-08-11 ENCOUNTER — TRANSFERRED RECORDS (OUTPATIENT)
Dept: HEALTH INFORMATION MANAGEMENT | Facility: CLINIC | Age: 65
End: 2025-08-11

## 2025-08-11 VITALS
RESPIRATION RATE: 15 BRPM | WEIGHT: 201.2 LBS | TEMPERATURE: 98.2 F | DIASTOLIC BLOOD PRESSURE: 44 MMHG | SYSTOLIC BLOOD PRESSURE: 82 MMHG | OXYGEN SATURATION: 94 % | BODY MASS INDEX: 31.75 KG/M2 | HEART RATE: 56 BPM

## 2025-08-11 DIAGNOSIS — M79.89 LEFT UPPER EXTREMITY SWELLING: Primary | ICD-10-CM

## 2025-08-11 DIAGNOSIS — M79.89 LEFT UPPER EXTREMITY SWELLING: ICD-10-CM

## 2025-08-11 DIAGNOSIS — Z87.39 HISTORY OF GOUT: ICD-10-CM

## 2025-08-11 DIAGNOSIS — I95.9 HYPOTENSION, UNSPECIFIED HYPOTENSION TYPE: ICD-10-CM

## 2025-08-11 LAB
ALBUMIN SERPL BCG-MCNC: 3.2 G/DL (ref 3.5–5.2)
ALP SERPL-CCNC: 97 U/L (ref 40–150)
ALT SERPL W P-5'-P-CCNC: 9 U/L (ref 0–70)
ANION GAP SERPL CALCULATED.3IONS-SCNC: 9 MMOL/L (ref 7–15)
AST SERPL W P-5'-P-CCNC: 23 U/L (ref 0–45)
BILIRUB SERPL-MCNC: 0.3 MG/DL
BUN SERPL-MCNC: 17.2 MG/DL (ref 8–23)
CALCIUM SERPL-MCNC: 8.9 MG/DL (ref 8.8–10.4)
CHLORIDE SERPL-SCNC: 105 MMOL/L (ref 98–107)
CREAT SERPL-MCNC: 0.89 MG/DL (ref 0.67–1.17)
EGFRCR SERPLBLD CKD-EPI 2021: >90 ML/MIN/1.73M2
ERYTHROCYTE [DISTWIDTH] IN BLOOD BY AUTOMATED COUNT: 12.9 % (ref 10–15)
GLUCOSE SERPL-MCNC: 88 MG/DL (ref 70–99)
HCO3 SERPL-SCNC: 26 MMOL/L (ref 22–29)
HCT VFR BLD AUTO: 43.6 % (ref 40–53)
HGB BLD-MCNC: 14.7 G/DL (ref 13.3–17.7)
MCH RBC QN AUTO: 33.1 PG (ref 26.5–33)
MCHC RBC AUTO-ENTMCNC: 33.7 G/DL (ref 31.5–36.5)
MCV RBC AUTO: 98 FL (ref 78–100)
PLATELET # BLD AUTO: 220 10E3/UL (ref 150–450)
POTASSIUM SERPL-SCNC: 4.5 MMOL/L (ref 3.4–5.3)
PROT SERPL-MCNC: 7.1 G/DL (ref 6.4–8.3)
RBC # BLD AUTO: 4.44 10E6/UL (ref 4.4–5.9)
SODIUM SERPL-SCNC: 140 MMOL/L (ref 135–145)
WBC # BLD AUTO: 4.1 10E3/UL (ref 4–11)

## 2025-08-11 PROCEDURE — 1125F AMNT PAIN NOTED PAIN PRSNT: CPT | Performed by: INTERNAL MEDICINE

## 2025-08-11 PROCEDURE — 3074F SYST BP LT 130 MM HG: CPT | Performed by: INTERNAL MEDICINE

## 2025-08-11 PROCEDURE — 85027 COMPLETE CBC AUTOMATED: CPT | Performed by: INTERNAL MEDICINE

## 2025-08-11 PROCEDURE — 99214 OFFICE O/P EST MOD 30 MIN: CPT | Performed by: INTERNAL MEDICINE

## 2025-08-11 PROCEDURE — 3078F DIAST BP <80 MM HG: CPT | Performed by: INTERNAL MEDICINE

## 2025-08-11 PROCEDURE — 93971 EXTREMITY STUDY: CPT | Mod: LT

## 2025-08-11 PROCEDURE — 36415 COLL VENOUS BLD VENIPUNCTURE: CPT | Performed by: INTERNAL MEDICINE

## 2025-08-11 PROCEDURE — 80053 COMPREHEN METABOLIC PANEL: CPT | Performed by: INTERNAL MEDICINE

## 2025-08-11 RX ORDER — CARBOXYMETHYLCELLULOSE SODIUM 10 MG/ML
GEL OPHTHALMIC
COMMUNITY
Start: 2025-04-29

## 2025-08-11 RX ORDER — TIMOLOL MALEATE 5 MG/ML
SOLUTION/ DROPS OPHTHALMIC
COMMUNITY
Start: 2025-07-25

## 2025-08-11 ASSESSMENT — PAIN SCALES - GENERAL: PAINLEVEL_OUTOF10: MILD PAIN (2)

## 2025-08-21 ENCOUNTER — PATIENT OUTREACH (OUTPATIENT)
Dept: CARE COORDINATION | Facility: CLINIC | Age: 65
End: 2025-08-21
Payer: MEDICARE